# Patient Record
Sex: MALE | Race: WHITE | NOT HISPANIC OR LATINO | Employment: OTHER | URBAN - NONMETROPOLITAN AREA
[De-identification: names, ages, dates, MRNs, and addresses within clinical notes are randomized per-mention and may not be internally consistent; named-entity substitution may affect disease eponyms.]

---

## 2017-01-03 ENCOUNTER — HOSPITAL ENCOUNTER (OUTPATIENT)
Dept: RADIATION ONCOLOGY | Facility: HOSPITAL | Age: 67
Discharge: HOME | End: 2017-01-03

## 2017-01-03 ENCOUNTER — HOSPITAL ENCOUNTER (OUTPATIENT)
Dept: RADIATION ONCOLOGY | Facility: HOSPITAL | Age: 67
Setting detail: RADIATION/ONCOLOGY SERIES
End: 2017-01-03

## 2017-01-03 PROCEDURE — 77385: CPT | Performed by: RADIOLOGY

## 2017-02-09 ENCOUNTER — DOCUMENTATION (OUTPATIENT)
Dept: RADIATION ONCOLOGY | Facility: HOSPITAL | Age: 67
End: 2017-02-09

## 2017-02-10 ENCOUNTER — OFFICE VISIT (OUTPATIENT)
Dept: RADIATION ONCOLOGY | Facility: HOSPITAL | Age: 67
End: 2017-02-10

## 2017-02-10 VITALS
BODY MASS INDEX: 30.46 KG/M2 | SYSTOLIC BLOOD PRESSURE: 142 MMHG | WEIGHT: 201 LBS | HEIGHT: 68 IN | DIASTOLIC BLOOD PRESSURE: 78 MMHG

## 2017-02-10 DIAGNOSIS — Z92.3 S/P RADIATION THERAPY 4-12 WKS AGO: ICD-10-CM

## 2017-02-10 DIAGNOSIS — C61 PROSTATE CANCER (HCC): Primary | ICD-10-CM

## 2017-03-01 ENCOUNTER — OFFICE VISIT (OUTPATIENT)
Dept: UROLOGY | Facility: CLINIC | Age: 67
End: 2017-03-01

## 2017-03-01 VITALS
BODY MASS INDEX: 30.65 KG/M2 | WEIGHT: 202.2 LBS | HEIGHT: 68 IN | TEMPERATURE: 98.5 F | DIASTOLIC BLOOD PRESSURE: 68 MMHG | SYSTOLIC BLOOD PRESSURE: 130 MMHG

## 2017-03-01 DIAGNOSIS — C61 PROSTATE CANCER (HCC): Primary | ICD-10-CM

## 2017-03-01 PROCEDURE — 99212 OFFICE O/P EST SF 10 MIN: CPT | Performed by: UROLOGY

## 2017-03-01 NOTE — PROGRESS NOTES
Mr. Palacios is 67 y.o. male    Chief Complaint   Patient presents with   • Prostate Cancer       History of Present Illness   He was initially diagnosed with prostate cancer about  several month(s) ago. This was identified in the context of elevated psa and prostate nodule.  Severity of the disease is best described as probable organ confined disease. Previous or current management includes external beam radiotherapy. Associated symptoms include no evidence of irritative or obstructive voiding symptoms, gross hematuria, lower extremity swelling or weight loss. . Currently his PSA is  1.79.     The following portions of the patient's history were reviewed and updated as appropriate: allergies, current medications, past family history, past medical history, past social history, past surgical history and problem list.    Review of Systems   Constitutional: Negative for chills and fever.   Gastrointestinal: Negative for abdominal pain, anal bleeding and blood in stool.   Genitourinary: Negative for difficulty urinating, flank pain, frequency, hematuria, penile pain and urgency.         Current Outpatient Prescriptions:   •  ALPRAZolam (XANAX) 0.5 MG tablet, Take 0.5 mg by mouth 2 (Two) Times a Day As Needed for anxiety., Disp: , Rfl:   •  triamterene-hydrochlorothiazide (DYAZIDE) 37.5-25 MG per capsule, Take 1 capsule by mouth Every Morning., Disp: , Rfl:     Past Medical History   Diagnosis Date   • History of radiation therapy 01/03/2017     7560 cGy to prostate   • Hypertension    • Prostate cancer        Past Surgical History   Procedure Laterality Date   • Prostate biopsy         Social History     Social History   • Marital status: Single     Spouse name: N/A   • Number of children: N/A   • Years of education: N/A     Social History Main Topics   • Smoking status: Never Smoker   • Smokeless tobacco: None   • Alcohol use Yes      Comment: rare    • Drug use: None   • Sexual activity: Not Asked     Other Topics  "Concern   • None     Social History Narrative       Family History   Problem Relation Age of Onset   • COPD Father    • Colon cancer Father        Objective    Visit Vitals   • /68   • Temp 98.5 °F (36.9 °C)   • Ht 68\" (172.7 cm)   • Wt 202 lb 3.2 oz (91.7 kg)   • BMI 30.74 kg/m2       Physical Exam    Abstract on 10/17/2016   Component Date Value Ref Range Status   •  PSA 04/04/2016 4.3   Final       Results for orders placed or performed in visit on 10/17/16    PSA   Result Value Ref Range     PSA 4.3      Assessment and Plan    Frederic was seen today for prostate cancer.    Diagnoses and all orders for this visit:    Prostate cancer  -     PSA; Future    Status post radiation for Steve 3+3 prostate cancer.  His PSA is 1.79.  He has no symptoms from the radiation at this point.  He is voiding well.  No problems with erections.  I would like see him back in 6 months with a repeat PSA.  I will then see him on a yearly basis and I will alternate with his radiation oncologist.    EMR Dragon/Transcription disclaimer:  Much of this encounter note is an electronic transcription/translation of spoken language to printed text. The electronic translation of spoken language may permit erroneous, or at times, nonsensical words or phrases to be inadvertently transcribed; although I have reviewed the note for such errors, some may still exist.   "

## 2017-03-06 ENCOUNTER — RESULTS ENCOUNTER (OUTPATIENT)
Dept: UROLOGY | Facility: CLINIC | Age: 67
End: 2017-03-06

## 2017-03-06 DIAGNOSIS — C61 PROSTATE CANCER (HCC): ICD-10-CM

## 2017-08-02 LAB — PSA SERPL-MCNC: 1.03 NG/ML (ref 0–4)

## 2017-08-08 ENCOUNTER — OFFICE VISIT (OUTPATIENT)
Dept: UROLOGY | Facility: CLINIC | Age: 67
End: 2017-08-08

## 2017-08-08 VITALS
SYSTOLIC BLOOD PRESSURE: 168 MMHG | WEIGHT: 185.8 LBS | HEIGHT: 68 IN | TEMPERATURE: 99.1 F | DIASTOLIC BLOOD PRESSURE: 94 MMHG | BODY MASS INDEX: 28.16 KG/M2

## 2017-08-08 DIAGNOSIS — N52.9 ERECTILE DYSFUNCTION, UNSPECIFIED ERECTILE DYSFUNCTION TYPE: ICD-10-CM

## 2017-08-08 DIAGNOSIS — C61 PROSTATE CANCER (HCC): Primary | ICD-10-CM

## 2017-08-08 PROCEDURE — 99214 OFFICE O/P EST MOD 30 MIN: CPT | Performed by: UROLOGY

## 2017-08-08 RX ORDER — TADALAFIL 20 MG/1
20 TABLET ORAL DAILY PRN
Qty: 10 TABLET | Refills: 11 | Status: SHIPPED | OUTPATIENT
Start: 2017-08-08 | End: 2017-09-07

## 2017-08-08 NOTE — PROGRESS NOTES
Mr. Palacios is 67 y.o. male    Chief Complaint   Patient presents with   • Prostate Cancer   • Erectile Dysfunction       Erectile Dysfunction   This is a new problem. The current episode started more than 1 month ago. The problem has been gradually worsening since onset. The nature of his difficulty is achieving erection and maintaining erection. Irritative symptoms do not include frequency or urgency. Pertinent negatives include no chills or hematuria. Nothing aggravates the symptoms. Past treatments include nothing. The treatment provided no relief. Risk factors include hypertension (prostate radiation).      He was initially diagnosed with prostate cancer about  1 year(s) ago. This was identified in the context of elevated psa.  Severity of the disease is best described as probable organ confined disease. Previous or current management includes external beam radiotherapy. Associated symptoms include erectile dysfunction. Currently his PSA is  1.03.     The following portions of the patient's history were reviewed and updated as appropriate: allergies, current medications, past family history, past medical history, past social history, past surgical history and problem list.    Review of Systems   Constitutional: Negative for chills and fever.   Gastrointestinal: Negative for abdominal pain, anal bleeding and blood in stool.   Genitourinary: Negative for difficulty urinating, flank pain, frequency, hematuria and urgency.         Current Outpatient Prescriptions:   •  ALPRAZolam (XANAX) 0.5 MG tablet, Take 0.5 mg by mouth 2 (Two) Times a Day As Needed for anxiety., Disp: , Rfl:   •  tadalafil (CIALIS) 20 MG tablet, Take 1 tablet by mouth Daily As Needed for erectile dysfunction for up to 30 days., Disp: 10 tablet, Rfl: 11  •  triamterene-hydrochlorothiazide (DYAZIDE) 37.5-25 MG per capsule, Take 1 capsule by mouth Every Morning., Disp: , Rfl:     Past Medical History:   Diagnosis Date   • History of radiation  "therapy 01/03/2017    7560 cGy to prostate   • Hypertension    • Prostate cancer        Past Surgical History:   Procedure Laterality Date   • PROSTATE BIOPSY         Social History     Social History   • Marital status: Single     Spouse name: N/A   • Number of children: N/A   • Years of education: N/A     Social History Main Topics   • Smoking status: Never Smoker   • Smokeless tobacco: None   • Alcohol use No   • Drug use: None   • Sexual activity: Not Asked     Other Topics Concern   • None     Social History Narrative       Family History   Problem Relation Age of Onset   • COPD Father    • Colon cancer Father        Objective    /94  Temp 99.1 °F (37.3 °C)  Ht 68\" (172.7 cm)  Wt 185 lb 12.8 oz (84.3 kg)  BMI 28.25 kg/m2    Physical Exam    Results Encounter on 03/06/2017   Component Date Value Ref Range Status   • PSA 08/02/2017 1.030  0.000 - 4.000 ng/mL Final       Results for orders placed or performed in visit on 03/06/17   PSA   Result Value Ref Range    PSA 1.030 0.000 - 4.000 ng/mL     Assessment and Plan    Frederic was seen today for prostate cancer and erectile dysfunction.    Diagnoses and all orders for this visit:    Prostate cancer  -     PSA; Future  Stay remains undetectable.  I will see him back in 6 months with a repeat PSA.    Erectile dysfunction, unspecified erectile dysfunction type  -     tadalafil (CIALIS) 20 MG tablet; Take 1 tablet by mouth Daily As Needed for erectile dysfunction for up to 30 days.  He is starting to develop some significant erectile dysfunction.  This is likely a combination of the radiation as well as history of high blood pressure.  He is interested in medications and I have written him a prescription for Cialis as well as given him a 20 mg sample packet.  He understands risk and benefits.    The patient and I discussed the likely etiology of his impotence.  We discussed diagnostic evaluation for erectile dysfunction is possible but usually does not change " "the management.  He understands that goal directed therapy is probably the best approach since no matter what the etiology, a PDE 5 inhibitor is the least invasive way to manage ED.  While there are risks to PDE 5 inhibitors which I discussed as described below, it was relayed to him that they probably provide the most \"natural\" erection.  Other options discussed included penile injections, urethral suppositories, vacuum erection device without or without the tension ring, and penile prosthesis.  The risks of headache, visual changes, hypotension, priapism, GI distress, myalgias, and the contraindication of nitrates were discussed with the patient.  The patient denies a history of nitrates either in long acting or prn use form.  He denies a history of MI, heart failure, or stroke in the last 6 months.  I discussed with him that alpha blockers should be taken at least 4 hours apart from the PDE 5 inhibitors.  He was given a sample and prescription for  Cialis  20 mg for as needed use. I reminded him that facial flushing is not usual with this medication, and that it is most effective when taken on an empty stomach without alcohol.  He understands that he needs to be forthright about using this medication in any medical situation, especially when giving nitrates is being considered.    "

## 2017-08-13 ENCOUNTER — RESULTS ENCOUNTER (OUTPATIENT)
Dept: UROLOGY | Facility: CLINIC | Age: 67
End: 2017-08-13

## 2017-08-13 DIAGNOSIS — C61 PROSTATE CANCER (HCC): ICD-10-CM

## 2018-01-29 ENCOUNTER — OFFICE VISIT (OUTPATIENT)
Dept: GASTROENTEROLOGY | Facility: CLINIC | Age: 68
End: 2018-01-29

## 2018-01-29 VITALS
BODY MASS INDEX: 27.58 KG/M2 | OXYGEN SATURATION: 99 % | WEIGHT: 182 LBS | DIASTOLIC BLOOD PRESSURE: 80 MMHG | TEMPERATURE: 97.3 F | SYSTOLIC BLOOD PRESSURE: 144 MMHG | HEIGHT: 68 IN | HEART RATE: 90 BPM

## 2018-01-29 DIAGNOSIS — Z80.0 FAMILY HISTORY OF COLON CANCER: Primary | ICD-10-CM

## 2018-01-29 DIAGNOSIS — Z86.010 HISTORY OF COLON POLYPS: ICD-10-CM

## 2018-01-29 PROCEDURE — S0260 H&P FOR SURGERY: HCPCS | Performed by: NURSE PRACTITIONER

## 2018-01-29 RX ORDER — ALBUTEROL SULFATE 2.5 MG/3ML
2.5 SOLUTION RESPIRATORY (INHALATION) EVERY 4 HOURS PRN
COMMUNITY
End: 2018-04-17

## 2018-01-29 RX ORDER — LANSOPRAZOLE 15 MG/1
15 CAPSULE, DELAYED RELEASE ORAL DAILY
COMMUNITY

## 2018-01-29 NOTE — PROGRESS NOTES
Chief Complaint   Patient presents with   • Colonoscopy     7-8-10 had colon normal     Subjective   HPI    Frederic Palacios is a 67 y.o. male who presents to office for preventative maintenance.  There is  a personal history of colon polyps.  There is not a history of colon cancer.  He does not have complaints of nausea/vomiting, change in bowels, weight loss, no BRBPR, no melena.  There is a family history of colon cancer-father dx age of 58 and maternal GM.  There is not a family history of colon polyps.  Pt last colonoscopy-2010 .  Bowels do move on regular basis.  Diagnosed with prostate cancer 3 yr ago.  Completed radiation treatment Jan 2017.  Pt worked at [employer] x 30 yr.    CScope (Dr Alonso) 2010 normal  CScope (Dr Alonso) 2004 hyperplastic polyp removed from 25 cm      Past Medical History:   Diagnosis Date   • History of radiation therapy 01/03/2017    7560 cGy to prostate   • Hypertension    • Prostate cancer      Past Surgical History:   Procedure Laterality Date   • COLONOSCOPY  07/08/2010    normal   • COLONOSCOPY  01/29/2004    small polyp at 25 cm,otherwise normal colonoscopy   • ENDOSCOPY  01/29/2004    gastritis   • PROSTATE BIOPSY       Outpatient Prescriptions Marked as Taking for the 1/29/18 encounter (Office Visit) with ADRY Strauss   Medication Sig Dispense Refill   • albuterol (PROVENTIL) (2.5 MG/3ML) 0.083% nebulizer solution Take 2.5 mg by nebulization Every 4 (Four) Hours As Needed for Wheezing.     • ALPRAZolam (XANAX) 0.5 MG tablet Take 0.5 mg by mouth 2 (Two) Times a Day As Needed for anxiety.     • lansoprazole (PREVACID) 15 MG capsule Take 15 mg by mouth Daily.     • triamterene-hydrochlorothiazide (DYAZIDE) 37.5-25 MG per capsule Take 1 capsule by mouth Every Morning.       No Known Allergies  Social History     Social History   • Marital status: Single     Spouse name: N/A   • Number of children: N/A   • Years of education: N/A     Occupational History   • Not on file.      Social History Main Topics   • Smoking status: Never Smoker   • Smokeless tobacco: Never Used   • Alcohol use No   • Drug use: No   • Sexual activity: Not on file     Other Topics Concern   • Not on file     Social History Narrative     Family History   Problem Relation Age of Onset   • COPD Father    • Colon cancer Father      Review of Systems   Constitutional: Negative for fatigue, fever and unexpected weight change.   HENT: Negative for hearing loss, sore throat and voice change.    Eyes: Negative for visual disturbance.   Respiratory: Negative for cough, shortness of breath and wheezing.    Cardiovascular: Negative for chest pain and palpitations.   Gastrointestinal: Negative for abdominal pain, blood in stool and vomiting.   Endocrine: Negative for polydipsia and polyuria.   Genitourinary: Negative for difficulty urinating, dysuria, hematuria and urgency.   Musculoskeletal: Negative for joint swelling and myalgias.   Skin: Negative for color change, rash and wound.   Neurological: Negative for dizziness, tremors, seizures and syncope.   Hematological: Does not bruise/bleed easily.   Psychiatric/Behavioral: Negative for agitation and confusion. The patient is not nervous/anxious.      Objective   Vitals:    01/29/18 0905   BP: 144/80   Pulse: 90   Temp: 97.3 °F (36.3 °C)   SpO2: 99%     Physical Exam   Constitutional: He is oriented to person, place, and time. He appears well-developed and well-nourished.   HENT:   Head: Normocephalic and atraumatic.   Eyes:   Pink, Nonicteric   Neck:   Global Assessment- supple. No JVD or lymphadenopathy   Cardiovascular: Normal rate, regular rhythm and normal heart sounds.  Exam reveals no gallop and no friction rub.    No murmur heard.  Pulmonary/Chest: Effort normal and breath sounds normal. No respiratory distress. He has no wheezes. He has no rales.   Inspection: Movements-Symmetrical   Abdominal: Soft. Bowel sounds are normal. He exhibits no distension and no mass.  There is no tenderness. There is no rebound and no guarding.   Neurological: He is alert and oriented to person, place, and time.   General Exam-Deemed a reliable historian, able to converse without difficulty and Able to move all extremities without difficulty     Imaging Results (most recent)     None        Assessment/Plan   Frederic was seen today for colonoscopy.    Diagnoses and all orders for this visit:    Family history of colon cancer  Comments:  father dx age of 58  Orders:  -     polyethylene glycol (GoLYTELY) 236 g solution; Take 3,785 mL by mouth 1 (One) Time for 1 dose. Take as directed  -     Case Request; Standing  -     Implement Anesthesia Orders Day of Procedure; Standing  -     Obtain Informed Consent; Standing    History of colon polyps  Comments:  hyperplastic 25 cm 2004      COLONOSCOPY WITH ANESTHESIA (N/A)  Declined Golytely, educated on Miralax prep    Advised pt to stop ASA day prior to procedure and to stop use of NSAIDs, Fish Oil, and MV 5 days prior to procedure.  Tylenol based products are ok to take.  Pt verbalized understanding.    All risks, benefits, alternatives, and indications of colonoscopy procedure have been discussed with the patient. Risks to include perforation of the colon requiring possible surgery or colostomy, risk of bleeding from biopsies or removal of colon tissue, possibility of missing a colon polyp or cancer, or adverse drug reaction.  Benefits to include the diagnosis and management of disease of the colon and rectum. Alternatives to include barium enema, radiographic evaluation, lab testing or no intervention. Pt verbalizes understanding and agrees.     There are no Patient Instructions on file for this visit.

## 2018-02-01 ENCOUNTER — HOSPITAL ENCOUNTER (OUTPATIENT)
Dept: RADIATION ONCOLOGY | Facility: HOSPITAL | Age: 68
Setting detail: RADIATION/ONCOLOGY SERIES
End: 2018-02-01

## 2018-02-05 LAB — PSA SERPL-MCNC: 0.99 NG/ML (ref 0–4)

## 2018-02-12 ENCOUNTER — OFFICE VISIT (OUTPATIENT)
Dept: UROLOGY | Facility: CLINIC | Age: 68
End: 2018-02-12

## 2018-02-12 VITALS
DIASTOLIC BLOOD PRESSURE: 56 MMHG | WEIGHT: 177 LBS | SYSTOLIC BLOOD PRESSURE: 140 MMHG | HEIGHT: 68 IN | TEMPERATURE: 98.1 F | BODY MASS INDEX: 26.83 KG/M2

## 2018-02-12 DIAGNOSIS — N52.9 ERECTILE DYSFUNCTION, UNSPECIFIED ERECTILE DYSFUNCTION TYPE: Primary | ICD-10-CM

## 2018-02-12 DIAGNOSIS — C61 PROSTATE CANCER (HCC): ICD-10-CM

## 2018-02-12 PROCEDURE — 99213 OFFICE O/P EST LOW 20 MIN: CPT | Performed by: UROLOGY

## 2018-02-12 RX ORDER — TADALAFIL 20 MG/1
20 TABLET ORAL DAILY PRN
Qty: 10 TABLET | Refills: 11 | Status: SHIPPED | OUTPATIENT
Start: 2018-02-12 | End: 2018-03-14

## 2018-02-12 NOTE — PROGRESS NOTES
Mr. Palacios is 68 y.o. male    Chief Complaint   Patient presents with   • Prostate Cancer   • Erectile Dysfunction       Erectile Dysfunction   This is a new problem. The current episode started more than 1 month ago. The problem has been gradually worsening since onset. The nature of his difficulty is achieving erection and maintaining erection. Irritative symptoms do not include frequency or urgency. Pertinent negatives include no chills or hematuria. Nothing aggravates the symptoms. Past treatments include tadalafil. The treatment provided significant relief. He has been using treatment for 1 to 6 months. Risk factors include hypertension (prostate radiation).      He was initially diagnosed with prostate cancer about  several month(s) ago. This was identified in the context of elevated psa.  Severity of the disease is best described as probable organ confined disease. Previous or current management includes external beam radiotherapy. Associated symptoms include erectile dysfunction. Currently his PSA is  0.99.       The following portions of the patient's history were reviewed and updated as appropriate: allergies, current medications, past family history, past medical history, past social history, past surgical history and problem list.    Review of Systems   Constitutional: Negative for chills and fever.   Gastrointestinal: Negative for abdominal pain, anal bleeding and blood in stool.   Genitourinary: Negative for difficulty urinating, flank pain, frequency, hematuria and urgency.         Current Outpatient Prescriptions:   •  albuterol (PROVENTIL) (2.5 MG/3ML) 0.083% nebulizer solution, Take 2.5 mg by nebulization Every 4 (Four) Hours As Needed for Wheezing., Disp: , Rfl:   •  ALPRAZolam (XANAX) 0.5 MG tablet, Take 0.5 mg by mouth 2 (Two) Times a Day As Needed for anxiety., Disp: , Rfl:   •  lansoprazole (PREVACID) 15 MG capsule, Take 15 mg by mouth Daily., Disp: , Rfl:   •  tadalafil (CIALIS) 20 MG  "tablet, Take 1 tablet by mouth Daily As Needed for erectile dysfunction for up to 30 days., Disp: 10 tablet, Rfl: 11  •  triamterene-hydrochlorothiazide (DYAZIDE) 37.5-25 MG per capsule, Take 1 capsule by mouth Every Morning., Disp: , Rfl:     Past Medical History:   Diagnosis Date   • History of radiation therapy 01/03/2017    7560 cGy to prostate   • Hypertension    • Prostate cancer        Past Surgical History:   Procedure Laterality Date   • COLONOSCOPY  07/08/2010    normal   • COLONOSCOPY  01/29/2004    small polyp at 25 cm,otherwise normal colonoscopy   • ENDOSCOPY  01/29/2004    gastritis   • PROSTATE BIOPSY         Social History     Social History   • Marital status: Single     Spouse name: N/A   • Number of children: N/A   • Years of education: N/A     Social History Main Topics   • Smoking status: Never Smoker   • Smokeless tobacco: Never Used   • Alcohol use No   • Drug use: No   • Sexual activity: Not Asked     Other Topics Concern   • None     Social History Narrative       Family History   Problem Relation Age of Onset   • COPD Father    • Colon cancer Father        Objective    /56  Temp 98.1 °F (36.7 °C)  Ht 172.7 cm (68\")  Wt 80.3 kg (177 lb)  BMI 26.91 kg/m2    Physical Exam    Results Encounter on 08/13/2017   Component Date Value Ref Range Status   • PSA 02/05/2018 0.995  0.000 - 4.000 ng/mL Final       Results for orders placed or performed in visit on 08/13/17   PSA   Result Value Ref Range    PSA 0.995 0.000 - 4.000 ng/mL     Assessment and Plan    Frederic was seen today for prostate cancer and erectile dysfunction.    Diagnoses and all orders for this visit:    Erectile dysfunction, unspecified erectile dysfunction type  -     tadalafil (CIALIS) 20 MG tablet; Take 1 tablet by mouth Daily As Needed for erectile dysfunction for up to 30 days.    Prostate cancer  -     PSA DIAGNOSTIC  PSA is 0.99.  I will consider this is PSA anne, continue to monitor from this point.  I will see " him back in 6 months with a repeat PSA.  With regards to his erectile dysfunction, he states that he had a good result with the Cialis.  He states that he is breaking these pills into 1/4, and this is working well.  Denies any side effects from this medication is asked for refill on this prescription.  I have done this for him today.

## 2018-02-15 ENCOUNTER — OFFICE VISIT (OUTPATIENT)
Dept: RADIATION ONCOLOGY | Facility: HOSPITAL | Age: 68
End: 2018-02-15

## 2018-02-15 ENCOUNTER — HOSPITAL ENCOUNTER (OUTPATIENT)
Dept: RADIATION ONCOLOGY | Facility: HOSPITAL | Age: 68
Setting detail: RADIATION/ONCOLOGY SERIES
End: 2018-02-15

## 2018-02-15 VITALS
WEIGHT: 177 LBS | BODY MASS INDEX: 26.83 KG/M2 | HEIGHT: 68 IN | SYSTOLIC BLOOD PRESSURE: 138 MMHG | DIASTOLIC BLOOD PRESSURE: 62 MMHG

## 2018-02-15 DIAGNOSIS — Z78.9 NON-SMOKER: ICD-10-CM

## 2018-02-15 DIAGNOSIS — Z85.46 ENCOUNTER FOR FOLLOW-UP SURVEILLANCE OF PROSTATE CANCER: ICD-10-CM

## 2018-02-15 DIAGNOSIS — Z92.3 HISTORY OF RADIATION THERAPY: ICD-10-CM

## 2018-02-15 DIAGNOSIS — C61 CANCER OF PROSTATE WITH LOW RECURRENCE RISK (STAGE T1-2A AND GLEASON < 7 AND PSA < 10) (HCC): Primary | ICD-10-CM

## 2018-02-15 DIAGNOSIS — Z08 ENCOUNTER FOR FOLLOW-UP SURVEILLANCE OF PROSTATE CANCER: ICD-10-CM

## 2018-02-15 PROCEDURE — G0463 HOSPITAL OUTPT CLINIC VISIT: HCPCS | Performed by: RADIOLOGY

## 2018-02-16 ENCOUNTER — ANESTHESIA EVENT (OUTPATIENT)
Dept: GASTROENTEROLOGY | Facility: HOSPITAL | Age: 68
End: 2018-02-16

## 2018-02-16 ENCOUNTER — TELEPHONE (OUTPATIENT)
Dept: GASTROENTEROLOGY | Facility: CLINIC | Age: 68
End: 2018-02-16

## 2018-02-16 ENCOUNTER — HOSPITAL ENCOUNTER (OUTPATIENT)
Facility: HOSPITAL | Age: 68
Setting detail: HOSPITAL OUTPATIENT SURGERY
Discharge: HOME | End: 2018-02-16
Attending: INTERNAL MEDICINE | Admitting: INTERNAL MEDICINE

## 2018-02-16 ENCOUNTER — ANESTHESIA (OUTPATIENT)
Dept: GASTROENTEROLOGY | Facility: HOSPITAL | Age: 68
End: 2018-02-16

## 2018-02-16 VITALS
BODY MASS INDEX: 26.52 KG/M2 | TEMPERATURE: 98.1 F | RESPIRATION RATE: 15 BRPM | HEIGHT: 68 IN | WEIGHT: 175 LBS | DIASTOLIC BLOOD PRESSURE: 60 MMHG | SYSTOLIC BLOOD PRESSURE: 117 MMHG | HEART RATE: 64 BPM | OXYGEN SATURATION: 98 %

## 2018-02-16 DIAGNOSIS — Z80.0 FAMILY HISTORY OF COLON CANCER: ICD-10-CM

## 2018-02-16 PROCEDURE — G0105 COLORECTAL SCRN; HI RISK IND: HCPCS | Performed by: INTERNAL MEDICINE

## 2018-02-16 PROCEDURE — 25010000002 PROPOFOL 10 MG/ML EMULSION: Performed by: NURSE ANESTHETIST, CERTIFIED REGISTERED

## 2018-02-16 RX ORDER — PROPOFOL 10 MG/ML
VIAL (ML) INTRAVENOUS AS NEEDED
Status: DISCONTINUED | OUTPATIENT
Start: 2018-02-16 | End: 2018-02-16 | Stop reason: SURG

## 2018-02-16 RX ORDER — SODIUM CHLORIDE 9 MG/ML
INJECTION, SOLUTION INTRAVENOUS CONTINUOUS PRN
Status: DISCONTINUED | OUTPATIENT
Start: 2018-02-16 | End: 2018-02-16 | Stop reason: SURG

## 2018-02-16 RX ORDER — SODIUM CHLORIDE 0.9 % (FLUSH) 0.9 %
3 SYRINGE (ML) INJECTION AS NEEDED
Status: DISCONTINUED | OUTPATIENT
Start: 2018-02-16 | End: 2018-02-16 | Stop reason: HOSPADM

## 2018-02-16 RX ORDER — SODIUM CHLORIDE 9 MG/ML
500 INJECTION, SOLUTION INTRAVENOUS CONTINUOUS PRN
Status: DISCONTINUED | OUTPATIENT
Start: 2018-02-16 | End: 2018-02-16 | Stop reason: HOSPADM

## 2018-02-16 RX ADMIN — PROPOFOL 50 MG: 10 INJECTION, EMULSION INTRAVENOUS at 07:35

## 2018-02-16 RX ADMIN — SODIUM CHLORIDE 500 ML: 9 INJECTION, SOLUTION INTRAVENOUS at 07:10

## 2018-02-16 RX ADMIN — SODIUM CHLORIDE: 9 INJECTION, SOLUTION INTRAVENOUS at 07:33

## 2018-02-16 RX ADMIN — PROPOFOL 50 MG: 10 INJECTION, EMULSION INTRAVENOUS at 07:37

## 2018-02-16 RX ADMIN — PROPOFOL 50 MG: 10 INJECTION, EMULSION INTRAVENOUS at 07:32

## 2018-02-16 RX ADMIN — LIDOCAINE HYDROCHLORIDE 0.5 ML: 10 INJECTION, SOLUTION EPIDURAL; INFILTRATION; INTRACAUDAL; PERINEURAL at 07:10

## 2018-02-16 RX ADMIN — PROPOFOL 50 MG: 10 INJECTION, EMULSION INTRAVENOUS at 07:33

## 2018-02-16 NOTE — ANESTHESIA PREPROCEDURE EVALUATION
Anesthesia Evaluation     Patient summary reviewed   no history of anesthetic complications:  NPO Solid Status: > 8 hours             Airway   Mallampati: II  TM distance: >3 FB  Neck ROM: full  Dental      Pulmonary    (+) COPD,   Cardiovascular     (+) hypertension,       Neuro/Psych- negative ROS  GI/Hepatic/Renal/Endo    (+)  GERD,     Musculoskeletal     Abdominal    Substance History      OB/GYN          Other                        Anesthesia Plan    ASA 2     general     intravenous induction   Anesthetic plan and risks discussed with patient.

## 2018-02-16 NOTE — PLAN OF CARE
Problem: GI Endoscopy (Adult)  Goal: Signs and Symptoms of Listed Potential Problems Will be Absent or Manageable (GI Endoscopy)  Outcome: Outcome(s) achieved Date Met: 02/16/18

## 2018-02-16 NOTE — H&P (VIEW-ONLY)
Chief Complaint   Patient presents with   • Colonoscopy     7-8-10 had colon normal     Subjective   HPI    Frederic Palacios is a 67 y.o. male who presents to office for preventative maintenance.  There is  a personal history of colon polyps.  There is not a history of colon cancer.  He does not have complaints of nausea/vomiting, change in bowels, weight loss, no BRBPR, no melena.  There is a family history of colon cancer-father dx age of 58 and maternal GM.  There is not a family history of colon polyps.  Pt last colonoscopy-2010 .  Bowels do move on regular basis.  Diagnosed with prostate cancer 3 yr ago.  Completed radiation treatment Jan 2017.  Pt worked at [employer] x 30 yr.    CScope (Dr Alonso) 2010 normal  CScope (Dr Alonso) 2004 hyperplastic polyp removed from 25 cm      Past Medical History:   Diagnosis Date   • History of radiation therapy 01/03/2017    7560 cGy to prostate   • Hypertension    • Prostate cancer      Past Surgical History:   Procedure Laterality Date   • COLONOSCOPY  07/08/2010    normal   • COLONOSCOPY  01/29/2004    small polyp at 25 cm,otherwise normal colonoscopy   • ENDOSCOPY  01/29/2004    gastritis   • PROSTATE BIOPSY       Outpatient Prescriptions Marked as Taking for the 1/29/18 encounter (Office Visit) with ADRY Strauss   Medication Sig Dispense Refill   • albuterol (PROVENTIL) (2.5 MG/3ML) 0.083% nebulizer solution Take 2.5 mg by nebulization Every 4 (Four) Hours As Needed for Wheezing.     • ALPRAZolam (XANAX) 0.5 MG tablet Take 0.5 mg by mouth 2 (Two) Times a Day As Needed for anxiety.     • lansoprazole (PREVACID) 15 MG capsule Take 15 mg by mouth Daily.     • triamterene-hydrochlorothiazide (DYAZIDE) 37.5-25 MG per capsule Take 1 capsule by mouth Every Morning.       No Known Allergies  Social History     Social History   • Marital status: Single     Spouse name: N/A   • Number of children: N/A   • Years of education: N/A     Occupational History   • Not on file.      Social History Main Topics   • Smoking status: Never Smoker   • Smokeless tobacco: Never Used   • Alcohol use No   • Drug use: No   • Sexual activity: Not on file     Other Topics Concern   • Not on file     Social History Narrative     Family History   Problem Relation Age of Onset   • COPD Father    • Colon cancer Father      Review of Systems   Constitutional: Negative for fatigue, fever and unexpected weight change.   HENT: Negative for hearing loss, sore throat and voice change.    Eyes: Negative for visual disturbance.   Respiratory: Negative for cough, shortness of breath and wheezing.    Cardiovascular: Negative for chest pain and palpitations.   Gastrointestinal: Negative for abdominal pain, blood in stool and vomiting.   Endocrine: Negative for polydipsia and polyuria.   Genitourinary: Negative for difficulty urinating, dysuria, hematuria and urgency.   Musculoskeletal: Negative for joint swelling and myalgias.   Skin: Negative for color change, rash and wound.   Neurological: Negative for dizziness, tremors, seizures and syncope.   Hematological: Does not bruise/bleed easily.   Psychiatric/Behavioral: Negative for agitation and confusion. The patient is not nervous/anxious.      Objective   Vitals:    01/29/18 0905   BP: 144/80   Pulse: 90   Temp: 97.3 °F (36.3 °C)   SpO2: 99%     Physical Exam   Constitutional: He is oriented to person, place, and time. He appears well-developed and well-nourished.   HENT:   Head: Normocephalic and atraumatic.   Eyes:   Pink, Nonicteric   Neck:   Global Assessment- supple. No JVD or lymphadenopathy   Cardiovascular: Normal rate, regular rhythm and normal heart sounds.  Exam reveals no gallop and no friction rub.    No murmur heard.  Pulmonary/Chest: Effort normal and breath sounds normal. No respiratory distress. He has no wheezes. He has no rales.   Inspection: Movements-Symmetrical   Abdominal: Soft. Bowel sounds are normal. He exhibits no distension and no mass.  There is no tenderness. There is no rebound and no guarding.   Neurological: He is alert and oriented to person, place, and time.   General Exam-Deemed a reliable historian, able to converse without difficulty and Able to move all extremities without difficulty     Imaging Results (most recent)     None        Assessment/Plan   Frederic was seen today for colonoscopy.    Diagnoses and all orders for this visit:    Family history of colon cancer  Comments:  father dx age of 58  Orders:  -     polyethylene glycol (GoLYTELY) 236 g solution; Take 3,785 mL by mouth 1 (One) Time for 1 dose. Take as directed  -     Case Request; Standing  -     Implement Anesthesia Orders Day of Procedure; Standing  -     Obtain Informed Consent; Standing    History of colon polyps  Comments:  hyperplastic 25 cm 2004      COLONOSCOPY WITH ANESTHESIA (N/A)  Declined Golytely, educated on Miralax prep    Advised pt to stop ASA day prior to procedure and to stop use of NSAIDs, Fish Oil, and MV 5 days prior to procedure.  Tylenol based products are ok to take.  Pt verbalized understanding.    All risks, benefits, alternatives, and indications of colonoscopy procedure have been discussed with the patient. Risks to include perforation of the colon requiring possible surgery or colostomy, risk of bleeding from biopsies or removal of colon tissue, possibility of missing a colon polyp or cancer, or adverse drug reaction.  Benefits to include the diagnosis and management of disease of the colon and rectum. Alternatives to include barium enema, radiographic evaluation, lab testing or no intervention. Pt verbalizes understanding and agrees.     There are no Patient Instructions on file for this visit.

## 2018-02-16 NOTE — PLAN OF CARE
Problem: Patient Care Overview (Adult)  Goal: Plan of Care Review  Outcome: Ongoing (interventions implemented as appropriate)   02/16/18 0736   Patient Care Overview   Progress improving   Outcome Evaluation   Outcome Summary/Follow up Plan pt tolerating procedure well        Problem: GI Endoscopy (Adult)  Goal: Signs and Symptoms of Listed Potential Problems Will be Absent or Manageable (GI Endoscopy)  Outcome: Ongoing (interventions implemented as appropriate)   02/16/18 0736   GI Endoscopy   Problems Assessed (GI Endoscopy) all   Problems Present (GI Endoscopy) none

## 2018-02-16 NOTE — PLAN OF CARE
Problem: Patient Care Overview (Adult)  Goal: Adult Individualization and Mutuality   02/16/18 0653   Individualization   Patient Specific Preferences none   Patient Specific Goals none   Patient Specific Interventions none   Mutuality/Individual Preferences   What Anxieties, Fears or Concerns Do You Have About Your Health or Care? none   What Questions Do You Have About Your Health or Care? none   What Information Would Help Us Give You More Personalized Care? none

## 2018-02-16 NOTE — ANESTHESIA POSTPROCEDURE EVALUATION
Patient: Frederic Palacios    Procedure Summary     Date Anesthesia Start Anesthesia Stop Room / Location    02/16/18 0732 0748  [facility] /  [facility]       Procedure Diagnosis Surgeon Provider    COLONOSCOPY WITH ANESTHESIA (N/A ) Family history of colon cancer  (Family history of colon cancer [Z80.0]) DO Nahum Atkins CRNA          Anesthesia Type: general  Last vitals  BP   117/60 (02/16/18 0800)   Temp   98.1 °F (36.7 °C) (02/16/18 0651)   Pulse   64 (02/16/18 0800)   Resp   15 (02/16/18 0800)     SpO2   98 % (02/16/18 0800)     Post Anesthesia Care and Evaluation    Patient location during evaluation: PHASE II  Patient participation: complete - patient participated  Level of consciousness: awake and sleepy but conscious  Pain score: 0  Pain management: adequate  Airway patency: patent  Anesthetic complications: No anesthetic complications    Cardiovascular status: acceptable  Respiratory status: acceptable  Hydration status: acceptable

## 2018-02-16 NOTE — PLAN OF CARE
Problem: Patient Care Overview (Adult)  Goal: Plan of Care Review  Outcome: Outcome(s) achieved Date Met: 02/16/18 02/16/18 0800   Patient Care Overview   Progress improving   Outcome Evaluation   Outcome Summary/Follow up Plan D/C CRITERIA MET   Coping/Psychosocial Response Interventions   Plan Of Care Reviewed With patient;significant other

## 2018-02-27 ENCOUNTER — TELEPHONE (OUTPATIENT)
Dept: UROLOGY | Facility: CLINIC | Age: 68
End: 2018-02-27

## 2018-02-27 NOTE — TELEPHONE ENCOUNTER
Patient called and said when he went to get his Cialis filled it was 79.00 a pill. The pharmacy told him about Sildenafil an wanted to know if Dr Griffith would prescribe that to him due to it was cheaper

## 2018-02-28 NOTE — TELEPHONE ENCOUNTER
Spoke with Dr. Griffith he stated that he doesn't write prescriptions for generic Cialis. Another alternative would be for the patient to get Sildenafil troches which is located at [facility]. I spoke with the pt about this he stated that he would like to try this. Dr. Griffith has written this for him and I will get this faxed over.

## 2018-04-17 ENCOUNTER — HOSPITAL ENCOUNTER (OUTPATIENT)
Dept: GENERAL RADIOLOGY | Facility: HOSPITAL | Age: 68
Discharge: HOME | End: 2018-04-17
Attending: FAMILY MEDICINE

## 2018-04-17 PROCEDURE — 71046 X-RAY EXAM CHEST 2 VIEWS: CPT

## 2018-08-06 LAB — PSA SERPL-MCNC: 0.94 NG/ML (ref 0–4)

## 2018-08-13 ENCOUNTER — OFFICE VISIT (OUTPATIENT)
Dept: UROLOGY | Facility: CLINIC | Age: 68
End: 2018-08-13

## 2018-08-13 VITALS — BODY MASS INDEX: 27.28 KG/M2 | WEIGHT: 180 LBS | HEIGHT: 68 IN | TEMPERATURE: 98.6 F

## 2018-08-13 DIAGNOSIS — N52.9 ERECTILE DYSFUNCTION, UNSPECIFIED ERECTILE DYSFUNCTION TYPE: ICD-10-CM

## 2018-08-13 DIAGNOSIS — C61 PROSTATE CANCER (HCC): Primary | ICD-10-CM

## 2018-08-13 PROCEDURE — 99213 OFFICE O/P EST LOW 20 MIN: CPT | Performed by: UROLOGY

## 2018-08-13 NOTE — PROGRESS NOTES
Mr. Palacios is 68 y.o. male    Chief Complaint   Patient presents with   • Prostate Cancer   • Erectile Dysfunction       History of Present Illness   He was initially diagnosed with prostate cancer about  2 year(s) ago. This was identified in the context of elevated psa.  Severity of the disease is best described as probable organ confined disease. Previous or current management includes external beam radiotherapy. Associated symptoms include erectile dysfunction. Currently his PSA is  0.94.     The following portions of the patient's history were reviewed and updated as appropriate: allergies, current medications, past family history, past medical history, past social history, past surgical history and problem list.    Review of Systems   Constitutional: Negative for chills and fever.   Gastrointestinal: Negative for abdominal pain, anal bleeding and blood in stool.   Genitourinary: Negative for decreased urine volume, difficulty urinating, discharge, dysuria, enuresis, flank pain, frequency, genital sores, hematuria, penile pain, penile swelling, scrotal swelling, testicular pain and urgency.         Current Outpatient Prescriptions:   •  ALPRAZolam (XANAX) 0.5 MG tablet, Take 0.5 mg by mouth 2 (Two) Times a Day As Needed for anxiety., Disp: , Rfl:   •  lansoprazole (PREVACID) 15 MG capsule, Take 15 mg by mouth Daily., Disp: , Rfl:   •  SILDENAFIL CITRATE PO, Take 94 mg by mouth., Disp: , Rfl:   •  triamterene-hydrochlorothiazide (DYAZIDE) 37.5-25 MG per capsule, Take 1 capsule by mouth Every Morning., Disp: , Rfl:     Past Medical History:   Diagnosis Date   • COPD (chronic obstructive pulmonary disease) (CMS/HCC)    • History of radiation therapy 01/03/2017    7560 cGy to prostate   • Hypertension    • Prostate cancer (CMS/HCC)        Past Surgical History:   Procedure Laterality Date   • COLONOSCOPY  07/08/2010    normal   • COLONOSCOPY  01/29/2004    small polyp at 25 cm,otherwise normal colonoscopy   •  "COLONOSCOPY N/A 2/16/2018    Procedure: COLONOSCOPY WITH ANESTHESIA;  Surgeon: Abhijeet Alonso DO;  Location: [facility] ENDOSCOPY;  Service:    • ENDOSCOPY  01/29/2004    gastritis   • PROSTATE BIOPSY         Social History     Social History   • Marital status: Single     Social History Main Topics   • Smoking status: Never Smoker   • Smokeless tobacco: Never Used   • Alcohol use No   • Drug use: No   • Sexual activity: Not Currently     Other Topics Concern   • Not on file       Family History   Problem Relation Age of Onset   • COPD Father    • Colon cancer Father    • Colon cancer Maternal Grandmother        Objective    Temp 98.6 °F (37 °C)   Ht 172.7 cm (68\")   Wt 81.6 kg (180 lb)   BMI 27.37 kg/m²     Physical Exam  Flat prostate consistent with postradiation    Office Visit on 02/12/2018   Component Date Value Ref Range Status   • PSA 08/06/2018 0.943  0.000 - 4.000 ng/mL Final       Results for orders placed or performed in visit on 02/12/18   PSA DIAGNOSTIC   Result Value Ref Range    PSA 0.943 0.000 - 4.000 ng/mL     Assessment and Plan    Frederic was seen today for prostate cancer and erectile dysfunction.    Diagnoses and all orders for this visit:    Prostate cancer (CMS/formerly [facility])  -     PSA DIAGNOSTIC; Future    Erectile dysfunction, unspecified erectile dysfunction type    PSA 0.94.  This is consistent with his anne.  No evidence for recurrence of his prostate cancer this time.  Continue to monitor with six-month PSA.  He does have known erectile dysfunction and states this is not worsening.  He was previously on the Cialis but I wrote him for the sildenafil eleni due to cost.  He has been cutting these into quarters under the direction of his pharmacist.  He states that the quarter of a 94 mg sildenafil eleni works well for him and he would like to continue on this.  He does not need a refill this time but will call in the future if he does  "

## 2019-01-22 DIAGNOSIS — C61 PROSTATE CANCER (HCC): ICD-10-CM

## 2019-01-22 LAB — PSA SERPL-MCNC: 0.46 NG/ML (ref 0–4)

## 2019-02-11 ENCOUNTER — OFFICE VISIT (OUTPATIENT)
Dept: UROLOGY | Facility: CLINIC | Age: 69
End: 2019-02-11

## 2019-02-11 VITALS — HEIGHT: 68 IN | BODY MASS INDEX: 29.07 KG/M2 | WEIGHT: 191.8 LBS | TEMPERATURE: 98.7 F

## 2019-02-11 DIAGNOSIS — C61 PROSTATE CANCER (HCC): Primary | ICD-10-CM

## 2019-02-11 DIAGNOSIS — N52.9 ERECTILE DYSFUNCTION, UNSPECIFIED ERECTILE DYSFUNCTION TYPE: ICD-10-CM

## 2019-02-11 PROCEDURE — 99213 OFFICE O/P EST LOW 20 MIN: CPT | Performed by: UROLOGY

## 2019-02-11 NOTE — PATIENT INSTRUCTIONS

## 2019-02-11 NOTE — PROGRESS NOTES
Mr. Palacios is 69 y.o. male    Chief Complaint   Patient presents with   • Prostate Cancer   • Erectile Dysfunction       History of Present Illness   He was initially diagnosed with prostate cancer about  several month(s) ago. This was identified in the context of elevated psa.  Severity of the disease is best described as probable organ confined disease. Previous or current management includes external beam radiotherapy. Associated symptoms include erectile dysfunction. Currently his PSA is  0.45.     The following portions of the patient's history were reviewed and updated as appropriate: allergies, current medications, past family history, past medical history, past social history, past surgical history and problem list.    Review of Systems   Constitutional: Negative for chills and fever.   Gastrointestinal: Negative for abdominal pain, anal bleeding and blood in stool.   Genitourinary: Negative for decreased urine volume, difficulty urinating, discharge, dysuria, enuresis, flank pain, frequency, genital sores, hematuria, penile pain, penile swelling, scrotal swelling, testicular pain and urgency.       I have reviewed the review of systems      Current Outpatient Medications:   •  ALPRAZolam (XANAX) 0.5 MG tablet, Take 0.5 mg by mouth 2 (Two) Times a Day As Needed for anxiety., Disp: , Rfl:   •  lansoprazole (PREVACID) 15 MG capsule, Take 15 mg by mouth Daily., Disp: , Rfl:   •  SILDENAFIL CITRATE PO, Take 94 mg by mouth., Disp: , Rfl:   •  triamterene-hydrochlorothiazide (DYAZIDE) 37.5-25 MG per capsule, Take 1 capsule by mouth Every Morning., Disp: , Rfl:     Past Medical History:   Diagnosis Date   • COPD (chronic obstructive pulmonary disease) (CMS/HCC)    • History of radiation therapy 01/03/2017    7560 cGy to prostate   • Hypertension    • Prostate cancer (CMS/HCC)        Past Surgical History:   Procedure Laterality Date   • COLONOSCOPY  07/08/2010    normal   • COLONOSCOPY  01/29/2004    small polyp  "at 25 cm,otherwise normal colonoscopy   • COLONOSCOPY N/A 2/16/2018    Procedure: COLONOSCOPY WITH ANESTHESIA;  Surgeon: Abhijeet Alonso DO;  Location: [facility] ENDOSCOPY;  Service:    • ENDOSCOPY  01/29/2004    gastritis   • PROSTATE BIOPSY         Social History     Socioeconomic History   • Marital status: Single     Spouse name: Not on file   • Number of children: Not on file   • Years of education: Not on file   • Highest education level: Not on file   Tobacco Use   • Smoking status: Never Smoker   • Smokeless tobacco: Never Used   Substance and Sexual Activity   • Alcohol use: No   • Drug use: No   • Sexual activity: Not Currently       Family History   Problem Relation Age of Onset   • COPD Father    • Colon cancer Father    • Colon cancer Maternal Grandmother        Objective    Temp 98.7 °F (37.1 °C)   Ht 172.7 cm (68\")   Wt 87 kg (191 lb 12.8 oz)   BMI 29.16 kg/m²     Physical Exam    Orders Only on 01/22/2019   Component Date Value Ref Range Status   • PSA 01/22/2019 0.456  0.000 - 4.000 ng/mL Final       Results for orders placed or performed in visit on 02/11/19   POC Urinalysis Dipstick, Multipro   Result Value Ref Range    Color Yellow Yellow, Straw, Dark Yellow, Aleida    Clarity, UA Clear Clear    Glucose, UA Negative Negative, 1000 mg/dL (3+) mg/dL    Bilirubin Negative Negative    Ketones, UA Negative Negative    Specific Gravity  1.010 1.005 - 1.030    Blood, UA Negative Negative    pH, Urine 6.0 5.0 - 8.0    Protein, POC Negative Negative mg/dL    Urobilinogen, UA Normal Normal    Nitrite, UA Negative Negative    Leukocytes Negative Negative     Patient's Body mass index is 29.16 kg/m². BMI is above normal parameters. Recommendations include: educational material.    Assessment and Plan    Frederic was seen today for prostate cancer and erectile dysfunction.    Diagnoses and all orders for this visit:    Prostate cancer (CMS/[facility])  -     POC Urinalysis Dipstick, Multipro  -     PSA DIAGNOSTIC; " Future    Erectile dysfunction, unspecified erectile dysfunction type  -     POC Urinalysis Dipstick, Multipro    Prostate cancer and erectile dysfunction, 2 chronic stable conditions.  PSA is 0.45.  Again this is lower than his anne.  No signs or symptoms of recurrent prostate cancer.  I will repeat his PSA in 6 months.  With regard to his erectile dysfunction, he is overall very happy with the results of the sildenafil eleni.  He is using one quarter of each dose.  I have rewritten this for him today.

## 2019-08-10 ENCOUNTER — RESULTS ENCOUNTER (OUTPATIENT)
Dept: UROLOGY | Facility: CLINIC | Age: 69
End: 2019-08-10

## 2019-08-10 DIAGNOSIS — C61 PROSTATE CANCER (HCC): ICD-10-CM

## 2019-08-13 DIAGNOSIS — C61 PROSTATE CANCER (HCC): Primary | ICD-10-CM

## 2019-09-06 LAB
ALBUMIN SERPL-MCNC: 4 G/DL (ref 3.5–5.2)
ALP BLD-CCNC: 84 U/L (ref 40–130)
ALT SERPL-CCNC: 8 U/L (ref 5–41)
ANION GAP SERPL CALCULATED.3IONS-SCNC: 15 MMOL/L (ref 7–19)
AST SERPL-CCNC: 13 U/L (ref 5–40)
BASOPHILS ABSOLUTE: 0.1 K/UL (ref 0–0.2)
BASOPHILS RELATIVE PERCENT: 0.9 % (ref 0–1)
BILIRUB SERPL-MCNC: 0.7 MG/DL (ref 0.2–1.2)
BUN BLDV-MCNC: 18 MG/DL (ref 8–23)
CALCIUM SERPL-MCNC: 8.9 MG/DL (ref 8.8–10.2)
CHLORIDE BLD-SCNC: 107 MMOL/L (ref 98–111)
CHOLESTEROL, TOTAL: 179 MG/DL (ref 160–199)
CO2: 24 MMOL/L (ref 22–29)
CREAT SERPL-MCNC: 1.3 MG/DL (ref 0.5–1.2)
EOSINOPHILS ABSOLUTE: 0.2 K/UL (ref 0–0.6)
EOSINOPHILS RELATIVE PERCENT: 2 % (ref 0–5)
GFR NON-AFRICAN AMERICAN: 55
GLUCOSE BLD-MCNC: 100 MG/DL (ref 74–109)
HCT VFR BLD CALC: 43 % (ref 42–52)
HDLC SERPL-MCNC: 53 MG/DL (ref 55–121)
HEMOGLOBIN: 13.4 G/DL (ref 14–18)
IMMATURE GRANULOCYTES #: 0.1 K/UL
LDL CHOLESTEROL CALCULATED: 110 MG/DL
LYMPHOCYTES ABSOLUTE: 1.2 K/UL (ref 1.1–4.5)
LYMPHOCYTES RELATIVE PERCENT: 16.6 % (ref 20–40)
MCH RBC QN AUTO: 27.1 PG (ref 27–31)
MCHC RBC AUTO-ENTMCNC: 31.2 G/DL (ref 33–37)
MCV RBC AUTO: 87 FL (ref 80–94)
MONOCYTES ABSOLUTE: 0.6 K/UL (ref 0–0.9)
MONOCYTES RELATIVE PERCENT: 8 % (ref 0–10)
NEUTROPHILS ABSOLUTE: 5.3 K/UL (ref 1.5–7.5)
NEUTROPHILS RELATIVE PERCENT: 71.4 % (ref 50–65)
PDW BLD-RTO: 14.3 % (ref 11.5–14.5)
PLATELET # BLD: 266 K/UL (ref 130–400)
PMV BLD AUTO: 11 FL (ref 9.4–12.4)
POTASSIUM SERPL-SCNC: 3.6 MMOL/L (ref 3.5–5)
PROSTATE SPECIFIC ANTIGEN: 0.72 NG/ML (ref 0–4)
RBC # BLD: 4.94 M/UL (ref 4.7–6.1)
SODIUM BLD-SCNC: 146 MMOL/L (ref 136–145)
TOTAL PROTEIN: 7.3 G/DL (ref 6.6–8.7)
TRIGL SERPL-MCNC: 80 MG/DL (ref 0–149)
WBC # BLD: 7.4 K/UL (ref 4.8–10.8)

## 2020-01-09 ENCOUNTER — TRANSCRIBE ORDERS (OUTPATIENT)
Dept: ADMINISTRATIVE | Facility: HOSPITAL | Age: 70
End: 2020-01-09

## 2020-01-09 ENCOUNTER — APPOINTMENT (OUTPATIENT)
Dept: LAB | Facility: HOSPITAL | Age: 70
End: 2020-01-09

## 2020-01-09 ENCOUNTER — HOSPITAL ENCOUNTER (OUTPATIENT)
Dept: GENERAL RADIOLOGY | Facility: HOSPITAL | Age: 70
Discharge: HOME | End: 2020-01-09
Admitting: PEDIATRICS

## 2020-01-09 DIAGNOSIS — R50.9 FEVER, UNSPECIFIED: Primary | ICD-10-CM

## 2020-01-09 DIAGNOSIS — R05.9 COUGH: Primary | ICD-10-CM

## 2020-01-09 LAB
ALBUMIN SERPL-MCNC: 3.7 G/DL (ref 3.5–5)
ALBUMIN/GLOB SERPL: 1.1 G/DL (ref 1.1–2.5)
ALP SERPL-CCNC: 113 U/L (ref 24–120)
ALT SERPL W P-5'-P-CCNC: 43 U/L (ref 0–54)
ANION GAP SERPL CALCULATED.3IONS-SCNC: 9 MMOL/L (ref 4–13)
AST SERPL-CCNC: 42 U/L (ref 7–45)
AUTO MIXED CELLS #: 0.5 10*3/MM3 (ref 0.1–2.6)
AUTO MIXED CELLS %: 11.6 % (ref 0.1–24)
BILIRUB SERPL-MCNC: 0.5 MG/DL (ref 0.1–1)
BUN BLD-MCNC: 20 MG/DL (ref 5–21)
BUN/CREAT SERPL: 12.9
CALCIUM SPEC-SCNC: 8.2 MG/DL (ref 8.4–10.4)
CHLORIDE SERPL-SCNC: 103 MMOL/L (ref 98–110)
CO2 SERPL-SCNC: 27 MMOL/L (ref 24–31)
CREAT BLD-MCNC: 1.55 MG/DL (ref 0.5–1.4)
ERYTHROCYTE [DISTWIDTH] IN BLOOD BY AUTOMATED COUNT: 13.8 % (ref 12.3–15.4)
GFR SERPL CREATININE-BSD FRML MDRD: 45 ML/MIN/1.73
GLOBULIN UR ELPH-MCNC: 3.5 GM/DL
GLUCOSE BLD-MCNC: 125 MG/DL (ref 70–100)
HCT VFR BLD AUTO: 41.1 % (ref 37.5–51)
HGB BLD-MCNC: 13.3 G/DL (ref 13–17.7)
LYMPHOCYTES # BLD AUTO: 0.5 10*3/MM3 (ref 0.7–3.1)
LYMPHOCYTES NFR BLD AUTO: 12.3 % (ref 19.6–45.3)
MCH RBC QN AUTO: 27 PG (ref 26.6–33)
MCHC RBC AUTO-ENTMCNC: 32.4 G/DL (ref 31.5–35.7)
MCV RBC AUTO: 83.5 FL (ref 79–97)
NEUTROPHILS # BLD AUTO: 3.3 10*3/MM3 (ref 1.7–7)
NEUTROPHILS NFR BLD AUTO: 76.1 % (ref 42.7–76)
PLATELET # BLD AUTO: 203 10*3/MM3 (ref 140–450)
PMV BLD AUTO: 10.2 FL (ref 6–12)
POTASSIUM BLD-SCNC: 3.5 MMOL/L (ref 3.5–5.3)
PROT SERPL-MCNC: 7.2 G/DL (ref 6.3–8.7)
RBC # BLD AUTO: 4.92 10*6/MM3 (ref 4.14–5.8)
SODIUM BLD-SCNC: 139 MMOL/L (ref 135–145)
WBC NRBC COR # BLD: 4.3 10*3/MM3 (ref 3.4–10.8)

## 2020-01-09 PROCEDURE — 71046 X-RAY EXAM CHEST 2 VIEWS: CPT

## 2020-01-09 PROCEDURE — 80053 COMPREHEN METABOLIC PANEL: CPT | Performed by: PEDIATRICS

## 2020-01-09 PROCEDURE — 85025 COMPLETE CBC W/AUTO DIFF WBC: CPT | Performed by: PEDIATRICS

## 2020-01-09 PROCEDURE — 36415 COLL VENOUS BLD VENIPUNCTURE: CPT | Performed by: PEDIATRICS

## 2020-03-29 ENCOUNTER — APPOINTMENT (OUTPATIENT)
Dept: GENERAL RADIOLOGY | Age: 70
End: 2020-03-29
Payer: MEDICARE

## 2020-03-29 ENCOUNTER — HOSPITAL ENCOUNTER (OUTPATIENT)
Age: 70
Setting detail: OBSERVATION
Discharge: HOME | End: 2020-03-30
Attending: EMERGENCY MEDICINE | Admitting: HOSPITALIST
Payer: MEDICARE

## 2020-03-29 PROBLEM — R07.9 CHEST PAIN: Status: ACTIVE | Noted: 2020-03-29

## 2020-03-29 LAB
ALBUMIN SERPL-MCNC: 4 G/DL (ref 3.5–5.2)
ALP BLD-CCNC: 66 U/L (ref 40–130)
ALT SERPL-CCNC: 12 U/L (ref 5–41)
ANION GAP SERPL CALCULATED.3IONS-SCNC: 13 MMOL/L (ref 7–19)
AST SERPL-CCNC: 15 U/L (ref 5–40)
BASOPHILS ABSOLUTE: 0.1 K/UL (ref 0–0.2)
BASOPHILS RELATIVE PERCENT: 0.8 % (ref 0–1)
BILIRUB SERPL-MCNC: 0.3 MG/DL (ref 0.2–1.2)
BUN BLDV-MCNC: 18 MG/DL (ref 8–23)
CALCIUM SERPL-MCNC: 9.1 MG/DL (ref 8.8–10.2)
CHLORIDE BLD-SCNC: 101 MMOL/L (ref 98–111)
CO2: 25 MMOL/L (ref 22–29)
CREAT SERPL-MCNC: 1.3 MG/DL (ref 0.5–1.2)
EOSINOPHILS ABSOLUTE: 0.2 K/UL (ref 0–0.6)
EOSINOPHILS RELATIVE PERCENT: 3.5 % (ref 0–5)
GFR NON-AFRICAN AMERICAN: 55
GLUCOSE BLD-MCNC: 122 MG/DL (ref 74–109)
HBA1C MFR BLD: 5.2 % (ref 4–6)
HCT VFR BLD CALC: 40.2 % (ref 42–52)
HEMOGLOBIN: 12.9 G/DL (ref 14–18)
IMMATURE GRANULOCYTES #: 0.1 K/UL
LYMPHOCYTES ABSOLUTE: 1.4 K/UL (ref 1.1–4.5)
LYMPHOCYTES RELATIVE PERCENT: 22.4 % (ref 20–40)
MAGNESIUM: 2 MG/DL (ref 1.6–2.4)
MAGNESIUM: 2 MG/DL (ref 1.6–2.4)
MCH RBC QN AUTO: 28.1 PG (ref 27–31)
MCHC RBC AUTO-ENTMCNC: 32.1 G/DL (ref 33–37)
MCV RBC AUTO: 87.6 FL (ref 80–94)
MONOCYTES ABSOLUTE: 0.7 K/UL (ref 0–0.9)
MONOCYTES RELATIVE PERCENT: 11 % (ref 0–10)
NEUTROPHILS ABSOLUTE: 3.9 K/UL (ref 1.5–7.5)
NEUTROPHILS RELATIVE PERCENT: 61.4 % (ref 50–65)
PDW BLD-RTO: 14.2 % (ref 11.5–14.5)
PLATELET # BLD: 282 K/UL (ref 130–400)
PMV BLD AUTO: 9.7 FL (ref 9.4–12.4)
POTASSIUM REFLEX MAGNESIUM: 3.5 MMOL/L (ref 3.5–5)
RBC # BLD: 4.59 M/UL (ref 4.7–6.1)
SODIUM BLD-SCNC: 139 MMOL/L (ref 136–145)
TOTAL PROTEIN: 7 G/DL (ref 6.6–8.7)
TROPONIN: 0.01 NG/ML (ref 0–0.03)
TROPONIN: 0.02 NG/ML (ref 0–0.03)
TSH REFLEX FT4: 3.47 UIU/ML (ref 0.35–5.5)
WBC # BLD: 6.4 K/UL (ref 4.8–10.8)

## 2020-03-29 PROCEDURE — 85025 COMPLETE CBC W/AUTO DIFF WBC: CPT

## 2020-03-29 PROCEDURE — 96374 THER/PROPH/DIAG INJ IV PUSH: CPT

## 2020-03-29 PROCEDURE — 84443 ASSAY THYROID STIM HORMONE: CPT

## 2020-03-29 PROCEDURE — 96372 THER/PROPH/DIAG INJ SC/IM: CPT

## 2020-03-29 PROCEDURE — 6370000000 HC RX 637 (ALT 250 FOR IP): Performed by: INTERNAL MEDICINE

## 2020-03-29 PROCEDURE — G0378 HOSPITAL OBSERVATION PER HR: HCPCS

## 2020-03-29 PROCEDURE — 36415 COLL VENOUS BLD VENIPUNCTURE: CPT

## 2020-03-29 PROCEDURE — 71045 X-RAY EXAM CHEST 1 VIEW: CPT

## 2020-03-29 PROCEDURE — 83036 HEMOGLOBIN GLYCOSYLATED A1C: CPT

## 2020-03-29 PROCEDURE — 84484 ASSAY OF TROPONIN QUANT: CPT

## 2020-03-29 PROCEDURE — 2580000003 HC RX 258: Performed by: INTERNAL MEDICINE

## 2020-03-29 PROCEDURE — 6370000000 HC RX 637 (ALT 250 FOR IP): Performed by: EMERGENCY MEDICINE

## 2020-03-29 PROCEDURE — 99285 EMERGENCY DEPT VISIT HI MDM: CPT

## 2020-03-29 PROCEDURE — 2500000003 HC RX 250 WO HCPCS: Performed by: INTERNAL MEDICINE

## 2020-03-29 PROCEDURE — 83735 ASSAY OF MAGNESIUM: CPT

## 2020-03-29 PROCEDURE — 93005 ELECTROCARDIOGRAM TRACING: CPT | Performed by: EMERGENCY MEDICINE

## 2020-03-29 PROCEDURE — 6360000002 HC RX W HCPCS: Performed by: INTERNAL MEDICINE

## 2020-03-29 PROCEDURE — 80053 COMPREHEN METABOLIC PANEL: CPT

## 2020-03-29 RX ORDER — HEPARIN SODIUM 5000 [USP'U]/ML
5000 INJECTION, SOLUTION INTRAVENOUS; SUBCUTANEOUS EVERY 8 HOURS SCHEDULED
Status: DISCONTINUED | OUTPATIENT
Start: 2020-03-29 | End: 2020-03-30 | Stop reason: HOSPADM

## 2020-03-29 RX ORDER — TRIAMTERENE AND HYDROCHLOROTHIAZIDE 37.5; 25 MG/1; MG/1
1 CAPSULE ORAL EVERY MORNING
COMMUNITY

## 2020-03-29 RX ORDER — ACETAMINOPHEN 650 MG/1
650 SUPPOSITORY RECTAL EVERY 6 HOURS PRN
Status: DISCONTINUED | OUTPATIENT
Start: 2020-03-29 | End: 2020-03-30 | Stop reason: HOSPADM

## 2020-03-29 RX ORDER — ASPIRIN 81 MG/1
324 TABLET, CHEWABLE ORAL ONCE
Status: COMPLETED | OUTPATIENT
Start: 2020-03-29 | End: 2020-03-29

## 2020-03-29 RX ORDER — SODIUM CHLORIDE 0.9 % (FLUSH) 0.9 %
10 SYRINGE (ML) INJECTION PRN
Status: DISCONTINUED | OUTPATIENT
Start: 2020-03-29 | End: 2020-03-30 | Stop reason: HOSPADM

## 2020-03-29 RX ORDER — ASPIRIN 81 MG/1
81 TABLET, CHEWABLE ORAL DAILY
Status: DISCONTINUED | OUTPATIENT
Start: 2020-03-30 | End: 2020-03-30 | Stop reason: HOSPADM

## 2020-03-29 RX ORDER — PANTOPRAZOLE SODIUM 20 MG/1
20 TABLET, DELAYED RELEASE ORAL
Status: DISCONTINUED | OUTPATIENT
Start: 2020-03-30 | End: 2020-03-29

## 2020-03-29 RX ORDER — TRIAMTERENE AND HYDROCHLOROTHIAZIDE 37.5; 25 MG/1; MG/1
1 CAPSULE ORAL EVERY MORNING
Status: DISCONTINUED | OUTPATIENT
Start: 2020-03-30 | End: 2020-03-30 | Stop reason: HOSPADM

## 2020-03-29 RX ORDER — ACETAMINOPHEN 325 MG/1
650 TABLET ORAL EVERY 6 HOURS PRN
Status: DISCONTINUED | OUTPATIENT
Start: 2020-03-29 | End: 2020-03-30 | Stop reason: HOSPADM

## 2020-03-29 RX ORDER — LANSOPRAZOLE 15 MG/1
15 CAPSULE, DELAYED RELEASE ORAL DAILY
COMMUNITY

## 2020-03-29 RX ORDER — NITROGLYCERIN 0.4 MG/1
0.4 TABLET SUBLINGUAL EVERY 5 MIN PRN
Status: DISCONTINUED | OUTPATIENT
Start: 2020-03-29 | End: 2020-03-30 | Stop reason: HOSPADM

## 2020-03-29 RX ORDER — SODIUM CHLORIDE 0.9 % (FLUSH) 0.9 %
10 SYRINGE (ML) INJECTION EVERY 12 HOURS SCHEDULED
Status: DISCONTINUED | OUTPATIENT
Start: 2020-03-29 | End: 2020-03-30 | Stop reason: HOSPADM

## 2020-03-29 RX ORDER — ATORVASTATIN CALCIUM 40 MG/1
40 TABLET, FILM COATED ORAL NIGHTLY
Status: DISCONTINUED | OUTPATIENT
Start: 2020-03-29 | End: 2020-03-30 | Stop reason: HOSPADM

## 2020-03-29 RX ADMIN — FAMOTIDINE 20 MG: 10 INJECTION, SOLUTION INTRAVENOUS at 23:56

## 2020-03-29 RX ADMIN — ATORVASTATIN CALCIUM 40 MG: 40 TABLET, FILM COATED ORAL at 23:56

## 2020-03-29 RX ADMIN — HEPARIN SODIUM 5000 UNITS: 5000 INJECTION INTRAVENOUS; SUBCUTANEOUS at 23:55

## 2020-03-29 RX ADMIN — SODIUM CHLORIDE, PRESERVATIVE FREE 10 ML: 5 INJECTION INTRAVENOUS at 23:56

## 2020-03-29 RX ADMIN — ASPIRIN 81 MG 324 MG: 81 TABLET ORAL at 18:32

## 2020-03-29 SDOH — HEALTH STABILITY: MENTAL HEALTH: HOW OFTEN DO YOU HAVE A DRINK CONTAINING ALCOHOL?: NEVER

## 2020-03-29 ASSESSMENT — ENCOUNTER SYMPTOMS
NAUSEA: 0
SHORTNESS OF BREATH: 0
VOMITING: 0

## 2020-03-29 ASSESSMENT — PAIN DESCRIPTION - LOCATION: LOCATION: CHEST

## 2020-03-29 ASSESSMENT — PAIN SCALES - GENERAL
PAINLEVEL_OUTOF10: 0
PAINLEVEL_OUTOF10: 4

## 2020-03-29 ASSESSMENT — PAIN DESCRIPTION - PAIN TYPE: TYPE: ACUTE PAIN

## 2020-03-29 NOTE — ED PROVIDER NOTES
reviewed. No pertinent surgical history. CURRENT MEDICATIONS       Current Discharge Medication List      CONTINUE these medications which have NOT CHANGED    Details   triamterene-hydroCHLOROthiazide (DYAZIDE) 37.5-25 MG per capsule Take 1 capsule by mouth every morning      lansoprazole (PREVACID) 15 MG delayed release capsule Take 15 mg by mouth daily             ALLERGIES     Patient has no known allergies.     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 [employer] Occupation: built [employer] as a hobby    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   Social Needs    Financial resource strain: None    Food insecurity     Worry: None     Inability: None    Transportation needs     Medical: None     Non-medical: None   Tobacco Use    Smoking status: Never Smoker    Smokeless tobacco: Never Used   Substance and Sexual Activity    Alcohol use: Yes     Comment: about 1 drink a month, never drank more    Drug use: Never    Sexual activity: None     Comment: has 2 daughters   Lifestyle    Physical activity     Days per week: None     Minutes per session: None    Stress: None   Relationships    Social connections     Talks on phone: None     Gets together: None     Attends Protestant service: None     Active member of club or organization: None     Attends meetings of clubs or organizations: None     Relationship status: None    Intimate partner violence     Fear of current or ex partner: None     Emotionally abused: None     Physically abused: None     Forced sexual activity: None   Other Topics Concern    None   Social History Narrative    CODE STATUS: Full Code    HEALTH CARE PROXY: his ex-wife, Mrs. [place]: independently    DOMICILED: has an apartment, lives alone, has no pets, no steps       SCREENINGS    Rodo Coma Scale  Eye Opening: Spontaneous  Best Verbal Response: Oriented  Best Motor Response: Obeys commands  Warriors Mark Coma Scale Score: 15 @FLOW([id number])@      PHYSICAL EXAM    (up to 7 for level 4, 8 or more for level 5)     ED Triage Vitals   BP Temp Temp src Pulse Resp SpO2 Height Weight   03/29/20 1811 03/29/20 1811 -- 03/29/20 1811 03/29/20 1811 03/29/20 1811 03/29/20 1809 03/29/20 1809   134/78 97.6 °F (36.4 °C)  78 16 97 % 5' 8\" (1.727 m) 185 lb (83.9 kg)       Physical Exam  Vitals signs and nursing note reviewed. Constitutional:       General: He is not in acute distress. Appearance: Normal appearance. He is normal weight. He is not ill-appearing, toxic-appearing or diaphoretic. HENT:      Nose: Nose normal.      Mouth/Throat:      Mouth: Mucous membranes are moist.      Pharynx: Oropharynx is clear. Eyes:      Conjunctiva/sclera: Conjunctivae normal.   Neck:      Musculoskeletal: Normal range of motion and neck supple. Cardiovascular:      Rate and Rhythm: Normal rate and regular rhythm. Heart sounds: Normal heart sounds. Pulmonary:      Effort: Pulmonary effort is normal.      Breath sounds: Normal breath sounds. Musculoskeletal:      Right lower leg: No edema. Left lower leg: No edema. Skin:     General: Skin is warm and dry. Neurological:      General: No focal deficit present. Mental Status: He is alert and oriented to person, place, and time.    Psychiatric:         Mood and Affect: Mood normal.         DIAGNOSTIC RESULTS     EKG: All EKG's are interpreted by the Emergency Department Physician who either signs or Co-signsthis chart in the absence of a cardiologist.    EKG:  Regular rate and rhythm. Normal P waves and UT interval. Normal QRS. Normal QT interval. No ST elevation or depression. This EKG was interpreted by me. RADIOLOGY:   Non-plain filmimages such as CT, Ultrasound and MRI are read by the radiologist. Plain radiographic images are visualized and preliminarily interpreted by the emergency physician with the below findings:        Interpretation per the Radiologist below, if available at the time ofthis note:    XR CHEST PORTABLE   Final Result   Opacities in the medial right lung base are favored to   represent atelectasis. Otherwise, no evidence of acute cardiopulmonary   process.    Signed by Dr Js Conn on 3/29/2020 6:38 PM      NM MYOCARDIAL SPECT REST EXERCISE OR RX    (Results Pending)         ED BEDSIDE ULTRASOUND:   Performed by ED Physician - none    LABS:  Labs Reviewed   CBC WITH AUTO DIFFERENTIAL - Abnormal; Notable for the following components:       Result Value    RBC 4.59 (*)     Hemoglobin 12.9 (*)     Hematocrit 40.2 (*)     MCHC 32.1 (*)     Monocytes % 11.0 (*)     All other components within normal limits   COMPREHENSIVE METABOLIC PANEL W/ REFLEX TO MG FOR LOW K - Abnormal; Notable for the following components:    Glucose 122 (*)     CREATININE 1.3 (*)     GFR Non- 55 (*)     All other components within normal limits   COMPREHENSIVE METABOLIC PANEL W/ REFLEX TO MG FOR LOW K - Abnormal; Notable for the following components:    CREATININE 1.5 (*)     GFR Non- 46 (*)     Total Protein 6.0 (*)     All other components within normal limits   LIPID PANEL - Abnormal; Notable for the following components:    Triglycerides 193 (*)     HDL 41 (*)     All other components within normal limits   CBC WITH AUTO DIFFERENTIAL - Abnormal; Notable for the following components:    RBC 4.51 (*) Hemoglobin 12.7 (*)     Hematocrit 40.5 (*)     MCHC 31.4 (*)     Neutrophils % 68.1 (*)     Lymphocytes % 16.0 (*)     Monocytes % 10.3 (*)     Basophils % 1.1 (*)     All other components within normal limits   TROPONIN   MAGNESIUM   HEMOGLOBIN A1C   TSH WITH REFLEX TO FT4   MAGNESIUM   TROPONIN   TROPONIN       All other labs were within normal range or not returned as of this dictation. EMERGENCY DEPARTMENT COURSE and DIFFERENTIAL DIAGNOSIS/MDM:   Vitals:    Vitals:    03/30/20 0151 03/30/20 0243 03/30/20 0337 03/30/20 0649   BP: 133/75 (!) 140/72  134/61   Pulse: 68 66  61   Resp: 18 18  16   Temp: 97.7 °F (36.5 °C) 98.1 °F (36.7 °C)  98.3 °F (36.8 °C)   TempSrc: Temporal Temporal  Temporal   SpO2: 97% 96%  97%   Weight:   188 lb 6.4 oz (85.5 kg)    Height:               MDM  Number of Diagnoses or Management Options  Chest discomfort:   Diagnosis management comments: Discussed with Dr. Violet Hauser - admit      CRITICAL CARE TIME   Total Critical Care time was 0 minutes, excluding separately reportable procedures. There was a high probability of clinically significant/lifethreatening deterioration in the patient's condition which required my urgent intervention. CONSULTS:  IP CONSULT TO CARDIOLOGY    PROCEDURES:  Unless otherwise noted below, none     Procedures    FINAL IMPRESSION      1.  Chest discomfort          DISPOSITION/PLAN   DISPOSITION        PATIENT REFERRED TO:  Nidia Kovacs MD  [facility]  [phone]            DISCHARGE MEDICATIONS:  Current Discharge Medication List             (Please note that portions of this note were completed with a voice recognition program.  Efforts were made to edit the dictations but occasionally words are mis-transcribed.)    Aldo Corrales MD (electronically signed)  Attending Emergency Physician          Aldo Corrales MD  03/30/20 6343

## 2020-03-30 ENCOUNTER — APPOINTMENT (OUTPATIENT)
Dept: NUCLEAR MEDICINE | Age: 70
End: 2020-03-30
Payer: MEDICARE

## 2020-03-30 VITALS
DIASTOLIC BLOOD PRESSURE: 72 MMHG | TEMPERATURE: 97 F | OXYGEN SATURATION: 95 % | RESPIRATION RATE: 16 BRPM | WEIGHT: 188.4 LBS | HEART RATE: 68 BPM | HEIGHT: 68 IN | SYSTOLIC BLOOD PRESSURE: 144 MMHG | BODY MASS INDEX: 28.55 KG/M2

## 2020-03-30 PROBLEM — R07.9 CHEST PAIN: Status: RESOLVED | Noted: 2020-03-29 | Resolved: 2020-03-30

## 2020-03-30 PROBLEM — R07.89 CHEST DISCOMFORT: Status: ACTIVE | Noted: 2020-03-30

## 2020-03-30 LAB
ALBUMIN SERPL-MCNC: 3.7 G/DL (ref 3.5–5.2)
ALP BLD-CCNC: 64 U/L (ref 40–130)
ALT SERPL-CCNC: 12 U/L (ref 5–41)
ANION GAP SERPL CALCULATED.3IONS-SCNC: 13 MMOL/L (ref 7–19)
AST SERPL-CCNC: 15 U/L (ref 5–40)
BASOPHILS ABSOLUTE: 0.1 K/UL (ref 0–0.2)
BASOPHILS RELATIVE PERCENT: 1.1 % (ref 0–1)
BILIRUB SERPL-MCNC: 0.3 MG/DL (ref 0.2–1.2)
BUN BLDV-MCNC: 18 MG/DL (ref 8–23)
CALCIUM SERPL-MCNC: 8.9 MG/DL (ref 8.8–10.2)
CHLORIDE BLD-SCNC: 104 MMOL/L (ref 98–111)
CHOLESTEROL, TOTAL: 174 MG/DL (ref 160–199)
CO2: 26 MMOL/L (ref 22–29)
CREAT SERPL-MCNC: 1.5 MG/DL (ref 0.5–1.2)
EKG P AXIS: 179 DEGREES
EKG P-R INTERVAL: 178 MS
EKG Q-T INTERVAL: 358 MS
EKG QRS DURATION: 82 MS
EKG QTC CALCULATION (BAZETT): 361 MS
EKG T AXIS: -130 DEGREES
EOSINOPHILS ABSOLUTE: 0.2 K/UL (ref 0–0.6)
EOSINOPHILS RELATIVE PERCENT: 3.4 % (ref 0–5)
GFR NON-AFRICAN AMERICAN: 46
GLUCOSE BLD-MCNC: 108 MG/DL (ref 74–109)
HCT VFR BLD CALC: 40.5 % (ref 42–52)
HDLC SERPL-MCNC: 41 MG/DL (ref 55–121)
HEMOGLOBIN: 12.7 G/DL (ref 14–18)
IMMATURE GRANULOCYTES #: 0.1 K/UL
LDL CHOLESTEROL CALCULATED: 94 MG/DL
LV EF: 55 %
LVEF MODALITY: NORMAL
LYMPHOCYTES ABSOLUTE: 1.1 K/UL (ref 1.1–4.5)
LYMPHOCYTES RELATIVE PERCENT: 16 % (ref 20–40)
MCH RBC QN AUTO: 28.2 PG (ref 27–31)
MCHC RBC AUTO-ENTMCNC: 31.4 G/DL (ref 33–37)
MCV RBC AUTO: 89.8 FL (ref 80–94)
MONOCYTES ABSOLUTE: 0.7 K/UL (ref 0–0.9)
MONOCYTES RELATIVE PERCENT: 10.3 % (ref 0–10)
NEUTROPHILS ABSOLUTE: 4.8 K/UL (ref 1.5–7.5)
NEUTROPHILS RELATIVE PERCENT: 68.1 % (ref 50–65)
PDW BLD-RTO: 14.3 % (ref 11.5–14.5)
PLATELET # BLD: 279 K/UL (ref 130–400)
PMV BLD AUTO: 10.6 FL (ref 9.4–12.4)
POTASSIUM REFLEX MAGNESIUM: 3.8 MMOL/L (ref 3.5–5)
RBC # BLD: 4.51 M/UL (ref 4.7–6.1)
SODIUM BLD-SCNC: 143 MMOL/L (ref 136–145)
TOTAL PROTEIN: 6 G/DL (ref 6.6–8.7)
TRIGL SERPL-MCNC: 193 MG/DL (ref 0–149)
TROPONIN: 0.01 NG/ML (ref 0–0.03)
WBC # BLD: 7.1 K/UL (ref 4.8–10.8)

## 2020-03-30 PROCEDURE — 6370000000 HC RX 637 (ALT 250 FOR IP): Performed by: HOSPITALIST

## 2020-03-30 PROCEDURE — 80053 COMPREHEN METABOLIC PANEL: CPT

## 2020-03-30 PROCEDURE — G0378 HOSPITAL OBSERVATION PER HR: HCPCS

## 2020-03-30 PROCEDURE — 2500000003 HC RX 250 WO HCPCS: Performed by: INTERNAL MEDICINE

## 2020-03-30 PROCEDURE — 3430000000 HC RX DIAGNOSTIC RADIOPHARMACEUTICAL: Performed by: INTERNAL MEDICINE

## 2020-03-30 PROCEDURE — 93005 ELECTROCARDIOGRAM TRACING: CPT | Performed by: INTERNAL MEDICINE

## 2020-03-30 PROCEDURE — 96376 TX/PRO/DX INJ SAME DRUG ADON: CPT

## 2020-03-30 PROCEDURE — A9500 TC99M SESTAMIBI: HCPCS | Performed by: INTERNAL MEDICINE

## 2020-03-30 PROCEDURE — 85025 COMPLETE CBC W/AUTO DIFF WBC: CPT

## 2020-03-30 PROCEDURE — 6370000000 HC RX 637 (ALT 250 FOR IP): Performed by: INTERNAL MEDICINE

## 2020-03-30 PROCEDURE — 84484 ASSAY OF TROPONIN QUANT: CPT

## 2020-03-30 PROCEDURE — 2580000003 HC RX 258: Performed by: INTERNAL MEDICINE

## 2020-03-30 PROCEDURE — 80061 LIPID PANEL: CPT

## 2020-03-30 PROCEDURE — 93306 TTE W/DOPPLER COMPLETE: CPT

## 2020-03-30 PROCEDURE — 78452 HT MUSCLE IMAGE SPECT MULT: CPT

## 2020-03-30 PROCEDURE — 36415 COLL VENOUS BLD VENIPUNCTURE: CPT

## 2020-03-30 PROCEDURE — 93010 ELECTROCARDIOGRAM REPORT: CPT | Performed by: INTERNAL MEDICINE

## 2020-03-30 PROCEDURE — 99205 OFFICE O/P NEW HI 60 MIN: CPT | Performed by: INTERNAL MEDICINE

## 2020-03-30 RX ORDER — SODIUM CHLORIDE 9 MG/ML
INJECTION, SOLUTION INTRAVENOUS CONTINUOUS
Status: DISCONTINUED | OUTPATIENT
Start: 2020-03-30 | End: 2020-03-30 | Stop reason: HOSPADM

## 2020-03-30 RX ORDER — ATORVASTATIN CALCIUM 40 MG/1
40 TABLET, FILM COATED ORAL NIGHTLY
Qty: 30 TABLET | Refills: 0 | Status: SHIPPED | OUTPATIENT
Start: 2020-03-30

## 2020-03-30 RX ADMIN — SODIUM CHLORIDE: 9 INJECTION, SOLUTION INTRAVENOUS at 11:45

## 2020-03-30 RX ADMIN — TRIAMTERENE AND HYDROCHLOROTHIAZIDE 1 CAPSULE: 25; 37.5 CAPSULE ORAL at 11:16

## 2020-03-30 RX ADMIN — FAMOTIDINE 20 MG: 10 INJECTION, SOLUTION INTRAVENOUS at 11:17

## 2020-03-30 RX ADMIN — TETRAKIS(2-METHOXYISOBUTYLISOCYANIDE)COPPER(I) TETRAFLUOROBORATE 30 MILLICURIE: 1 INJECTION, POWDER, LYOPHILIZED, FOR SOLUTION INTRAVENOUS at 11:16

## 2020-03-30 RX ADMIN — SODIUM CHLORIDE, PRESERVATIVE FREE 10 ML: 5 INJECTION INTRAVENOUS at 11:20

## 2020-03-30 RX ADMIN — TETRAKIS(2-METHOXYISOBUTYLISOCYANIDE)COPPER(I) TETRAFLUOROBORATE 10 MILLICURIE: 1 INJECTION, POWDER, LYOPHILIZED, FOR SOLUTION INTRAVENOUS at 11:16

## 2020-03-30 RX ADMIN — ASPIRIN 81 MG 81 MG: 81 TABLET ORAL at 11:16

## 2020-03-30 SDOH — HEALTH STABILITY: MENTAL HEALTH: HOW OFTEN DO YOU HAVE A DRINK CONTAINING ALCOHOL?: NOT ASKED

## 2020-03-30 NOTE — PROGRESS NOTES
4 Eyes Skin Assessment    Juan R Zamorano is being assessed upon: Admission    I agree that I, [facility], along with Lisa Layton (either 2 RN's or 1 LPN and 1 RN) have performed a thorough Head to Toe Skin Assessment on the patient. ALL assessment sites listed below have been assessed. Areas assessed by both nurses:     [x]   Head, Face, and Ears   [x]   Shoulders, Back, and Chest  [x]   Arms, Elbows, and Hands   [x]   Coccyx, Sacrum, and Ischium  [x]   Legs, Feet, and Heels    Does the Patient have Skin Breakdown?  No    Rolf Prevention initiated: Yes  Wound Care Orders initiated: NA    [facility] nurse consulted for Pressure Injury (Stage 3,4, Unstageable, DTI, NWPT, and Complex wounds) and New or Established Ostomies: NA        Primary Nurse eSignature: [facility], RN on 3/29/2020 at 10:26 PM      Co-Signer eSignature: Electronically signed by Sam Cadena on 3/29/20 at 11:48 PM CDT

## 2020-03-30 NOTE — H&P
an apartment, lives alone, has no pets, no steps    Family History   Problem Relation Age of Onset    Other Mother         ALS from age 76 years,  6 years later   [facility] COPD Father         was a smoker    Other Brother         murdered    No Known Problems Sister     No Known Problems Brother     No Known Problems Daughter     No Known Problems Daughter       Allergies:   No Known Allergies    Current Facility-Administered Medications   Medication Dose Route Frequency Provider Last Rate Last Dose    sodium chloride flush 0.9 % injection 10 mL  10 mL Intravenous 2 times per day Tiffany Gonzalez MD   10 mL at 20 235    sodium chloride flush 0.9 % injection 10 mL  10 mL Intravenous PRN Tiffany Gonzalez MD        acetaminophen (TYLENOL) tablet 650 mg  650 mg Oral Q6H PRN Tiffany Gonzalez MD        Or    acetaminophen (TYLENOL) suppository 650 mg  650 mg Rectal Q6H PRN Tiffany Gonzalez MD        famotidine (PEPCID) injection 20 mg  20 mg Intravenous BID Tiffany Gonzalez MD   20 mg at 20 235    atorvastatin (LIPITOR) tablet 40 mg  40 mg Oral Nightly Tiffany Gonzalez MD   40 mg at 20 235    aspirin chewable tablet 81 mg  81 mg Oral Daily Tiffany Gonzalez MD        nitroGLYCERIN (NITROSTAT) SL tablet 0.4 mg  0.4 mg Sublingual Q5 Min PRN Tiffany Gonzalez MD        heparin (porcine) injection 5,000 Units  5,000 Units Subcutaneous 3 times per day Tiffany Gonzalez MD   5,000 Units at 20 2355    triamterene-hydroCHLOROthiazide (DYAZIDE) 37.5-25 MG per capsule 1 capsule  1 capsule Oral SABRINA Churchill MD         Current Outpatient Medications   Medication Sig Dispense Refill    triamterene-hydroCHLOROthiazide (DYAZIDE) 37.5-25 MG per capsule Take 1 capsule by mouth every morning      lansoprazole (PREVACID) 15 MG delayed release capsule Take 15 mg by mouth daily           OBJECTIVE:    Vitals:    20 1811   BP: 134/78   Pulse: 78   Resp: 16   Temp: Psychiatric:         Mood and Affect: Mood normal.         Behavior: Behavior normal.         LABORATORY DATA:    CBC:  Recent Labs     03/29/20 1818   WBC 6.4   HGB 12.9*   HCT 40.2*          CMP:  Recent Labs     03/29/20 1818      K 3.5      CO2 25   BUN 18   CREATININE 1.3*   CALCIUM 9.1     Recent Labs     03/29/20 1818   AST 15   ALT 12   BILITOT 0.3   ALKPHOS 66       Cardiac Enzymes:   Recent Labs     03/29/20  1818 03/29/20  2226 03/30/20  0251   TROPONINI 0.01 0.02 0.01     TSH :  Results for Adelaida Maynard (MRN [id number]) as of 3/30/2020 04:23   Ref. Range 3/29/2020 18:18   TSH Reflex FT4 Latest Ref Range: 0.35 - 5.50 uIU/mL 3.47     A1C:   Results for Aedlaida Maynard (MRN [id number]) as of 3/30/2020 04:23   Ref. Range 3/29/2020 18:18   Glucose Latest Ref Range: 74 - 109 mg/dL 122 (H)   Hemoglobin A1C Latest Ref Range: 4.0 - 6.0 % 5.2       EKG:   No ST changes as per endorsement from day team hospitalist who received consult from ED      IMAGING:  Xr Chest Portable  Result Date: 3/29/2020  Opacities in the medial right lung base are favored to represent atelectasis. Otherwise, no evidence of acute cardiopulmonary process.  Signed by Dr Naima Meier on 3/29/2020 6:38 PM          ASESSMENTS & PLANS:    Patient Active Problem List   Diagnosis    Chest pain   \"admit\" to OBServation in Cardiac Unit under Hospitlaist team  Cardio consult in AM as per request/suggestion - is medically reasonable  Tele  Troponin Trending  High Intensity Statin  Got loading dose of ASA in ED, will need 81 daily from tomorrow for now  NG SL PRN CP  TTE in AM  AC not indicated as such as per lack of positive TnI or EKG changes - will use DVT PPx dosing however as per below    Continue Home Anti-HTN of:   [START ON 3/30/2020] triamterene-hydroCHLOROthiazide  1 capsule Oral QAM     Does NTO have PATRIA:  Cr 1.3 is at baseline    GERD:  Tx as per GI (PUD) PPx: (listed below)    Supoportive and Prophylactic

## 2020-03-30 NOTE — CONSULTS
rhythm,   Pulse Readings from Last 1 Encounters:   03/30/20 68    bpm,  without Acute changes    Troponin:  negative myocardial necrosis (  0.01); the creatinine is abnormal 1.5    CBC:   Recent Labs     03/29/20 1818 03/30/20  0251   WBC 6.4 7.1   HGB 12.9* 12.7*   HCT 40.2* 40.5*   MCV 87.6 89.8    279     BMP:   Recent Labs     03/29/20 1818 03/30/20  0251    143   K 3.5 3.8    104   CO2 25 26   BUN 18 18   CREATININE 1.3* 1.5*     Cardiac Enzymes:   Recent Labs     03/29/20 1818 03/29/20  2226 03/30/20  0251   TROPONINI 0.01 0.02 0.01     PT/INR: No results for input(s): PROTIME, INR in the last 72 hours. APTT: No results for input(s): APTT in the last 72 hours. Liver Profile:  Lab Results   Component Value Date    AST 15 03/30/2020    ALT 12 03/30/2020    BILITOT 0.3 03/30/2020    ALKPHOS 64 03/30/2020     Lab Results   Component Value Date    CHOL 174 03/30/2020    HDL 41 03/30/2020    TRIG 193 03/30/2020     TSH:  No results found for: TSH  UA: No results found for: NITRITE, COLORU, PHUR, LABCAST, WBCUA, RBCUA, MUCUS, TRICHOMONAS, YEAST, BACTERIA, CLARITYU, SPECGRAV, LEUKOCYTESUR, UROBILINOGEN, BILIRUBINUR, BLOODU, GLUCOSEU, AMORPHOUS          ALL THE CARDIOLOGY PROBLEMS ARE LISTED ABOVE; HOWEVER, THE FOLLOWING SPECIFIC CARDIAC PROBLEMS WERE ADDRESSED AND  TREATED DURING [facility]               Cardiac Specific Problem / Diagnosis  Discussion and Data Reviewed Diagnostic or Therapeutic Procedures Ordered Management Options Selected                   1. Presenting problem / symptom    Chest discomfort of ? etiology  are worsening   The chest discomfort is was not exertional and does not appear to represent myocardial ischemia.       Nonetheless, He has the following risk factors for the presence of coronary artery disease:    Risk Factor Yes / No / Unknown       Gender Male   Cigarette Use No   Family History of Cardiovascular Disease N/A   Diabetes

## 2020-03-30 NOTE — DISCHARGE SUMMARY
Kathy Waddell  :  1950  MRN:  [id number]    Admit date:  3/29/2020  Discharge date:  3/30/2020       Admitting Physician:  Monica Sweeney MD    Advance Directive: Full Code    Consults Made:   IP CONSULT TO CARDIOLOGY      Primary Care Physician:  Yuliet Kwong MD    Discharge Diagnoses: Active Problems:    * No active hospital problems. *  Resolved Problems:    Chest pain            Pertinent Labs:  CBC with DIFF:  Recent Labs     20  0251   WBC 6.4 7.1   RBC 4.59* 4.51*   HGB 12.9* 12.7*   HCT 40.2* 40.5*   MCV 87.6 89.8   MCH 28.1 28.2   MCHC 32.1* 31.4*   RDW 14.2 14.3    279   MPV 9.7 10.6   NEUTOPHILPCT 61.4 68.1*   LYMPHOPCT 22.4 16.0*   MONOPCT 11.0* 10.3*   EOSRELPCT 3.5 3.4   BASOPCT 0.8 1.1*   NEUTROABS 3.9 4.8   LYMPHSABS 1.4 1.1   MONOSABS 0.70 0.70   EOSABS 0.20 0.20   BASOSABS 0.10 0.10       CMP/BMP:  Recent Labs     20  0251    143   K 3.5 3.8    104   CO2 25 26   ANIONGAP 13 13   GLUCOSE 122* 108   BUN 18 18   CREATININE 1.3* 1.5*   LABGLOM 55* 46*   CALCIUM 9.1 8.9   PROT 7.0 6.0*   LABALBU 4.0 3.7   BILITOT 0.3 0.3   ALKPHOS 66 64   ALT 12 12   AST 15 15         CRP:  No results for input(s): CRP in the last 72 hours. Sed Rate:  No results for input(s): SEDRATE in the last 72 hours. HgBA1c:  No components found for: HGBA1C  FLP:    Lab Results   Component Value Date    TRIG 193 2020    HDL 41 2020    LDLCALC 94 2020     TSH:  No results found for: TSH  Troponin T:   Recent Labs     20  2226 20  0251   TROPONINI 0.01 0.02 0.01     Pro-BNP: No results for input(s): BNP in the last 72 hours. INR: No results for input(s): INR in the last 72 hours.   ABGs: No results found for: PHART, PO2ART, APH5KPO  UA:No results for input(s): NITRITE, COLORU, PHUR, LABCAST, [address] [facility], [address] [facility], MUCUS, TRICHOMONAS, YEAST, BACTERIA, CLARITYU, SPECGRAV, LEUKOCYTESUR, [address], BILIRUBINUR, BLOODU, Shay Monahananahy in the last 72 hours. Invalid input(s): Tevin Doe      Culture Results:    No results for input(s): CXSURG in the last 720 hours. Blood Culture Recent: No results for input(s): BC in the last 720 hours. Cultures:   No results for input(s): CULTURE in the last 72 hours. No results for input(s): BC, Dionicio Livingston in the last 72 hours. No results for input(s): CXSURG in the last 72 hours. Recent Labs     03/29/20  1818   MG 2.0  2.0     Recent Labs     03/29/20  1818 03/30/20  0251   AST 15 15   ALT 12 12   BILITOT 0.3 0.3   ALKPHOS 66 64           Significant Diagnostic Studies:   Xr Chest Portable    Result Date: 3/29/2020  EXAMINATION: XR CHEST PORTABLE 3/29/2020 6:36 PM HISTORY: Chest pain COMPARISON: 5/25/2010 FINDINGS: Heart and mediastinal contours appear normal. Minimal airspace opacities are noted in the medial right lung base. The lungs otherwise appear clear. There is no appreciable pneumothorax or pleural effusion. Pulmonary vasculature appears grossly normal.    Opacities in the medial right lung base are favored to represent atelectasis. Otherwise, no evidence of acute cardiopulmonary process. Signed by Dr Herminio Kirkland on 3/29/2020 6:38 PM    Nm Myocardial Spect Rest Exercise Or Rx    Result Date: 3/30/2020  [facility] Nuclear Stress Test Report Procedure date: March 30, 2020 Indications: chest pain Procedure: Stress was performed with injection of 0.4 mg Lexiscan. Vital signs and EKG were monitored. Technetium-99 sestamibi was injected in divided doses, approximately 10 mCi and 30 mCi respectively for rest and stress imaging. The patient was discharged in stable condition. Results: Patient had symptoms of dyspnea during infusion that resolved in recovery. Baseline EKG showed normal sinus rhythm without ST/T changes. During stress there were no significant EKG changes or rhythm changes. Baseline and peak blood pressures were 142/69, and 142/69 respectively.   Baseline and peak heart rates were 65 and  95 respectively. Lexiscan/Cardiolyte Nuclear Medicine Report Date of Procedure: 3/30/2020 The patient was injected with 35.0 millicuries (mCi) of Technetium (Tc99m). After an appropriate level of stress the patient was re-injected with 47.5 millicuries (mCi) of Technetium (Tc99m). Repeat gated images were then performed per standard protocol. Findings: 1. Analysis of the the stress and rest images reveals no obvious defects on stress or rest images. 2.  Analysis of the gated images reveals grossly normal left ventricular function with a calculated ejection fraction of 58 %. Impression: There is no obvious infarct or ischemia, with a calculated ejection fraction of 58 %. Suggest: Clinical correlation and consideration for medical management. Signed by Dr Edmond Kerr on 3/30/2020 2:00 PM    2D Echo - 03/30/2020  Summary   LV is normal in size with normal systolic function. LV ejection fraction   estimated at 55%. Normal diastolic function. RV is normal in size with normal systolic function. Normal left atrial size. Normal right atrial size. Aortic valve leaflets are not well-visualized (no short axis views) mild   aortic stenosis (mean gradient 14 mmHg) mild aortic regurgitation. Mitral valve is mildly thickened with normal leaflet mobility. No   stenosis. Mild to moderate mitral regurgitation. Trace tricuspid regurgitation. Signature   ----------------------------------------------------------------   Electronically signed by Kristine Menendez MD(Interpreting physician)   on 03/30/2020 11:21 AM   ----------------------------------------------------------------        Hospital Course:   Mr. Flaca Johnson, a 70-year-old male, history of HTN, presenting to [facility] ED (03/29/2020), on account of a 3-day history of intermittent, moderate midsternal burning chest discomfort, radiating to his neck. , without any reported associated symptoms.     Chest pain   · - Admitted to PCU to r/o ACS round, and reactive to light. Neck:      Musculoskeletal: Normal range of motion and neck supple. No neck rigidity or muscular tenderness. Vascular: No carotid bruit. Cardiovascular:      Rate and Rhythm: Normal rate and regular rhythm. Pulses: Normal pulses. Heart sounds: Normal heart sounds. No murmur. No friction rub. No gallop. Pulmonary:      Effort: Pulmonary effort is normal. No respiratory distress. Breath sounds: Normal breath sounds. No stridor. No wheezing, rhonchi or rales. Chest:      Chest wall: No tenderness. Abdominal:      General: Bowel sounds are normal. There is no distension. Palpations: Abdomen is soft. Tenderness: There is no abdominal tenderness. Musculoskeletal: Normal range of motion. General: No swelling, tenderness, deformity or signs of injury. Right lower leg: No edema. Left lower leg: No edema. Skin:     General: Skin is warm and dry. Capillary Refill: Capillary refill takes less than 2 seconds. Coloration: Skin is not jaundiced or pale. Findings: No bruising, erythema, lesion or rash. Neurological:      General: No focal deficit present. Mental Status: He is alert and oriented to person, place, and time. Cranial Nerves: No cranial nerve deficit. Sensory: No sensory deficit. Motor: No weakness. Coordination: Coordination normal.   Psychiatric:         Mood and Affect: Mood normal.         Behavior: Behavior normal.         Thought Content:  Thought content normal.         Judgment: Judgment normal.       Discharge Medications:       Aleksey Dempsey   Home Medication Instructions NOS:[id number]    Printed on:03/30/20 1043   Medication Information                      atorvastatin (LIPITOR) 40 MG tablet  Take 1 tablet by mouth nightly             lansoprazole (PREVACID) 15 MG delayed release capsule  Take 15 mg by mouth daily             triamterene-hydroCHLOROthiazide (DYAZIDE) 37.5-25 MG per capsule  Take 1 capsule by mouth every morning                 Discharge Instructions: Follow up with Moo Thompson MD in 7 days. Take medications as directed. Resume activity as tolerated. Diet: DIET CARDIAC;        DISCHARGE STATUS:    Condition: Good  Disposition: Patient is medically stable and will be discharged Home    Extended Emergency Contact Information  Primary Emergency Contact: Judd Justice, [address] Phone: [phone]  Relation: Other       Time Spent on discharge is more than 32 mins in the examination, evaluation, counseling and review of medications and discharge plan. Electronically signed by   Libia Love MD, MPH,   Internal Medicine Hospitalist   3/30/2020 5:06 PM      Thank you Moo Thompson MD for the opportunity to be involved in this patient's care. If you have any questions or concerns please feel free to contact me at [phone]        EMR Dragon/Transcription disclaimer:   Much of this encounter note is an electronic transcription/translation of spoken language to printed text.  The electronic translation of spoken language may permit erroneous, or at times, nonsensical words or phrases to be inadvertently transcribed; although attempts have made to review the note for such errors, some may still exist.

## 2020-03-31 LAB
EKG P AXIS: 52 DEGREES
EKG P-R INTERVAL: 178 MS
EKG Q-T INTERVAL: 362 MS
EKG QRS DURATION: 82 MS
EKG QTC CALCULATION (BAZETT): 386 MS
EKG T AXIS: 21 DEGREES
LV EF: 58 %
LVEF MODALITY: NORMAL

## 2020-03-31 PROCEDURE — 93010 ELECTROCARDIOGRAM REPORT: CPT | Performed by: INTERNAL MEDICINE

## 2020-04-15 LAB
ALBUMIN SERPL-MCNC: 3.8 G/DL (ref 3.5–5.2)
ALP BLD-CCNC: 93 U/L (ref 40–130)
ALT SERPL-CCNC: 16 U/L (ref 5–41)
ANION GAP SERPL CALCULATED.3IONS-SCNC: 15 MMOL/L (ref 7–19)
AST SERPL-CCNC: 14 U/L (ref 5–40)
BILIRUB SERPL-MCNC: 0.4 MG/DL (ref 0.2–1.2)
BUN BLDV-MCNC: 14 MG/DL (ref 8–23)
CALCIUM SERPL-MCNC: 9.5 MG/DL (ref 8.8–10.2)
CHLORIDE BLD-SCNC: 103 MMOL/L (ref 98–111)
CHOLESTEROL, TOTAL: 121 MG/DL (ref 160–199)
CO2: 26 MMOL/L (ref 22–29)
CREAT SERPL-MCNC: 1.4 MG/DL (ref 0.5–1.2)
GFR NON-AFRICAN AMERICAN: 50
GLUCOSE BLD-MCNC: 101 MG/DL (ref 74–109)
HDLC SERPL-MCNC: 34 MG/DL (ref 55–121)
LDL CHOLESTEROL CALCULATED: 66 MG/DL
POTASSIUM SERPL-SCNC: 4.5 MMOL/L (ref 3.5–5)
PROSTATE SPECIFIC ANTIGEN: 0.84 NG/ML (ref 0–4)
SODIUM BLD-SCNC: 144 MMOL/L (ref 136–145)
TOTAL PROTEIN: 7.8 G/DL (ref 6.6–8.7)
TRIGL SERPL-MCNC: 107 MG/DL (ref 0–149)

## 2021-12-22 ENCOUNTER — TRANSCRIBE ORDERS (OUTPATIENT)
Dept: ADMINISTRATIVE | Facility: HOSPITAL | Age: 71
End: 2021-12-22

## 2021-12-22 ENCOUNTER — HOSPITAL ENCOUNTER (OUTPATIENT)
Dept: GENERAL RADIOLOGY | Age: 71
Discharge: HOME | End: 2021-12-22
Payer: MEDICARE

## 2021-12-22 DIAGNOSIS — M79.671 RIGHT FOOT PAIN: ICD-10-CM

## 2021-12-22 DIAGNOSIS — R06.02 SHORTNESS OF BREATH: Primary | ICD-10-CM

## 2021-12-22 PROCEDURE — 73630 X-RAY EXAM OF FOOT: CPT

## 2021-12-30 ENCOUNTER — APPOINTMENT (OUTPATIENT)
Dept: PULMONOLOGY | Facility: HOSPITAL | Age: 71
End: 2021-12-30

## 2022-01-04 ENCOUNTER — APPOINTMENT (OUTPATIENT)
Dept: PULMONOLOGY | Facility: HOSPITAL | Age: 72
End: 2022-01-04

## 2022-01-18 ENCOUNTER — TRANSCRIBE ORDERS (OUTPATIENT)
Dept: ADMINISTRATIVE | Facility: HOSPITAL | Age: 72
End: 2022-01-18

## 2022-01-19 ENCOUNTER — TRANSCRIBE ORDERS (OUTPATIENT)
Dept: LAB | Facility: HOSPITAL | Age: 72
End: 2022-01-19

## 2022-01-19 DIAGNOSIS — Z01.818 PREOP TESTING: Primary | ICD-10-CM

## 2022-01-24 ENCOUNTER — LAB (OUTPATIENT)
Dept: LAB | Facility: HOSPITAL | Age: 72
End: 2022-01-24

## 2022-01-24 LAB — SARS-COV-2 ORF1AB RESP QL NAA+PROBE: NOT DETECTED

## 2022-01-24 PROCEDURE — U0004 COV-19 TEST NON-CDC HGH THRU: HCPCS | Performed by: FAMILY MEDICINE

## 2022-01-24 PROCEDURE — C9803 HOPD COVID-19 SPEC COLLECT: HCPCS | Performed by: FAMILY MEDICINE

## 2022-01-24 PROCEDURE — U0005 INFEC AGEN DETEC AMPLI PROBE: HCPCS | Performed by: FAMILY MEDICINE

## 2022-01-26 ENCOUNTER — HOSPITAL ENCOUNTER (OUTPATIENT)
Dept: PULMONOLOGY | Facility: HOSPITAL | Age: 72
Discharge: HOME | End: 2022-01-26
Admitting: FAMILY MEDICINE

## 2022-01-26 DIAGNOSIS — R06.02 SHORTNESS OF BREATH: ICD-10-CM

## 2022-01-26 PROCEDURE — 94727 GAS DIL/WSHOT DETER LNG VOL: CPT | Performed by: INTERNAL MEDICINE

## 2022-01-26 PROCEDURE — 94729 DIFFUSING CAPACITY: CPT

## 2022-01-26 PROCEDURE — 94727 GAS DIL/WSHOT DETER LNG VOL: CPT

## 2022-01-26 PROCEDURE — 94010 BREATHING CAPACITY TEST: CPT

## 2022-01-26 PROCEDURE — 94010 BREATHING CAPACITY TEST: CPT | Performed by: INTERNAL MEDICINE

## 2022-01-26 PROCEDURE — 94729 DIFFUSING CAPACITY: CPT | Performed by: INTERNAL MEDICINE

## 2022-04-06 ENCOUNTER — TRANSCRIBE ORDERS (OUTPATIENT)
Dept: ADMINISTRATIVE | Facility: HOSPITAL | Age: 72
End: 2022-04-06

## 2022-04-06 DIAGNOSIS — J43.9 PULMONARY EMPHYSEMA, UNSPECIFIED EMPHYSEMA TYPE: ICD-10-CM

## 2022-04-06 DIAGNOSIS — R94.2 NONSPECIFIC ABNORMAL RESULTS OF PULMONARY SYSTEM FUNCTION STUDY: ICD-10-CM

## 2022-04-06 DIAGNOSIS — G62.9 PERIPHERAL NERVE DISORDER: Primary | ICD-10-CM

## 2022-04-06 DIAGNOSIS — R42 DISEQUILIBRIUM: ICD-10-CM

## 2022-04-06 DIAGNOSIS — R20.2 PARESTHESIA OF LOWER LIMB: ICD-10-CM

## 2022-04-12 ENCOUNTER — HOSPITAL ENCOUNTER (OUTPATIENT)
Dept: CT IMAGING | Facility: HOSPITAL | Age: 72
Discharge: HOME | End: 2022-04-12

## 2022-04-12 ENCOUNTER — HOSPITAL ENCOUNTER (OUTPATIENT)
Dept: NEUROLOGY | Facility: HOSPITAL | Age: 72
Discharge: HOME | End: 2022-04-12

## 2022-04-12 DIAGNOSIS — R20.2 PARESTHESIA OF LOWER LIMB: ICD-10-CM

## 2022-04-12 DIAGNOSIS — R42 DISEQUILIBRIUM: ICD-10-CM

## 2022-04-12 PROCEDURE — 95912 NRV CNDJ TEST 11-12 STUDIES: CPT

## 2022-04-12 PROCEDURE — 95886 MUSC TEST DONE W/N TEST COMP: CPT

## 2022-04-12 PROCEDURE — 71250 CT THORAX DX C-: CPT

## 2022-05-02 ENCOUNTER — OFFICE VISIT (OUTPATIENT)
Dept: PULMONOLOGY | Facility: CLINIC | Age: 72
End: 2022-05-02

## 2022-05-02 VITALS
HEART RATE: 88 BPM | DIASTOLIC BLOOD PRESSURE: 70 MMHG | SYSTOLIC BLOOD PRESSURE: 122 MMHG | BODY MASS INDEX: 27.74 KG/M2 | HEIGHT: 68 IN | WEIGHT: 183 LBS | OXYGEN SATURATION: 98 %

## 2022-05-02 DIAGNOSIS — R06.02 SHORTNESS OF BREATH: Primary | ICD-10-CM

## 2022-05-02 DIAGNOSIS — I35.0 AORTIC VALVE STENOSIS, ETIOLOGY OF CARDIAC VALVE DISEASE UNSPECIFIED: ICD-10-CM

## 2022-05-02 DIAGNOSIS — R94.2 NONSPECIFIC ABNORMAL RESULTS OF PULMONARY SYSTEM FUNCTION STUDY: ICD-10-CM

## 2022-05-02 DIAGNOSIS — I34.0 MITRAL VALVE INSUFFICIENCY, UNSPECIFIED ETIOLOGY: ICD-10-CM

## 2022-05-02 DIAGNOSIS — J43.2 CENTRILOBULAR EMPHYSEMA: ICD-10-CM

## 2022-05-02 PROCEDURE — 99204 OFFICE O/P NEW MOD 45 MIN: CPT | Performed by: INTERNAL MEDICINE

## 2022-05-02 PROCEDURE — 94664 DEMO&/EVAL PT USE INHALER: CPT | Performed by: INTERNAL MEDICINE

## 2022-05-02 RX ORDER — BUDESONIDE AND FORMOTEROL FUMARATE DIHYDRATE 160; 4.5 UG/1; UG/1
1 AEROSOL RESPIRATORY (INHALATION) 2 TIMES DAILY
COMMUNITY
Start: 2022-02-09 | End: 2022-09-22 | Stop reason: ALTCHOICE

## 2022-05-02 RX ORDER — TIOTROPIUM BROMIDE INHALATION SPRAY 3.12 UG/1
2 SPRAY, METERED RESPIRATORY (INHALATION) DAILY
Qty: 2 G | Refills: 0 | COMMUNITY
Start: 2022-05-02 | End: 2022-05-09 | Stop reason: SDUPTHER

## 2022-05-02 RX ORDER — DULOXETIN HYDROCHLORIDE 30 MG/1
CAPSULE, DELAYED RELEASE ORAL
COMMUNITY
Start: 2022-04-25

## 2022-05-02 NOTE — PROCEDURES
Walking Oximetry  Performed by: Ishaan Fraire MD  Authorized by: Ishaan Fraire MD     Rest room air SAT %:  98  Exercise room air SAT %:  98

## 2022-05-09 DIAGNOSIS — R06.02 SHORTNESS OF BREATH: ICD-10-CM

## 2022-05-09 DIAGNOSIS — J43.2 CENTRILOBULAR EMPHYSEMA: ICD-10-CM

## 2022-05-09 RX ORDER — TIOTROPIUM BROMIDE INHALATION SPRAY 3.12 UG/1
2 SPRAY, METERED RESPIRATORY (INHALATION) DAILY
Qty: 4 G | Refills: 11 | Status: SHIPPED | OUTPATIENT
Start: 2022-05-09 | End: 2023-03-06 | Stop reason: SDUPTHER

## 2022-05-09 NOTE — TELEPHONE ENCOUNTER
He said the spiriva worked wonders for him on the first puff and is requesting a refill sent to [facility] in .   He has about 4 days left.      Please refill    Rx Refill Note  Requested Prescriptions     Pending Prescriptions Disp Refills   • tiotropium bromide monohydrate (Spiriva Respimat) 2.5 MCG/ACT aerosol solution inhaler 4 g 11     Sig: Inhale 2 puffs Daily.      Last office visit with prescribing clinician: 5/2/2022      Next office visit with prescribing clinician: 9/6/2022            Yessy Robles CMA  05/09/22, 10:41 CDT

## 2022-05-18 ENCOUNTER — DOCUMENTATION (OUTPATIENT)
Dept: CT IMAGING | Facility: HOSPITAL | Age: 72
End: 2022-05-18

## 2022-09-04 NOTE — PROGRESS NOTES
"Background:  Pt w dyspnea, abn dlco, emphysema.  AAT MS   Chief Complaint  Shortness of Breath    Subjective    History of Present Illness       Frederic Palacios presents to [facility].  He takes Spiriva every day.  He does not do much physically due to neuropathy.  He worked at [employer].  He has not been needing to do the rescue inhaler.  Dr. Tong has been working on some of his other medicines.  He is AAT MS; we discussed that today.  That does not warrant AAT replacement.     Tobacco Use: Low Risk    • Smoking Tobacco Use: Never Smoker   • Smokeless Tobacco Use: Never Used      Current Outpatient Medications   Medication Instructions   • ALPRAZolam (XANAX) 0.5 mg, Oral, 2 Times Daily PRN   • budesonide-formoterol (SYMBICORT) 160-4.5 MCG/ACT inhaler 1 puff, Inhalation, 2 Times Daily   • DULoxetine (CYMBALTA) 30 MG capsule TAKE 1 CAPSULE BY MOUTH EVERY DAY FOR 90 DAYS   • lansoprazole (PREVACID) 15 mg, Oral, Daily   • SILDENAFIL CITRATE PO 94 mg, Oral   • tiotropium bromide monohydrate (Spiriva Respimat) 2.5 MCG/ACT aerosol solution inhaler 2 puffs, Inhalation, Daily   • triamterene-hydrochlorothiazide (DYAZIDE) 37.5-25 MG per capsule 1 capsule, Oral, Every Morning      Objective     Vital Signs:   /74   Pulse 86   Ht 172.7 cm (68\")   Wt 83 kg (183 lb)   SpO2 98% Comment: RA  BMI 27.83 kg/m²   Physical Exam  Constitutional:       Appearance: Normal appearance. He is not ill-appearing or diaphoretic.   Eyes:      Extraocular Movements: Extraocular movements intact.   Pulmonary:      Effort: Pulmonary effort is normal. No respiratory distress.      Breath sounds: No wheezing, rhonchi or rales.   Skin:     Findings: No erythema or rash.   Neurological:      Mental Status: He is alert.        Result Review  Data Reviewed:{ Labs  Result Review  Imaging  Media :23}                       Assessment and Plan  {CC Problem List  Visit Diagnosis  ROS  " Review (Popup)  Health Maintenance  Quality  BestPractice  Medications  SmartSets  SnapShot Encounters  Media :23}   Diagnoses and all orders for this visit:    1. Carrier of alpha-1-antitrypsin deficiency (Primary)  Comments:  MS    2. Centrilobular emphysema (HCC)    3. Nonspecific abnormal results of pulmonary system function study    4. Need for Streptococcus pneumoniae and influenza vaccination  -     Pneumococcal Conjugate Vaccine 20-Valent All    continue Spiriva  We discussed the nature of aat carrier state  prevnar 20 today  Call for acute problems    Follow Up {Instructions Charge Capture  Follow-up Communications :23}   Return in about 6 months (around 3/6/2023) for spirometry.  Patient was given instructions and counseling regarding his condition or for health maintenance advice. Please see specific information pulled into the AVS if appropriate.    Electronically signed by Ishaan Fraire MD, 9/6/2022, 17:16 CDT

## 2022-09-06 ENCOUNTER — OFFICE VISIT (OUTPATIENT)
Dept: PULMONOLOGY | Facility: CLINIC | Age: 72
End: 2022-09-06

## 2022-09-06 VITALS
DIASTOLIC BLOOD PRESSURE: 74 MMHG | OXYGEN SATURATION: 98 % | HEART RATE: 86 BPM | WEIGHT: 183 LBS | SYSTOLIC BLOOD PRESSURE: 122 MMHG | HEIGHT: 68 IN | BODY MASS INDEX: 27.74 KG/M2

## 2022-09-06 DIAGNOSIS — J43.2 CENTRILOBULAR EMPHYSEMA: ICD-10-CM

## 2022-09-06 DIAGNOSIS — R94.2 NONSPECIFIC ABNORMAL RESULTS OF PULMONARY SYSTEM FUNCTION STUDY: ICD-10-CM

## 2022-09-06 DIAGNOSIS — Z14.8 CARRIER OF ALPHA-1-ANTITRYPSIN DEFICIENCY: Primary | ICD-10-CM

## 2022-09-06 DIAGNOSIS — Z23 NEED FOR STREPTOCOCCUS PNEUMONIAE AND INFLUENZA VACCINATION: ICD-10-CM

## 2022-09-06 PROCEDURE — G0009 ADMIN PNEUMOCOCCAL VACCINE: HCPCS | Performed by: INTERNAL MEDICINE

## 2022-09-06 PROCEDURE — 99213 OFFICE O/P EST LOW 20 MIN: CPT | Performed by: INTERNAL MEDICINE

## 2022-09-06 PROCEDURE — 90677 PCV20 VACCINE IM: CPT | Performed by: INTERNAL MEDICINE

## 2022-09-22 ENCOUNTER — OFFICE VISIT (OUTPATIENT)
Dept: NEUROLOGY | Facility: CLINIC | Age: 72
End: 2022-09-22

## 2022-09-22 VITALS
SYSTOLIC BLOOD PRESSURE: 140 MMHG | HEART RATE: 78 BPM | DIASTOLIC BLOOD PRESSURE: 80 MMHG | RESPIRATION RATE: 18 BRPM | BODY MASS INDEX: 27.13 KG/M2 | WEIGHT: 179 LBS | HEIGHT: 68 IN

## 2022-09-22 DIAGNOSIS — M79.2 NEUROPATHIC PAIN: Primary | ICD-10-CM

## 2022-09-22 DIAGNOSIS — G62.9 NEUROPATHY: ICD-10-CM

## 2022-09-22 DIAGNOSIS — R20.2 PARESTHESIA: ICD-10-CM

## 2022-09-22 PROCEDURE — 99204 OFFICE O/P NEW MOD 45 MIN: CPT | Performed by: PSYCHIATRY & NEUROLOGY

## 2022-09-22 RX ORDER — PREGABALIN 50 MG/1
50 CAPSULE ORAL 3 TIMES DAILY
Qty: 90 CAPSULE | Refills: 2 | Status: SHIPPED | OUTPATIENT
Start: 2022-09-22 | End: 2022-12-22

## 2022-09-23 ENCOUNTER — LAB (OUTPATIENT)
Dept: LAB | Facility: HOSPITAL | Age: 72
End: 2022-09-23

## 2022-09-23 DIAGNOSIS — M79.2 NEUROPATHIC PAIN: ICD-10-CM

## 2022-09-23 LAB — FOLATE SERPL-MCNC: 14.1 NG/ML (ref 4.78–24.2)

## 2022-09-23 PROCEDURE — 84207 ASSAY OF VITAMIN B-6: CPT

## 2022-09-23 PROCEDURE — 82746 ASSAY OF FOLIC ACID SERUM: CPT

## 2022-09-23 PROCEDURE — 36415 COLL VENOUS BLD VENIPUNCTURE: CPT

## 2022-09-23 PROCEDURE — 84165 PROTEIN E-PHORESIS SERUM: CPT

## 2022-09-23 PROCEDURE — 84155 ASSAY OF PROTEIN SERUM: CPT

## 2022-09-23 PROCEDURE — 82525 ASSAY OF COPPER: CPT

## 2022-09-26 LAB
ALBUMIN SERPL ELPH-MCNC: 3.3 G/DL (ref 2.9–4.4)
ALBUMIN/GLOB SERPL: 1 {RATIO} (ref 0.7–1.7)
ALPHA1 GLOB SERPL ELPH-MCNC: 0.2 G/DL (ref 0–0.4)
ALPHA2 GLOB SERPL ELPH-MCNC: 0.8 G/DL (ref 0.4–1)
B-GLOBULIN SERPL ELPH-MCNC: 1 G/DL (ref 0.7–1.3)
COPPER SERPL-MCNC: 88 UG/DL (ref 69–132)
GAMMA GLOB SERPL ELPH-MCNC: 1.4 G/DL (ref 0.4–1.8)
GLOBULIN SER CALC-MCNC: 3.4 G/DL (ref 2.2–3.9)
LABORATORY COMMENT REPORT: NORMAL
M PROTEIN SERPL ELPH-MCNC: NORMAL G/DL
PROT PATTERN SERPL ELPH-IMP: NORMAL
PROT SERPL-MCNC: 6.7 G/DL (ref 6–8.5)

## 2022-09-28 LAB — VIT B6 SERPL-MCNC: 10.1 UG/L (ref 3.4–65.2)

## 2022-10-19 ENCOUNTER — HOSPITAL ENCOUNTER (OUTPATIENT)
Dept: NEUROLOGY | Facility: HOSPITAL | Age: 72
Discharge: HOME | End: 2022-10-19
Admitting: PSYCHIATRY & NEUROLOGY

## 2022-10-19 DIAGNOSIS — R20.2 PARESTHESIA: ICD-10-CM

## 2022-10-19 DIAGNOSIS — M79.2 NEUROPATHIC PAIN: ICD-10-CM

## 2022-10-19 PROCEDURE — 95912 NRV CNDJ TEST 11-12 STUDIES: CPT | Performed by: PSYCHIATRY & NEUROLOGY

## 2022-10-19 PROCEDURE — 95885 MUSC TST DONE W/NERV TST LIM: CPT | Performed by: PSYCHIATRY & NEUROLOGY

## 2022-10-19 PROCEDURE — 95912 NRV CNDJ TEST 11-12 STUDIES: CPT

## 2022-10-19 PROCEDURE — 95885 MUSC TST DONE W/NERV TST LIM: CPT

## 2022-11-02 ENCOUNTER — TREATMENT (OUTPATIENT)
Dept: PHYSICAL THERAPY | Facility: CLINIC | Age: 72
End: 2022-11-02

## 2022-11-02 DIAGNOSIS — R26.9 ABNORMAL GAIT: ICD-10-CM

## 2022-11-02 DIAGNOSIS — G62.2 POLYNEUROPATHY DUE TO OTHER TOXIC AGENTS: Primary | ICD-10-CM

## 2022-11-02 PROCEDURE — 97535 SELF CARE MNGMENT TRAINING: CPT | Performed by: PHYSICAL THERAPIST

## 2022-11-02 PROCEDURE — 97162 PT EVAL MOD COMPLEX 30 MIN: CPT | Performed by: PHYSICAL THERAPIST

## 2022-11-02 NOTE — PROGRESS NOTES
Physical Therapy Initial Evaluation and Plan of Care  [address] [place] [address]    Patient: Frederic Palacios   : 1950  Referring practitioner: Mai Perez MD  Date of Initial Visit: 2022  Today's Date: 2022  Patient seen for 1 sessions    Visit Diagnoses:    ICD-10-CM ICD-9-CM   1. 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 Abnormal gait  R26.9 781.2     Past Medical History:   Diagnosis Date   • History of radiation therapy 2017    7560 cGy to prostate   • Hypertension    • Prostate cancer (HCC)      Past Surgical History:   Procedure Laterality Date   • COLONOSCOPY  2010    normal   • COLONOSCOPY  2004    small polyp at 25 cm,otherwise normal colonoscopy   • COLONOSCOPY N/A 2018    Procedure: COLONOSCOPY WITH ANESTHESIA;  Surgeon: Abhijeet Alonso DO;  Location: [facility] ENDOSCOPY;  Service:    • ENDOSCOPY  2004    gastritis   • PROSTATE BIOPSY           SUBJECTIVE     Subjective   The patient living arrangement includes home independently. Their home accessibility includes 1-story house/ trailer. Their home assistive devices and adaptive equipment includes Cane, Type: Single Point Cane.  Outcome Measure:     PT G-Codes  Outcome Measure Options: Tinetti  Tinetti Total Score: 14    OBJECTIVE     Objective          Strength/Myotome Testing     Left Hip   Planes of Motion   Flexion: 3+    Right Hip   Planes of Motion   Flexion: 4    Left Knee   Flexion: 4  Extension: 5    Right Knee   Flexion: 5  Extension: 5    Left Ankle/Foot   Dorsiflexion: 3+    Right Ankle/Foot   Dorsiflexion: 4+    Ambulation     Comments   He walks with low guard and stays close to walls or anything to touch or lean on to make sure he keeps his balance.       Vision assessment: normal.    Cognitive status: oriented to Person, Place, Time and Situation     Sensation: abnormal pain sensation: summation  hypersensitivity present, location: B hands and arms, B feet and lower legs.      Midline orientation: Midline and pushes posterior in standing he tends to bear weight on heels and loses his balance posteriorly more often.    Fine Motor:   Not formally tested With neuropathy in hands, I am sure he has some decreased dexterity.        Cerebellar exam: difficulty with tandem and cannot stand on each foot independently    BALANCE TESTING  Romberg                           lost balance after 1 secs eyes open  Sharpened Romberg       lost balance after 5- with R foot in back secs eyes open  Single Leg Stance Right   unable to perform test  Single Leg Stance Left      unable to perform test    Therapy Education/Self Care [id number]   Education offered today Educated about balance systems. POC   Medbride Code    Ongoing HEP   Told him to try compression socks and some good gloves to see if it helps reduce the neuropathic sensations at all.    Timed Minutes 10       Total Timed Treatment:     10   mins  Total Time of Visit:            60   mins    ASSESSMENT/PLAN     GOALS:  Goals                                          Progress Note due by 12/1/22                                                      Recert due by 1/30/23   STG by: 6 weeks Comments Date Status   No falls or near falls reported.       He will be able to make turns while walking without LOB. AD if necessary                  LTG by: 12 weeks      He will be able to navigate changes in walking surfaces safely.      He will be able to maintain sharpened romberg position for 10 seconds       He will be able to balance on either leg for 10 sec      Independent with comprehensive HEP for balance and strength maintenance.                     Assessment & Plan     Assessment  Impairments: abnormal gait, impaired balance and safety issue  Functional Limitations: walking and standing  Assessment details: Mr. 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 He does seem to have a pretty good understanding of the condition and that his feet now aren't telling his brain where they are. He tends to lose his balance backwards and has begun trying to compensate by consciously leaning forward a little bit. His R leg is stronger and more dominant than the L. He is a talker and at times hard to keep on task. He would benefit from balance and gait training to try to reduce his risk of falls. We can also work on some strengthening to get the L leg more involved with his mobility. He may just need a cane or walking stick for safety. Thank you for the referral!    Barriers to therapy: nature of neuropathy  Prognosis: fair    Plan  Therapy options: will be seen for skilled therapy services  Planned modality interventions: low level laser therapy, dry needling and TENS  Planned therapy interventions: balance/weight-bearing training, gait training, home exercise program, transfer training, therapeutic activities and neuromuscular re-education  Frequency: 1x week  Duration in weeks: 12  Treatment plan discussed with: patient  Plan details: PT to see for balance training, strengthening and progress HEP for the same.         SIGNATURE: Glo Mayo, PT, DPT, TAMI-PARESH SHELTON License #: [id number]  Electronically Signed on 11/2/2022    Initial Certification  Certification Period: 11/2/2022 through 1/30/2023  I certify that the therapy services are furnished while this patient is under my care.  The services outlined above are required by this patient, and will be reviewed every 90 days.     PHYSICIAN: Mai Perez MD (NPI: [id number])    Signature: _______________________________________DATE: _________    Please sign and return via fax to [phone].   Thank you so much for letting us work with Frederic. I appreciate your letting us work with your patients. If you have any questions or concerns, please don't hesitate to  contact me.          [address] [place] [address]  [phone]

## 2022-12-22 ENCOUNTER — OFFICE VISIT (OUTPATIENT)
Dept: NEUROLOGY | Facility: CLINIC | Age: 72
End: 2022-12-22

## 2022-12-22 VITALS
SYSTOLIC BLOOD PRESSURE: 128 MMHG | WEIGHT: 185.4 LBS | DIASTOLIC BLOOD PRESSURE: 64 MMHG | HEIGHT: 68 IN | OXYGEN SATURATION: 97 % | BODY MASS INDEX: 28.1 KG/M2 | HEART RATE: 75 BPM

## 2022-12-22 DIAGNOSIS — M79.2 NEUROPATHIC PAIN: ICD-10-CM

## 2022-12-22 PROCEDURE — 99214 OFFICE O/P EST MOD 30 MIN: CPT | Performed by: PSYCHIATRY & NEUROLOGY

## 2022-12-22 RX ORDER — PREGABALIN 100 MG/1
200 CAPSULE ORAL 3 TIMES DAILY
Qty: 180 CAPSULE | Refills: 2 | Status: SHIPPED | OUTPATIENT
Start: 2022-12-22 | End: 2023-03-06

## 2022-12-22 NOTE — PROGRESS NOTES
"  Subjective        Frederic Palacios presents to [facility]    History of Present Illness  72-year-old male who for follow-up.  He does not think he had any benefit on Lyrica.  He did increase to 2 pills per dose and also did not notice any benefit.  He complains of significant pain that is stinging and making him miserable.  He tells me he is interested in cannabis for this especially considering the recent executive order.      Past Medical History:   Diagnosis Date   • History of radiation therapy 01/03/2017    7560 cGy to prostate   • Hypertension    • Prostate cancer (HCC)           Current Outpatient Medications:   •  DULoxetine (CYMBALTA) 30 MG capsule, Take 2 caps in the am, Disp: , Rfl:   •  lansoprazole (PREVACID) 15 MG capsule, Take 15 mg by mouth Daily., Disp: , Rfl:   •  pregabalin (Lyrica) 50 MG capsule, Take 1 capsule by mouth 3 (Three) Times a Day., Disp: 90 capsule, Rfl: 2  •  tiotropium bromide monohydrate (Spiriva Respimat) 2.5 MCG/ACT aerosol solution inhaler, Inhale 2 puffs Daily., Disp: 4 g, Rfl: 11  •  triamterene-hydrochlorothiazide (DYAZIDE) 37.5-25 MG per capsule, Take 1 capsule by mouth Every Morning., Disp: , Rfl:        Objective   Vital Signs:   /64   Pulse 75   Ht 172.7 cm (68\")   Wt 84.1 kg (185 lb 6.4 oz)   SpO2 97%   BMI 28.19 kg/m²     Physical Exam  Constitutional:       General: He is awake.   Eyes:      Extraocular Movements: Extraocular movements intact.   Neurological:      Mental Status: He is alert.   Psychiatric:         Speech: Speech normal.        Neurological Exam  Mental Status  Awake and alert. Speech is normal. Language is fluent with no aphasia.    Cranial Nerves  CN III, IV, VI: Extraocular movements intact bilaterally.  CN VII: Full and symmetric facial movement.    Gait  Casual gait is normal including stance, stride, and arm swing.      Result Review :                     Assessment and Plan   72-year-old with progressive " painful neuropathy.  EMG confirmed a sensorimotor polyneuropathy.  Lab work-up has been normal.  He did not have benefit on gabapentin or duloxetine.  Started Lyrica and does not think he has had a benefit either.  Currently taking 100 mg 3 times daily.  Will increase.  He also would like to explore medical cannabis.  I also offered referral to pain management which she declined at this time.    Plan:    1.  Increase Lyrica to 200 mg 3 times daily.  2.  Follow-up 6 months.        Follow Up   No follow-ups on file.  Patient was given instructions and counseling regarding his condition or for health maintenance advice. Please see specific information pulled into the AVS if appropriate.

## 2023-01-04 ENCOUNTER — TELEPHONE (OUTPATIENT)
Dept: NEUROLOGY | Facility: CLINIC | Age: 73
End: 2023-01-04
Payer: MEDICARE

## 2023-01-04 NOTE — TELEPHONE ENCOUNTER
Patient came in today to see if Dr. Perez wrote him a letter so that if he got pulled over it would be legal for him to have cannibus in his vehicle. I told patient that Dr. Perez and her nurse were not in the office today but I would give them the message to look into for him.

## 2023-01-09 NOTE — TELEPHONE ENCOUNTER
Spoke with the patient and advised that  has written a letter for him to  in the office. Frederic verbalizes understanding.

## 2023-02-08 ENCOUNTER — TRANSCRIBE ORDERS (OUTPATIENT)
Dept: ADMINISTRATIVE | Facility: HOSPITAL | Age: 73
End: 2023-02-08
Payer: MEDICARE

## 2023-02-08 DIAGNOSIS — G45.8 SUBCLAVIAN STEAL SYNDROME: Primary | ICD-10-CM

## 2023-02-23 ENCOUNTER — HOSPITAL ENCOUNTER (OUTPATIENT)
Dept: ULTRASOUND IMAGING | Facility: HOSPITAL | Age: 73
Discharge: HOME | End: 2023-02-23
Admitting: FAMILY MEDICINE
Payer: MEDICARE

## 2023-02-23 DIAGNOSIS — G45.8 SUBCLAVIAN STEAL SYNDROME: ICD-10-CM

## 2023-02-23 PROCEDURE — 93923 UPR/LXTR ART STDY 3+ LVLS: CPT

## 2023-02-23 PROCEDURE — 93923 UPR/LXTR ART STDY 3+ LVLS: CPT | Performed by: SURGERY

## 2023-03-05 NOTE — PROGRESS NOTES
"Background:  Pt w dyspnea, abn dlco, emphysema.  AAT MS   Chief Complaint  Shortness of Breath    Subjective    History of Present Illness     Frederic Palacios is here for follow up with [facility].  History of Present Illness  He has no new complaints.  He feels that the Spiriva is very helpful.  No exacerbations, no worsening dyspnea.     Tobacco Use: Low Risk    • Smoking Tobacco Use: Never   • Smokeless Tobacco Use: Never   • Passive Exposure: Not on file      Current Outpatient Medications   Medication Instructions   • albuterol sulfate  (90 Base) MCG/ACT inhaler 2 puffs, Inhalation, Every 4 Hours PRN   • DULoxetine (CYMBALTA) 30 MG capsule Take 2 caps in the am   • gabapentin (NEURONTIN) 600 mg, Oral, 3 Times Daily, for 30 days.   • lansoprazole (PREVACID) 15 mg, Oral, Daily   • tiotropium bromide monohydrate (Spiriva Respimat) 2.5 MCG/ACT aerosol solution inhaler 2 puffs, Inhalation, Daily   • triamterene-hydrochlorothiazide (DYAZIDE) 37.5-25 MG per capsule 1 capsule, Oral, Every Morning      Objective     Vital Signs:   /82   Pulse 90   Ht 170.2 cm (67\")   Wt 81.2 kg (179 lb)   SpO2 98% Comment: RA  BMI 28.04 kg/m²   Physical Exam  Constitutional:       Appearance: Normal appearance. He is not ill-appearing or diaphoretic.   Eyes:      Extraocular Movements: Extraocular movements intact.   Pulmonary:      Effort: Pulmonary effort is normal.      Breath sounds: No wheezing or rhonchi.   Neurological:      Mental Status: He is alert.        Result Review  Data Reviewed:    US Arterial Doppler Upper Extremity Bilateral    Result Date: 2/23/2023  Impression: Impression: 1.  No significant arterial insufficiency of the right upper extremity at rest. 2.  No significant arterial insufficiency of the left upper extremity at rest.     This report was finalized on 02/23/2023 16:41 by Dr. Simon Swartz MD.  CT Chest Hi Resolution Diagnostic " (04/12/2022 09:45)  The central airways are clear. No significant bronchiectasis. No  significant peripheral interstitial reticulation. No honeycombing. No  tree-in-bud nodularity or groundglass opacities. Mild atelectasis in  both lung bases. Mild centrilobular emphysema. No areas of  consolidation. 2 mm LEFT lower lobe pulmonary nodule on image 66. No  pleural effusion or pleural nodularity.     No enlarged thoracic lymph nodes. Small amount of fluid in the superior  pericardial recess noted. Main pulmonary artery is nondilated. Thoracic  aorta is nonaneurysmal. No pericardial effusion.  Small subcentimeter thyroid nodules. No acute chest wall soft tissue  abnormality. 3 cm LEFT liver cyst noted. No acute osseous finding.  IMPRESSION:  1.  No evidence of interstitial fibrotic lung disease. Mild  centrilobular emphysema.   2.  2 mm LEFT lower lobe pulmonary nodule. Consider a follow-up chest CT  in one year based on Fleischner criteria.  This report was finalized on 04/12/2022 10:49 by Dr. Bishop Pugh MD.    PFT Values        Some values may be hidden. Unless noted otherwise, only the newest values recorded on each date are displayed.         Old Values PFT Results 3/6/23   No data to display.      Pre Drug PFT Results 3/6/23   FVC 96   FEV1 98   FEF 25-75% 110   FEV1/FVC 77      Post Drug PFT Results 3/6/23   No data to display.      Other Tests PFT Results 3/6/23   No data to display.                      Assessment and Plan    Diagnoses and all orders for this visit:    1. Centrilobular emphysema (HCC) (Primary)  -     Pulmonary Function Test  -     albuterol sulfate  (90 Base) MCG/ACT inhaler; Inhale 2 puffs Every 4 (Four) Hours As Needed for Wheezing.  Dispense: 18 g; Refill: 11  -     tiotropium bromide monohydrate (Spiriva Respimat) 2.5 MCG/ACT aerosol solution inhaler; Inhale 2 puffs Daily.  Dispense: 4 g; Refill: 11    2. Carrier of alpha-1-antitrypsin deficiency    3. Nonspecific abnormal  results of pulmonary system function study    4. Aortic valve stenosis, etiology of cardiac valve disease unspecified    5. Mitral valve insufficiency, unspecified etiology    6. Shortness of breath    7. Left lower lobe pulmonary nodule  -     CT Chest Without Contrast Diagnostic; Future    continue the same; no changes  Call as needed for acute symptoms  Ct from last year was reviewed after visit, showing 2 mm lung nodule.  As a never smoker he is not at risk for lung cancer, but nor should he be at particular risk for emphysema.  Will plan follow up ct scan.    Follow Up   Return in about 6 months (around 9/6/2023).  Patient was given instructions and counseling regarding his condition or for health maintenance advice. Please see specific information pulled into the AVS if appropriate.    Electronically signed by Ishaan Fraire MD, 3/6/2023, 21:50 CST

## 2023-03-06 ENCOUNTER — OFFICE VISIT (OUTPATIENT)
Dept: PULMONOLOGY | Facility: CLINIC | Age: 73
End: 2023-03-06
Payer: OTHER MISCELLANEOUS

## 2023-03-06 VITALS
DIASTOLIC BLOOD PRESSURE: 82 MMHG | HEIGHT: 67 IN | BODY MASS INDEX: 28.09 KG/M2 | OXYGEN SATURATION: 98 % | HEART RATE: 90 BPM | SYSTOLIC BLOOD PRESSURE: 136 MMHG | WEIGHT: 179 LBS

## 2023-03-06 DIAGNOSIS — R06.02 SHORTNESS OF BREATH: ICD-10-CM

## 2023-03-06 DIAGNOSIS — I34.0 MITRAL VALVE INSUFFICIENCY, UNSPECIFIED ETIOLOGY: ICD-10-CM

## 2023-03-06 DIAGNOSIS — J43.2 CENTRILOBULAR EMPHYSEMA: Primary | ICD-10-CM

## 2023-03-06 DIAGNOSIS — I35.0 AORTIC VALVE STENOSIS, ETIOLOGY OF CARDIAC VALVE DISEASE UNSPECIFIED: ICD-10-CM

## 2023-03-06 DIAGNOSIS — R94.2 NONSPECIFIC ABNORMAL RESULTS OF PULMONARY SYSTEM FUNCTION STUDY: ICD-10-CM

## 2023-03-06 DIAGNOSIS — R91.1 LEFT LOWER LOBE PULMONARY NODULE: ICD-10-CM

## 2023-03-06 DIAGNOSIS — Z14.8 CARRIER OF ALPHA-1-ANTITRYPSIN DEFICIENCY: ICD-10-CM

## 2023-03-06 PROCEDURE — 99214 OFFICE O/P EST MOD 30 MIN: CPT | Performed by: INTERNAL MEDICINE

## 2023-03-06 PROCEDURE — 94010 BREATHING CAPACITY TEST: CPT | Performed by: INTERNAL MEDICINE

## 2023-03-06 RX ORDER — ALBUTEROL SULFATE 90 UG/1
2 AEROSOL, METERED RESPIRATORY (INHALATION) EVERY 4 HOURS PRN
COMMUNITY
End: 2023-03-06 | Stop reason: SDUPTHER

## 2023-03-06 RX ORDER — ALBUTEROL SULFATE 90 UG/1
2 AEROSOL, METERED RESPIRATORY (INHALATION) EVERY 4 HOURS PRN
Qty: 18 G | Refills: 11 | Status: SHIPPED | OUTPATIENT
Start: 2023-03-06

## 2023-03-06 RX ORDER — GABAPENTIN 600 MG/1
600 TABLET ORAL 3 TIMES DAILY
COMMUNITY
Start: 2023-02-13

## 2023-03-06 RX ORDER — TIOTROPIUM BROMIDE INHALATION SPRAY 3.12 UG/1
2 SPRAY, METERED RESPIRATORY (INHALATION) DAILY
Qty: 4 G | Refills: 11 | Status: SHIPPED | OUTPATIENT
Start: 2023-03-06

## 2023-03-06 NOTE — PROCEDURES
Pulmonary Function Test  Performed by: Yessy Domínguez, RRT  Authorized by: Ishaan Fraire MD      Pre Drug % Predicted    FVC: 96%   FEV1: 98%   FEF 25-75%: 110%   FEV1/FVC: 77%    Interpretation   Spirometry   Spirometry shows normal results. midflow is normal.

## 2023-03-08 ENCOUNTER — TELEPHONE (OUTPATIENT)
Dept: PULMONOLOGY | Facility: CLINIC | Age: 73
End: 2023-03-08
Payer: MEDICARE

## 2023-03-08 NOTE — TELEPHONE ENCOUNTER
----- Message from Ishaan Fraire MD sent at 3/6/2023  9:34 PM CST -----  Let patient know I have been reviewing his xrays and on the ct scan there was a very small nodule (spot), that the radiologist recommended rechecking in about April 2023.  Guidelines for whether this is needed are unclear.  His nonsmoking status would make something like cancer very unlikely so ordinarily we might not repeat a scan because of that.  However he also has emphysema so that may make that finding a little more significant.  I think it would be best to go ahead and get the ct.  Ill go ahead and put the order in.  If he decides he doesn't want to do it, then we can cancel.

## 2023-04-12 ENCOUNTER — HOSPITAL ENCOUNTER (OUTPATIENT)
Dept: CT IMAGING | Facility: HOSPITAL | Age: 73
Discharge: HOME | End: 2023-04-12
Admitting: INTERNAL MEDICINE
Payer: OTHER MISCELLANEOUS

## 2023-04-12 DIAGNOSIS — R91.1 LEFT LOWER LOBE PULMONARY NODULE: ICD-10-CM

## 2023-04-12 PROCEDURE — 71250 CT THORAX DX C-: CPT

## 2023-04-12 NOTE — PROGRESS NOTES
Let pt know this looked ok.  The only thing of concern at all is a new 5 mm v(very small) nodule now in the left lower lobe, doubtful significance, but we might want to repeat the scan next year.  Nothing else to do for now.  Keep follow up as planned

## 2023-06-22 PROBLEM — R12 HEARTBURN: Status: ACTIVE | Noted: 2023-06-22

## 2023-06-22 PROBLEM — I10 HYPERTENSION: Chronic | Status: ACTIVE | Noted: 2023-06-22

## 2023-08-01 ENCOUNTER — TRANSCRIBE ORDERS (OUTPATIENT)
Dept: ADMINISTRATIVE | Facility: HOSPITAL | Age: 73
End: 2023-08-01
Payer: MEDICARE

## 2023-08-01 ENCOUNTER — HOSPITAL ENCOUNTER (OUTPATIENT)
Dept: GENERAL RADIOLOGY | Facility: HOSPITAL | Age: 73
Discharge: HOME | End: 2023-08-01
Admitting: NURSE PRACTITIONER
Payer: MEDICARE

## 2023-08-01 DIAGNOSIS — M19.012 PRIMARY OSTEOARTHRITIS OF LEFT SHOULDER: ICD-10-CM

## 2023-08-01 DIAGNOSIS — M19.012 PRIMARY OSTEOARTHRITIS OF LEFT SHOULDER: Primary | ICD-10-CM

## 2023-08-01 PROCEDURE — 73030 X-RAY EXAM OF SHOULDER: CPT

## 2023-09-06 ENCOUNTER — OFFICE VISIT (OUTPATIENT)
Dept: PULMONOLOGY | Facility: CLINIC | Age: 73
End: 2023-09-06
Payer: OTHER MISCELLANEOUS

## 2023-09-06 VITALS
WEIGHT: 183 LBS | OXYGEN SATURATION: 100 % | SYSTOLIC BLOOD PRESSURE: 124 MMHG | DIASTOLIC BLOOD PRESSURE: 80 MMHG | BODY MASS INDEX: 27.74 KG/M2 | HEART RATE: 75 BPM | HEIGHT: 68 IN

## 2023-09-06 DIAGNOSIS — J43.2 CENTRILOBULAR EMPHYSEMA: Primary | ICD-10-CM

## 2023-09-06 DIAGNOSIS — Z14.8 CARRIER OF ALPHA-1-ANTITRYPSIN DEFICIENCY: ICD-10-CM

## 2023-09-06 DIAGNOSIS — R91.1 LEFT LOWER LOBE PULMONARY NODULE: ICD-10-CM

## 2023-09-06 DIAGNOSIS — R94.2 NONSPECIFIC ABNORMAL RESULTS OF PULMONARY SYSTEM FUNCTION STUDY: ICD-10-CM

## 2023-09-06 PROCEDURE — 99214 OFFICE O/P EST MOD 30 MIN: CPT | Performed by: INTERNAL MEDICINE

## 2023-09-06 NOTE — PROGRESS NOTES
"Background:  Pt w dyspnea, abn dlco, emphysema.  AAT MS   Chief Complaint  Centrilobular emphysema    Subjective    History of Present Illness     Frederic Palacios is here for follow up with [facility].  History of Present Illness  He needs the inhaler regularly with exertion.  He uses Spiriva every day.  He denies dyspnea at rest, but exertional dyspnea is significant.  Fortunately he has not had to curtail activities on account of dyspnea.   Tobacco Use: Low Risk     Smoking Tobacco Use: Never    Smokeless Tobacco Use: Never    Passive Exposure: Not on file      Current Outpatient Medications   Medication Instructions    albuterol sulfate  (90 Base) MCG/ACT inhaler 2 puffs, Inhalation, Every 4 Hours PRN    gabapentin (NEURONTIN) 800 mg, Oral, 3 Times Daily, for 30 days.    losartan (COZAAR) 50 MG tablet 1 tablet, Oral, Daily    omeprazole (PRILOSEC) 20 mg, Oral, As Needed    tiotropium bromide monohydrate (Spiriva Respimat) 2.5 MCG/ACT aerosol solution inhaler 2 puffs, Inhalation, Daily    triamterene-hydrochlorothiazide (DYAZIDE) 37.5-25 MG per capsule 1 capsule, Oral, Every Morning      Objective     Vital Signs:   /80   Pulse 75   Ht 172.7 cm (68\")   Wt 83 kg (183 lb)   SpO2 100% Comment: RA  BMI 27.83 kg/m²   Physical Exam  Constitutional:       Appearance: Normal appearance. He is not ill-appearing or diaphoretic.   Eyes:      Extraocular Movements: Extraocular movements intact.   Pulmonary:      Effort: Pulmonary effort is normal. No respiratory distress.      Breath sounds: No wheezing, rhonchi or rales.   Neurological:      Mental Status: He is alert.      Result Review  Data Reviewed:  CT Chest Without Contrast Diagnostic (04/12/2023 10:21)    1.  No change in the 2 mm LEFT lower lobe pulmonary nodule, which is likely benign.  2.  There is a new 5 mm LEFT lower lobe pulmonary nodule. This is likely  infectious or inflammatory in " nature but consider a follow-up chest CT  in 12 months based on Fleischner criteria    PFT Values          3/6/2023    11:15   Pre Drug PFT Results   FVC 96   FEV1 98   FEF 25-75% 110   FEV1/FVC 77                Assessment and Plan      Diagnoses and all orders for this visit:    1. Centrilobular emphysema (Primary)    2. Carrier of alpha-1-antitrypsin deficiency    3. Nonspecific abnormal results of pulmonary system function study    4. Left lower lobe pulmonary nodule  -     CT Chest Without Contrast Diagnostic; Future    He has significant symptoms requiring a lot of acute intervention.  Will try change to anoro for 1 week, and consider change of maintenance therapy if this is helpful.  Pt to call if helpful.  Considering normal baseline spirometry we could consider ics, perhaps air supra when available.  Plan follow up ct for nodule next april    Follow Up     Return in about 7 months (around 4/15/2024).  Patient was given instructions and counseling regarding his condition or for health maintenance advice. Please see specific information pulled into the AVS if appropriate.    Electronically signed by Ishaan Fraire MD, 9/6/2023, 18:08 CDT

## 2023-09-23 ENCOUNTER — HOSPITAL ENCOUNTER (EMERGENCY)
Facility: HOSPITAL | Age: 73
Discharge: HOME | End: 2023-09-23
Attending: INTERNAL MEDICINE
Payer: MEDICARE

## 2023-09-23 ENCOUNTER — APPOINTMENT (OUTPATIENT)
Dept: CT IMAGING | Facility: HOSPITAL | Age: 73
End: 2023-09-23
Payer: MEDICARE

## 2023-09-23 VITALS
RESPIRATION RATE: 12 BRPM | HEIGHT: 68 IN | BODY MASS INDEX: 26.52 KG/M2 | SYSTOLIC BLOOD PRESSURE: 134 MMHG | OXYGEN SATURATION: 96 % | TEMPERATURE: 97.9 F | WEIGHT: 175 LBS | DIASTOLIC BLOOD PRESSURE: 78 MMHG | HEART RATE: 90 BPM

## 2023-09-23 DIAGNOSIS — R11.2 NAUSEA AND VOMITING, UNSPECIFIED VOMITING TYPE: Primary | ICD-10-CM

## 2023-09-23 DIAGNOSIS — N28.9 RENAL LESION: ICD-10-CM

## 2023-09-23 LAB
ALBUMIN SERPL-MCNC: 3.9 G/DL (ref 3.5–5.2)
ALBUMIN/GLOB SERPL: 1.1 G/DL
ALP SERPL-CCNC: 93 U/L (ref 39–117)
ALT SERPL W P-5'-P-CCNC: 10 U/L (ref 1–41)
ANION GAP SERPL CALCULATED.3IONS-SCNC: 19 MMOL/L (ref 5–15)
AST SERPL-CCNC: 18 U/L (ref 1–40)
B PARAPERT DNA SPEC QL NAA+PROBE: NOT DETECTED
B PERT DNA SPEC QL NAA+PROBE: NOT DETECTED
BASOPHILS # BLD AUTO: 0.06 10*3/MM3 (ref 0–0.2)
BASOPHILS NFR BLD AUTO: 0.7 % (ref 0–1.5)
BILIRUB SERPL-MCNC: 0.6 MG/DL (ref 0–1.2)
BUN SERPL-MCNC: 23 MG/DL (ref 8–23)
BUN/CREAT SERPL: 16.1 (ref 7–25)
C PNEUM DNA NPH QL NAA+NON-PROBE: NOT DETECTED
CALCIUM SPEC-SCNC: 9 MG/DL (ref 8.6–10.5)
CHLORIDE SERPL-SCNC: 101 MMOL/L (ref 98–107)
CO2 SERPL-SCNC: 19 MMOL/L (ref 22–29)
CREAT SERPL-MCNC: 1.43 MG/DL (ref 0.76–1.27)
D-LACTATE SERPL-SCNC: 5.3 MMOL/L (ref 0.5–2)
DEPRECATED RDW RBC AUTO: 42.9 FL (ref 37–54)
EGFRCR SERPLBLD CKD-EPI 2021: 51.7 ML/MIN/1.73
EOSINOPHIL # BLD AUTO: 0.08 10*3/MM3 (ref 0–0.4)
EOSINOPHIL NFR BLD AUTO: 1 % (ref 0.3–6.2)
ERYTHROCYTE [DISTWIDTH] IN BLOOD BY AUTOMATED COUNT: 13.9 % (ref 12.3–15.4)
FLUAV SUBTYP SPEC NAA+PROBE: NOT DETECTED
FLUBV RNA ISLT QL NAA+PROBE: NOT DETECTED
GLOBULIN UR ELPH-MCNC: 3.5 GM/DL
GLUCOSE SERPL-MCNC: 191 MG/DL (ref 65–99)
HADV DNA SPEC NAA+PROBE: NOT DETECTED
HCOV 229E RNA SPEC QL NAA+PROBE: NOT DETECTED
HCOV HKU1 RNA SPEC QL NAA+PROBE: NOT DETECTED
HCOV NL63 RNA SPEC QL NAA+PROBE: NOT DETECTED
HCOV OC43 RNA SPEC QL NAA+PROBE: NOT DETECTED
HCT VFR BLD AUTO: 39.7 % (ref 37.5–51)
HGB BLD-MCNC: 12.6 G/DL (ref 13–17.7)
HMPV RNA NPH QL NAA+NON-PROBE: NOT DETECTED
HPIV1 RNA ISLT QL NAA+PROBE: NOT DETECTED
HPIV2 RNA SPEC QL NAA+PROBE: NOT DETECTED
HPIV3 RNA NPH QL NAA+PROBE: NOT DETECTED
HPIV4 P GENE NPH QL NAA+PROBE: NOT DETECTED
IMM GRANULOCYTES # BLD AUTO: 0.09 10*3/MM3 (ref 0–0.05)
IMM GRANULOCYTES NFR BLD AUTO: 1.1 % (ref 0–0.5)
LIPASE SERPL-CCNC: 21 U/L (ref 13–60)
LYMPHOCYTES # BLD AUTO: 1.51 10*3/MM3 (ref 0.7–3.1)
LYMPHOCYTES NFR BLD AUTO: 18.7 % (ref 19.6–45.3)
M PNEUMO IGG SER IA-ACNC: NOT DETECTED
MCH RBC QN AUTO: 27.1 PG (ref 26.6–33)
MCHC RBC AUTO-ENTMCNC: 31.7 G/DL (ref 31.5–35.7)
MCV RBC AUTO: 85.4 FL (ref 79–97)
MONOCYTES # BLD AUTO: 0.58 10*3/MM3 (ref 0.1–0.9)
MONOCYTES NFR BLD AUTO: 7.2 % (ref 5–12)
NEUTROPHILS NFR BLD AUTO: 5.77 10*3/MM3 (ref 1.7–7)
NEUTROPHILS NFR BLD AUTO: 71.3 % (ref 42.7–76)
NRBC BLD AUTO-RTO: 0 /100 WBC (ref 0–0.2)
PLATELET # BLD AUTO: 339 10*3/MM3 (ref 140–450)
PMV BLD AUTO: 9.8 FL (ref 6–12)
POTASSIUM SERPL-SCNC: 3.5 MMOL/L (ref 3.5–5.2)
PROT SERPL-MCNC: 7.4 G/DL (ref 6–8.5)
RBC # BLD AUTO: 4.65 10*6/MM3 (ref 4.14–5.8)
RHINOVIRUS RNA SPEC NAA+PROBE: NOT DETECTED
RSV RNA NPH QL NAA+NON-PROBE: NOT DETECTED
SARS-COV-2 RNA NPH QL NAA+NON-PROBE: NOT DETECTED
SODIUM SERPL-SCNC: 139 MMOL/L (ref 136–145)
WBC NRBC COR # BLD: 8.09 10*3/MM3 (ref 3.4–10.8)

## 2023-09-23 PROCEDURE — 80053 COMPREHEN METABOLIC PANEL: CPT | Performed by: INTERNAL MEDICINE

## 2023-09-23 PROCEDURE — 96374 THER/PROPH/DIAG INJ IV PUSH: CPT

## 2023-09-23 PROCEDURE — 83690 ASSAY OF LIPASE: CPT | Performed by: INTERNAL MEDICINE

## 2023-09-23 PROCEDURE — 74177 CT ABD & PELVIS W/CONTRAST: CPT

## 2023-09-23 PROCEDURE — 25510000001 IOPAMIDOL 61 % SOLUTION: Performed by: INTERNAL MEDICINE

## 2023-09-23 PROCEDURE — 25010000002 ONDANSETRON PER 1 MG: Performed by: INTERNAL MEDICINE

## 2023-09-23 PROCEDURE — 85025 COMPLETE CBC W/AUTO DIFF WBC: CPT | Performed by: INTERNAL MEDICINE

## 2023-09-23 PROCEDURE — 96375 TX/PRO/DX INJ NEW DRUG ADDON: CPT

## 2023-09-23 PROCEDURE — 25010000002 DROPERIDOL PER 5 MG: Performed by: INTERNAL MEDICINE

## 2023-09-23 PROCEDURE — 99285 EMERGENCY DEPT VISIT HI MDM: CPT

## 2023-09-23 PROCEDURE — 83605 ASSAY OF LACTIC ACID: CPT | Performed by: INTERNAL MEDICINE

## 2023-09-23 PROCEDURE — 0202U NFCT DS 22 TRGT SARS-COV-2: CPT | Performed by: INTERNAL MEDICINE

## 2023-09-23 RX ORDER — ONDANSETRON 4 MG/1
4 TABLET, ORALLY DISINTEGRATING ORAL EVERY 8 HOURS PRN
Qty: 21 TABLET | Refills: 0 | Status: SHIPPED | OUTPATIENT
Start: 2023-09-23

## 2023-09-23 RX ORDER — NALOXONE HYDROCHLORIDE 4 MG/.1ML
SPRAY NASAL
COMMUNITY

## 2023-09-23 RX ORDER — NORTRIPTYLINE HYDROCHLORIDE 10 MG/1
1 CAPSULE ORAL NIGHTLY PRN
COMMUNITY

## 2023-09-23 RX ORDER — SODIUM CHLORIDE, SODIUM LACTATE, POTASSIUM CHLORIDE, CALCIUM CHLORIDE 600; 310; 30; 20 MG/100ML; MG/100ML; MG/100ML; MG/100ML
100 INJECTION, SOLUTION INTRAVENOUS CONTINUOUS
Status: DISCONTINUED | OUTPATIENT
Start: 2023-09-23 | End: 2023-09-23 | Stop reason: HOSPADM

## 2023-09-23 RX ORDER — METHADONE HYDROCHLORIDE 5 MG/1
1 TABLET ORAL EVERY 12 HOURS PRN
COMMUNITY
End: 2023-09-23

## 2023-09-23 RX ORDER — ONDANSETRON 2 MG/ML
4 INJECTION INTRAMUSCULAR; INTRAVENOUS ONCE
Status: COMPLETED | OUTPATIENT
Start: 2023-09-23 | End: 2023-09-23

## 2023-09-23 RX ORDER — CETIRIZINE HYDROCHLORIDE 10 MG/1
1 TABLET ORAL DAILY
COMMUNITY

## 2023-09-23 RX ORDER — DROPERIDOL 2.5 MG/ML
0.62 INJECTION, SOLUTION INTRAMUSCULAR; INTRAVENOUS ONCE
Status: COMPLETED | OUTPATIENT
Start: 2023-09-23 | End: 2023-09-23

## 2023-09-23 RX ADMIN — IOPAMIDOL 100 ML: 612 INJECTION, SOLUTION INTRAVENOUS at 15:13

## 2023-09-23 RX ADMIN — ONDANSETRON 4 MG: 2 INJECTION INTRAMUSCULAR; INTRAVENOUS at 13:15

## 2023-09-23 RX ADMIN — DROPERIDOL 0.62 MG: 2.5 INJECTION, SOLUTION INTRAMUSCULAR; INTRAVENOUS at 15:18

## 2023-09-23 RX ADMIN — SODIUM CHLORIDE, POTASSIUM CHLORIDE, SODIUM LACTATE AND CALCIUM CHLORIDE 1000 ML: 600; 310; 30; 20 INJECTION, SOLUTION INTRAVENOUS at 13:17

## 2023-09-23 NOTE — ED PROVIDER NOTES
Subjective   History of Present Illness  73-year-old male presents emergency department for vomiting.  He is dry heaving on exam.  He is diaphoretic.  His symptoms started last night.  He has no diarrhea.  No abdominal pain.  He does not have nausea medications at home to take.    He notes taking a dose of Methadone last  night for the first time in several months.     Review of Systems   Gastrointestinal:  Positive for nausea and vomiting. Negative for abdominal pain and diarrhea.     Past Medical History:   Diagnosis Date    History of radiation therapy 01/03/2017    7560 cGy to prostate    Hypertension     Prostate cancer        Allergies   Allergen Reactions    Lyrica [Pregabalin] Other (See Comments)     Felt like skin was coming off       Past Surgical History:   Procedure Laterality Date    COLONOSCOPY  07/08/2010    normal    COLONOSCOPY  01/29/2004    small polyp at 25 cm,otherwise normal colonoscopy    COLONOSCOPY N/A 2/16/2018    Procedure: COLONOSCOPY WITH ANESTHESIA;  Surgeon: Abhijeet Alonso DO;  Location: [facility] ENDOSCOPY;  Service:     ENDOSCOPY  01/29/2004    gastritis    PROSTATE BIOPSY         Family History   Problem Relation Age of Onset    COPD Father     Colon cancer Father     Colon cancer Maternal Grandmother        Social History     Socioeconomic History    Marital status: Single   Tobacco Use    Smoking status: Never    Smokeless tobacco: Never   Vaping Use    Vaping Use: Never used   Substance and Sexual Activity    Alcohol use: No    Drug use: No    Sexual activity: Not Currently           Objective   Physical Exam  Vitals reviewed.   Constitutional:       Appearance: He is ill-appearing.   HENT:      Head: Normocephalic and atraumatic.      Right Ear: External ear normal.      Left Ear: External ear normal.      Nose: Nose normal.   Eyes:      General: No scleral icterus.     Conjunctiva/sclera: Conjunctivae normal.   Cardiovascular:      Rate and Rhythm: Normal rate and regular  rhythm.      Heart sounds: Normal heart sounds.   Pulmonary:      Effort: Pulmonary effort is normal.      Breath sounds: Normal breath sounds.   Abdominal:      General: There is no distension.      Palpations: Abdomen is soft.      Tenderness: There is no abdominal tenderness.   Musculoskeletal:         General: No tenderness.      Cervical back: Normal range of motion and neck supple.      Right lower leg: No edema.      Left lower leg: No edema.   Skin:     General: Skin is warm and dry.   Neurological:      Mental Status: He is alert and oriented to person, place, and time.      Cranial Nerves: No cranial nerve deficit.   Psychiatric:         Mood and Affect: Mood normal.      Comments: Appears ill. Curved in wheelchair and vomiting/retching.       Procedures           ED Course  ED Course as of 09/23/23 1556   Sat Sep 23, 2023   1421 Went back to check on the patient.  He still not a good historian.  He is not actively vomiting on my exam, but states that he is nauseated and feels like he vomited in time.  He is had no urge to have a bowel movement, does not know when his last bowel movement was but does appear to be normal.  He [place]  He also notes that he has a prescription for methadone but has not taken it since July, and decided that he would take a pill last night.  He does not know if the methadone and vomiting are related. [AJ]   1547 Patient is demanding to go home. States that he can't get out of here fast enough. Gave the patient a copy of his CT scan and told him recommended OP follow up for spot on his kidney.  [AJ]      ED Course User Index  [AJ] Yanni Upton DO           Lab Results (last 24 hours)       Procedure Component Value Units Date/Time    CBC & Differential [[id number]]  (Abnormal) Collected: 09/23/23 1314    Specimen: Blood Updated: 09/23/23 1325    Narrative:      The following orders were created for panel order CBC &  Differential.  Procedure                               Abnormality         Status                     ---------                               -----------         ------                     CBC Auto Differential[[id number]]        Abnormal            Final result                 Please view results for these tests on the individual orders.    Comprehensive Metabolic Panel [[id number]]  (Abnormal) Collected: 09/23/23 1314    Specimen: Blood Updated: 09/23/23 1344     Glucose 191 mg/dL      BUN 23 mg/dL      Creatinine 1.43 mg/dL      Sodium 139 mmol/L      Potassium 3.5 mmol/L      Comment: Slight hemolysis detected by analyzer. Results may be affected.        Chloride 101 mmol/L      CO2 19.0 mmol/L      Calcium 9.0 mg/dL      Total Protein 7.4 g/dL      Albumin 3.9 g/dL      ALT (SGPT) 10 U/L      AST (SGOT) 18 U/L      Comment: Slight hemolysis detected by analyzer. Results may be affected.        Alkaline Phosphatase 93 U/L      Total Bilirubin 0.6 mg/dL      Globulin 3.5 gm/dL      A/G Ratio 1.1 g/dL      BUN/Creatinine Ratio 16.1     Anion Gap 19.0 mmol/L      eGFR 51.7 mL/min/1.73     Narrative:      GFR Normal >60  Chronic Kidney Disease <60  Kidney Failure <15    The GFR formula is only valid for adults with stable renal function between ages 18 and 70.    Lactic Acid, Plasma [[id number]]  (Abnormal) Collected: 09/23/23 1314    Specimen: Blood Updated: 09/23/23 1343     Lactate 5.3 mmol/L     Lipase [[id number]]  (Normal) Collected: 09/23/23 1314    Specimen: Blood Updated: 09/23/23 1337     Lipase 21 U/L     CBC Auto Differential [[id number]]  (Abnormal) Collected: 09/23/23 1314    Specimen: Blood Updated: 09/23/23 1325     WBC 8.09 10*3/mm3      RBC 4.65 10*6/mm3      Hemoglobin 12.6 g/dL      Hematocrit 39.7 %      MCV 85.4 fL      MCH 27.1 pg      MCHC 31.7 g/dL      RDW 13.9 %      RDW-SD 42.9 fl      MPV 9.8 fL      Platelets 339 10*3/mm3      Neutrophil % 71.3 %      Lymphocyte % 18.7 %      Monocyte % 7.2  %      Eosinophil % 1.0 %      Basophil % 0.7 %      Immature Grans % 1.1 %      Neutrophils, Absolute 5.77 10*3/mm3      Lymphocytes, Absolute 1.51 10*3/mm3      Monocytes, Absolute 0.58 10*3/mm3      Eosinophils, Absolute 0.08 10*3/mm3      Basophils, Absolute 0.06 10*3/mm3      Immature Grans, Absolute 0.09 10*3/mm3      nRBC 0.0 /100 WBC     Respiratory Panel PCR w/COVID-19(SARS-CoV-2) MIGUEL/CHAR/JULISA/PAD/COR/MAD/DOC In-House, NP Swab in UTM/VTM, 3-4 HR TAT - Swab, Nasopharynx [[id number]]  (Normal) Collected: 09/23/23 1322    Specimen: Swab from Nasopharynx Updated: 09/23/23 1429     ADENOVIRUS, PCR Not Detected     Coronavirus 229E Not Detected     Coronavirus HKU1 Not Detected     Coronavirus NL63 Not Detected     Coronavirus OC43 Not Detected     COVID19 Not Detected     Human Metapneumovirus Not Detected     Human Rhinovirus/Enterovirus Not Detected     Influenza A PCR Not Detected     Influenza B PCR Not Detected     Parainfluenza Virus 1 Not Detected     Parainfluenza Virus 2 Not Detected     Parainfluenza Virus 3 Not Detected     Parainfluenza Virus 4 Not Detected     RSV, PCR Not Detected     Bordetella pertussis pcr Not Detected     Bordetella parapertussis PCR Not Detected     Chlamydophila pneumoniae PCR Not Detected     Mycoplasma pneumo by PCR Not Detected    Narrative:      In the setting of a positive respiratory panel with a viral infection PLUS a negative procalcitonin without other underlying concern for bacterial infection, consider observing off antibiotics or discontinuation of antibiotics and continue supportive care. If the respiratory panel is positive for atypical bacterial infection (Bordetella pertussis, Chlamydophila pneumoniae, or Mycoplasma pneumoniae), consider antibiotic de-escalation to target atypical bacterial infection.          CT Abdomen Pelvis With Contrast   Final Result          No acute findings in the abdomen/pelvis.       2 cm indeterminate partially exophytic lesion off  the posterior   interpole of the right kidney. Recommend further evaluation with CT or   MRI renal mass protocol on an outpatient basis.       Hepatic steatosis.               This report was signed and finalized on 9/23/2023 3:35 PM CDT by Pierre Junior.                                            Medical Decision Making  Problems Addressed:  Nausea and vomiting, unspecified vomiting type: complicated acute illness or injury  Renal lesion: complicated acute illness or injury    Amount and/or Complexity of Data Reviewed  Labs: ordered.     Details: CBC CMP lactic lipase  Radiology: ordered.    Risk  Prescription drug management.        Final diagnoses:   Nausea and vomiting, unspecified vomiting type   Renal lesion       ED Disposition  ED Disposition       ED Disposition   Discharge    Condition   Stable    Comment   --               Evert Tong MD  [address] [facility] [address]  [phone]    In 2 days           Medication List        New Prescriptions      ondansetron ODT 4 MG disintegrating tablet  Commonly known as: ZOFRAN-ODT  Place 1 tablet on the tongue Every 8 (Eight) Hours As Needed for Nausea or Vomiting.               Where to Get Your Medications        These medications were sent to [facility]/pharmacy #[id number] - [place], [place] - [address] - [phone]  - [phone]   [address] [address]      Phone: [phone]   ondansetron ODT 4 MG disintegrating tablet            Yanni Upton, DO  09/23/23 1323       Yanni Upton, DO  09/23/23 4571

## 2023-09-23 NOTE — DISCHARGE INSTRUCTIONS
Medication for nausea/vomiting was called into your pharmacy today.  Hold methadone until conversation with PCP.  I gave you a copy of your CT scan today to follow-up on the renal lesion that was observed.  This will require outpatient follow-up.  [place] diet.  Hydration.  Return to the emergency department if symptoms worsen or fail to improve.  Follow-up with PCP next week, if possible

## 2023-09-26 ENCOUNTER — TRANSCRIBE ORDERS (OUTPATIENT)
Dept: ADMINISTRATIVE | Facility: HOSPITAL | Age: 73
End: 2023-09-26
Payer: MEDICARE

## 2023-09-26 DIAGNOSIS — N28.89 OTHER SPECIFIED DISORDERS OF KIDNEY AND URETER: Primary | ICD-10-CM

## 2023-09-28 NOTE — PROGRESS NOTES
Subjective    Mr. Palacios is 73 y.o. male    Chief Complaint: Renal Mass     History of Present Illness  Patient here for renal mass.  CT 9/23 showed indeterminate cystic lesion right posterior mid kidney.  CT done for vomiting.  Denies flank pain or gross hematuria.      AUA 14/35 with incomplete emptying, frequency, intermittency, urgency, weak stream, nocturia X 1. Patient previously saw Dr. Griffith  for low risk prostate cancer and underwent XRT completed in 2017.     The following portions of the patient's history were reviewed and updated as appropriate: allergies, current medications, past family history, past medical history, past social history, past surgical history and problem list.    Review of Systems      Current Outpatient Medications:     albuterol sulfate  (90 Base) MCG/ACT inhaler, Inhale 2 puffs Every 4 (Four) Hours As Needed for Wheezing., Disp: 18 g, Rfl: 11    gabapentin (NEURONTIN) 800 MG tablet, Take 1 tablet by mouth 3 (Three) Times a Day. for 30 days., Disp: , Rfl:     losartan (COZAAR) 50 MG tablet, Take 1 tablet by mouth Daily., Disp: , Rfl:     naloxone (NARCAN) 4 MG/0.1ML nasal spray, USE 1 SPRAY AS NEEDED, Disp: , Rfl:     nortriptyline (PAMELOR) 10 MG capsule, Take 1 capsule by mouth At Night As Needed., Disp: , Rfl:     tiotropium bromide monohydrate (Spiriva Respimat) 2.5 MCG/ACT aerosol solution inhaler, Inhale 2 puffs Daily., Disp: 4 g, Rfl: 11    triamterene-hydrochlorothiazide (DYAZIDE) 37.5-25 MG per capsule, Take 1 capsule by mouth Every Morning., Disp: , Rfl:   No current facility-administered medications for this visit.    Past Medical History:   Diagnosis Date    History of radiation therapy 01/03/2017    7560 cGy to prostate    Hypertension     Prostate cancer        Past Surgical History:   Procedure Laterality Date    COLONOSCOPY  07/08/2010    normal    COLONOSCOPY  01/29/2004    small polyp at 25 cm,otherwise normal colonoscopy    COLONOSCOPY N/A 2/16/2018     "Procedure: COLONOSCOPY WITH ANESTHESIA;  Surgeon: Abhijeet Alonso DO;  Location: [facility] ENDOSCOPY;  Service:     ENDOSCOPY  01/29/2004    gastritis    PROSTATE BIOPSY         Social History     Socioeconomic History    Marital status: Single   Tobacco Use    Smoking status: Never    Smokeless tobacco: Never   Vaping Use    Vaping Use: Never used   Substance and Sexual Activity    Alcohol use: No    Drug use: No    Sexual activity: Not Currently       Family History   Problem Relation Age of Onset    COPD Father     Colon cancer Father     Colon cancer Maternal Grandmother        Objective    Temp 97.4 øF (36.3 øC)   Ht 172.7 cm (68\")   Wt 86.5 kg (190 lb 9.6 oz)   BMI 28.98 kg/mý     Physical Exam    CT independent review    The CT scan of the abdomen/pelvis done with and without contrast is available for me to review.  Treatment recommendations require an independent review.  First I scanned the liver, spleen, and bowel pattern.  The retroperitoneum including the major vessels and lymphatic packages are briefly reviewed.  This film has been reviewed by the radiologist to determine any nonurologic abnormalities that are present.  The kidneys are closely inspected for size, symmetry, contour, parenchymal thickness, perinephric reaction, presence of calcifications, and intrarenal dilation of the collecting system.  The ureters are inspected for their course, caliber, and any calcifications.  The bladder is inspected for its thickness, size, and presence of any calcifications.  This scan shows:    The right kidney appears multiple renal cysts, right posterior 1.8cm mass predominantly cystic (13 HU enhancement)    The left kidney appears renal cysts    The bladder appears normal on this non-contrasted CT scan.  The bladder appears normal in thickness.  There no masses or stones seen on this exam.         Results for orders placed or performed in visit on 10/09/23   POC Urinalysis Dipstick, Multipro    Specimen: " Urine   Result Value Ref Range    Color Yellow Yellow, Straw, Dark Yellow, Aleida    Clarity, UA Clear Clear    Glucose, UA Negative Negative mg/dL    Bilirubin Negative Negative    Ketones, UA Negative Negative    Specific Gravity  1.010 1.005 - 1.030    Blood, UA Negative Negative    pH, Urine 7.0 5.0 - 8.0    Protein, POC Negative Negative mg/dL    Urobilinogen, UA 0.2 E.U./dL Normal, 0.2 E.U./dL    Nitrite, UA Negative Negative    Leukocytes Negative Negative     IPSS Questionnaire (AUA-7):  Incomplete emptying  Over the past month, how often have you had a sensation of not emptying your bladder completely after you finish?: About half the time (10/09/23 1304)  Frequency  Over the past month, how often have you had to urinate again less than two hours after you finishing urinating ?: Less than half the time (10/09/23 1304)  Intermittency  Over the past month, how often have you found you stopped and started again several time when you urinated ?: Less thank 1 time in 5 (10/09/23 1304)  Urgency  Over the last month, how difficult  have you found it to postpone urination ?: Less than 1 time in 5 (10/09/23 1304)  Weak Stream  Over the past month, how often have you had a weak urinary stream ?: About half the time (10/09/23 1304)  Straining  Over the past month, how often have you had to push or strain to begin urination ?: Not at all (10/09/23 1304)  Nocturia  Over the past month, how many times did you most typically get up to urinate from the time you went to bed until the time you got up in the morning ?: 1 time (10/09/23 1304)  Quality of life due to urinary symptoms  If you were to spend the rest of your life with your urinary condition the way it is now, how would feel about that?: Pleased (10/09/23 1304)    Scores  Total IPSS Score: 11 (10/09/23 1304)  Total Score = Symtomatic Level: moderately symptomatic: 8-19 (10/09/23 1304)        Assessment and Plan    Diagnoses and all orders for this visit:    1.  Complex renal cyst (Primary)  -     POC Urinalysis Dipstick, Multipro  -     CT Abdomen Pelvis With & Without Contrast; Future    2. BPH with urinary obstruction    3. Prostate cancer        Reviewed records summarized in HPI.    Reviewed CTs personally.  Please see my interpretation above.    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  We discussed malignancy risk of 10 to 15%.  This lesion appears predominantly cystic to me and does not meet requirements for enhancement of 15 to 20 Hounsfield units.  I discussed that with the patient today.  I recommend a follow-up CT scan in 6 months.

## 2023-10-09 ENCOUNTER — HOSPITAL ENCOUNTER (OUTPATIENT)
Dept: CT IMAGING | Facility: HOSPITAL | Age: 73
Discharge: HOME | End: 2023-10-09
Admitting: FAMILY MEDICINE
Payer: MEDICARE

## 2023-10-09 ENCOUNTER — OFFICE VISIT (OUTPATIENT)
Dept: UROLOGY | Facility: CLINIC | Age: 73
End: 2023-10-09
Payer: MEDICARE

## 2023-10-09 VITALS — HEIGHT: 68 IN | BODY MASS INDEX: 28.89 KG/M2 | WEIGHT: 190.6 LBS | TEMPERATURE: 97.4 F

## 2023-10-09 DIAGNOSIS — N28.1 COMPLEX RENAL CYST: Primary | ICD-10-CM

## 2023-10-09 DIAGNOSIS — N13.8 BPH WITH URINARY OBSTRUCTION: ICD-10-CM

## 2023-10-09 DIAGNOSIS — C61 PROSTATE CANCER: ICD-10-CM

## 2023-10-09 DIAGNOSIS — N28.89 OTHER SPECIFIED DISORDERS OF KIDNEY AND URETER: ICD-10-CM

## 2023-10-09 DIAGNOSIS — N40.1 BPH WITH URINARY OBSTRUCTION: ICD-10-CM

## 2023-10-09 LAB
BILIRUB BLD-MCNC: NEGATIVE MG/DL
CLARITY, POC: CLEAR
COLOR UR: YELLOW
CREAT BLDA-MCNC: 1.7 MG/DL (ref 0.6–1.3)
GLUCOSE UR STRIP-MCNC: NEGATIVE MG/DL
KETONES UR QL: NEGATIVE
LEUKOCYTE EST, POC: NEGATIVE
NITRITE UR-MCNC: NEGATIVE MG/ML
PH UR: 7 [PH] (ref 5–8)
PROT UR STRIP-MCNC: NEGATIVE MG/DL
RBC # UR STRIP: NEGATIVE /UL
SP GR UR: 1.01 (ref 1–1.03)
UROBILINOGEN UR QL: NORMAL

## 2023-10-09 PROCEDURE — 74178 CT ABD&PLV WO CNTR FLWD CNTR: CPT

## 2023-10-09 PROCEDURE — 25510000001 IOPAMIDOL 61 % SOLUTION: Performed by: FAMILY MEDICINE

## 2023-10-09 PROCEDURE — 1159F MED LIST DOCD IN RCRD: CPT | Performed by: UROLOGY

## 2023-10-09 PROCEDURE — 99203 OFFICE O/P NEW LOW 30 MIN: CPT | Performed by: UROLOGY

## 2023-10-09 PROCEDURE — 81001 URINALYSIS AUTO W/SCOPE: CPT | Performed by: UROLOGY

## 2023-10-09 PROCEDURE — 82565 ASSAY OF CREATININE: CPT

## 2023-10-09 PROCEDURE — 1160F RVW MEDS BY RX/DR IN RCRD: CPT | Performed by: UROLOGY

## 2023-10-09 RX ADMIN — IOPAMIDOL 100 ML: 612 INJECTION, SOLUTION INTRAVENOUS at 09:30

## 2023-11-08 ENCOUNTER — OFFICE VISIT (OUTPATIENT)
Dept: GASTROENTEROLOGY | Facility: CLINIC | Age: 73
End: 2023-11-08
Payer: MEDICARE

## 2023-11-08 VITALS
OXYGEN SATURATION: 98 % | HEIGHT: 68 IN | TEMPERATURE: 97 F | DIASTOLIC BLOOD PRESSURE: 80 MMHG | BODY MASS INDEX: 26.98 KG/M2 | WEIGHT: 178 LBS | SYSTOLIC BLOOD PRESSURE: 126 MMHG | HEART RATE: 90 BPM

## 2023-11-08 DIAGNOSIS — K76.9 LIVER LESION: ICD-10-CM

## 2023-11-08 DIAGNOSIS — Z80.0 FAMILY HISTORY OF COLON CANCER: Primary | ICD-10-CM

## 2023-11-08 RX ORDER — METHADONE HYDROCHLORIDE 5 MG/1
5 TABLET ORAL EVERY 12 HOURS PRN
COMMUNITY

## 2023-11-08 NOTE — PROGRESS NOTES
"Chief Complaint   Patient presents with    Colonoscopy     7-8-10 colon normal having no problems       PCP: Evert Tong MD  REFER: Evert Tong MD    Subjective     HPI    Frederic Palacios is a 73 y.o. male who presents to office for preventative maintenance.  There is not  a personal history of colon polyps.   He does not have complaints of nausea/vomiting, change in bowels, weight loss, no BRBPR, no melena.  There is (father) a family history of colon cancer in first degree relative.  Maternal grandmother had colon cancer   There is not a family history of colon polyps in first degree relative.  Frederic Palacios last colonoscopy-2018 .  Bowels do move on regular basis.    COLONOSCOPY (02/16/2018 07:16) -normal   SCANNED - COLONOSCOPY (07/08/2010 00:00) -normal     CT Abdomen Pelvis With & Without Contrast (10/09/2023 09:30)       Lab Results - Last 18 Months   Lab Units 10/09/23  0920 09/23/23  1314 09/23/22  0938   GLUCOSE mg/dL  --  191*  --    SODIUM mmol/L  --  139  --    POTASSIUM mmol/L  --  3.5  --    CREATININE mg/dL 1.70* 1.43*  --    BUN mg/dL  --  23  --    BUN / CREAT RATIO   --  16.1  --    ALK PHOS U/L  --  93  --    ALT (SGPT) U/L  --  10  --    AST (SGOT) U/L  --  18  --    BILIRUBIN mg/dL  --  0.6  --    ALBUMIN g/dL  --  3.9 3.3       No results for input(s): \"EGFRIFNONA\", \"EGFRIFAFRI\", \"EGFRRESULT\" in the last 07716 hours.     Past Medical History:   Diagnosis Date    History of radiation therapy 01/03/2017    7560 cGy to prostate    Hypertension     Prostate cancer      Past Surgical History:   Procedure Laterality Date    COLONOSCOPY  07/08/2010    normal    COLONOSCOPY  01/29/2004    small polyp at 25 cm,otherwise normal colonoscopy    COLONOSCOPY N/A 2/16/2018    Procedure: COLONOSCOPY WITH ANESTHESIA;  Surgeon: Abhijeet Alonso DO;  Location: [facility] ENDOSCOPY;  Service:     ENDOSCOPY  01/29/2004    gastritis    PROSTATE BIOPSY       Outpatient Medications Marked as " Taking for the 11/8/23 encounter (Office Visit) with Kai Godfrey APRN   Medication Sig Dispense Refill    albuterol sulfate  (90 Base) MCG/ACT inhaler Inhale 2 puffs Every 4 (Four) Hours As Needed for Wheezing. 18 g 11    gabapentin (NEURONTIN) 800 MG tablet Take 1 tablet by mouth 3 (Three) Times a Day. for 30 days.      losartan (COZAAR) 50 MG tablet Take 1 tablet by mouth Daily.      methadone (DOLOPHINE) 5 MG tablet Take 1 tablet by mouth Every 12 (Twelve) Hours As Needed.      nortriptyline (PAMELOR) 10 MG capsule Take 1 capsule by mouth At Night As Needed.      tiotropium bromide monohydrate (Spiriva Respimat) 2.5 MCG/ACT aerosol solution inhaler Inhale 2 puffs Daily. 4 g 11    triamterene-hydrochlorothiazide (DYAZIDE) 37.5-25 MG per capsule Take 1 capsule by mouth Every Morning.       Allergies   Allergen Reactions    Lyrica [Pregabalin] Other (See Comments)     Felt like skin was coming off     Social History     Socioeconomic History    Marital status: Single   Tobacco Use    Smoking status: Never    Smokeless tobacco: Never   Vaping Use    Vaping Use: Never used   Substance and Sexual Activity    Alcohol use: No    Drug use: No    Sexual activity: Not Currently     Review of Systems   Constitutional:  Negative for fever and unexpected weight change.   HENT:  Negative for trouble swallowing.    Respiratory:  Negative for shortness of breath.    Cardiovascular:  Negative for chest pain.   Gastrointestinal:  Negative for abdominal pain and anal bleeding.     Objective   Vitals:    11/08/23 0927   BP: 126/80   Pulse: 90   Temp: 97 °F (36.1 °C)   SpO2: 98%     Physical Exam  Constitutional:       Appearance: Normal appearance. He is well-developed.   Eyes:      General: No scleral icterus.  Cardiovascular:      Heart sounds: Normal heart sounds. No murmur heard.  Pulmonary:      Effort: Pulmonary effort is normal.   Abdominal:      General: Bowel sounds are normal. There is no distension.       Palpations: Abdomen is soft.      Tenderness: There is no abdominal tenderness. There is no guarding.   Skin:     General: Skin is warm and dry.      Coloration: Skin is not jaundiced.   Neurological:      Mental Status: He is alert.   Psychiatric:         Behavior: Behavior is cooperative.       Imaging Results (Most Recent)       None          Body mass index is 27.06 kg/m².    Assessment & Plan   Diagnoses and all orders for this visit:    1. Family history of colon cancer (Primary)  Comments:  father    2. Liver lesion      * Surgery not found *    Miralax prep    CT scan ordered by urology scheduled for April 2024, Dr Gavin byrnes with reassessment of liver lesion at time of this CT scan    Did not wish to schedule toda, I asked Frederic Palacios to call office to schedule procedure     Pt declined colonoscopy evaluation at this time. Risks and benefits of colonoscopy discussed with patient. Advised some polyps can develop into a cancer and these could be removed during colonoscopy. Pt voiced understanding and continued to decline evaluation        Kai Godfrey, APRN  11/16/23        There are no Patient Instructions on file for this visit.

## 2024-01-06 ENCOUNTER — APPOINTMENT (OUTPATIENT)
Dept: GENERAL RADIOLOGY | Facility: HOSPITAL | Age: 74
End: 2024-01-06
Payer: MEDICARE

## 2024-01-06 ENCOUNTER — HOSPITAL ENCOUNTER (OUTPATIENT)
Facility: HOSPITAL | Age: 74
Setting detail: OBSERVATION
Discharge: HOME | End: 2024-01-08
Attending: EMERGENCY MEDICINE | Admitting: FAMILY MEDICINE
Payer: MEDICARE

## 2024-01-06 DIAGNOSIS — E86.0 DEHYDRATION: ICD-10-CM

## 2024-01-06 DIAGNOSIS — U07.1 COVID: ICD-10-CM

## 2024-01-06 DIAGNOSIS — N17.9 AKI (ACUTE KIDNEY INJURY): ICD-10-CM

## 2024-01-06 DIAGNOSIS — E87.6 HYPOKALEMIA: ICD-10-CM

## 2024-01-06 DIAGNOSIS — R53.1 GENERALIZED WEAKNESS: Primary | ICD-10-CM

## 2024-01-06 LAB
ALBUMIN SERPL-MCNC: 3.7 G/DL (ref 3.5–5.2)
ALBUMIN/GLOB SERPL: 0.9 G/DL
ALP SERPL-CCNC: 100 U/L (ref 39–117)
ALT SERPL W P-5'-P-CCNC: 12 U/L (ref 1–41)
ANION GAP SERPL CALCULATED.3IONS-SCNC: 15 MMOL/L (ref 5–15)
AST SERPL-CCNC: 16 U/L (ref 1–40)
BASOPHILS # BLD AUTO: 0.07 10*3/MM3 (ref 0–0.2)
BASOPHILS NFR BLD AUTO: 0.9 % (ref 0–1.5)
BILIRUB SERPL-MCNC: 0.4 MG/DL (ref 0–1.2)
BUN SERPL-MCNC: 42 MG/DL (ref 8–23)
BUN/CREAT SERPL: 18.7 (ref 7–25)
CALCIUM SPEC-SCNC: 8.7 MG/DL (ref 8.6–10.5)
CHLORIDE SERPL-SCNC: 101 MMOL/L (ref 98–107)
CO2 SERPL-SCNC: 21 MMOL/L (ref 22–29)
CREAT SERPL-MCNC: 2.25 MG/DL (ref 0.76–1.27)
CRP SERPL-MCNC: 4.07 MG/DL (ref 0–0.5)
D DIMER PPP FEU-MCNC: 0.44 MCGFEU/ML (ref 0–0.73)
DEPRECATED RDW RBC AUTO: 40.3 FL (ref 37–54)
EGFRCR SERPLBLD CKD-EPI 2021: 30 ML/MIN/1.73
EOSINOPHIL # BLD AUTO: 0.18 10*3/MM3 (ref 0–0.4)
EOSINOPHIL NFR BLD AUTO: 2.4 % (ref 0.3–6.2)
ERYTHROCYTE [DISTWIDTH] IN BLOOD BY AUTOMATED COUNT: 13.1 % (ref 12.3–15.4)
FLUAV RNA RESP QL NAA+PROBE: NOT DETECTED
FLUBV RNA RESP QL NAA+PROBE: NOT DETECTED
GEN 5 2HR TROPONIN T REFLEX: 21 NG/L
GLOBULIN UR ELPH-MCNC: 3.9 GM/DL
GLUCOSE SERPL-MCNC: 121 MG/DL (ref 65–99)
HCT VFR BLD AUTO: 39.9 % (ref 37.5–51)
HGB BLD-MCNC: 12.8 G/DL (ref 13–17.7)
IMM GRANULOCYTES # BLD AUTO: 0.27 10*3/MM3 (ref 0–0.05)
IMM GRANULOCYTES NFR BLD AUTO: 3.6 % (ref 0–0.5)
LYMPHOCYTES # BLD AUTO: 1.5 10*3/MM3 (ref 0.7–3.1)
LYMPHOCYTES NFR BLD AUTO: 20 % (ref 19.6–45.3)
MAGNESIUM SERPL-MCNC: 2 MG/DL (ref 1.6–2.4)
MCH RBC QN AUTO: 26.9 PG (ref 26.6–33)
MCHC RBC AUTO-ENTMCNC: 32.1 G/DL (ref 31.5–35.7)
MCV RBC AUTO: 83.8 FL (ref 79–97)
MONOCYTES # BLD AUTO: 0.65 10*3/MM3 (ref 0.1–0.9)
MONOCYTES NFR BLD AUTO: 8.7 % (ref 5–12)
NEUTROPHILS NFR BLD AUTO: 4.84 10*3/MM3 (ref 1.7–7)
NEUTROPHILS NFR BLD AUTO: 64.4 % (ref 42.7–76)
NRBC BLD AUTO-RTO: 0 /100 WBC (ref 0–0.2)
NT-PROBNP SERPL-MCNC: 143.2 PG/ML (ref 0–900)
PLATELET # BLD AUTO: 384 10*3/MM3 (ref 140–450)
PMV BLD AUTO: 9.7 FL (ref 6–12)
POTASSIUM SERPL-SCNC: 3.2 MMOL/L (ref 3.5–5.2)
PROCALCITONIN SERPL-MCNC: 0.61 NG/ML (ref 0–0.25)
PROT SERPL-MCNC: 7.6 G/DL (ref 6–8.5)
RBC # BLD AUTO: 4.76 10*6/MM3 (ref 4.14–5.8)
SARS-COV-2 RNA RESP QL NAA+PROBE: DETECTED
SODIUM SERPL-SCNC: 137 MMOL/L (ref 136–145)
TROPONIN T DELTA: 0 NG/L
TROPONIN T SERPL HS-MCNC: 21 NG/L
WBC NRBC COR # BLD AUTO: 7.51 10*3/MM3 (ref 3.4–10.8)

## 2024-01-06 PROCEDURE — 25810000003 SODIUM CHLORIDE 0.9 % SOLUTION: Performed by: NURSE PRACTITIONER

## 2024-01-06 PROCEDURE — 36415 COLL VENOUS BLD VENIPUNCTURE: CPT | Performed by: EMERGENCY MEDICINE

## 2024-01-06 PROCEDURE — 83735 ASSAY OF MAGNESIUM: CPT | Performed by: EMERGENCY MEDICINE

## 2024-01-06 PROCEDURE — 36415 COLL VENOUS BLD VENIPUNCTURE: CPT

## 2024-01-06 PROCEDURE — 96360 HYDRATION IV INFUSION INIT: CPT

## 2024-01-06 PROCEDURE — 84484 ASSAY OF TROPONIN QUANT: CPT | Performed by: EMERGENCY MEDICINE

## 2024-01-06 PROCEDURE — G0378 HOSPITAL OBSERVATION PER HR: HCPCS

## 2024-01-06 PROCEDURE — 83880 ASSAY OF NATRIURETIC PEPTIDE: CPT | Performed by: EMERGENCY MEDICINE

## 2024-01-06 PROCEDURE — 71045 X-RAY EXAM CHEST 1 VIEW: CPT

## 2024-01-06 PROCEDURE — 84145 PROCALCITONIN (PCT): CPT | Performed by: EMERGENCY MEDICINE

## 2024-01-06 PROCEDURE — 87636 SARSCOV2 & INF A&B AMP PRB: CPT | Performed by: EMERGENCY MEDICINE

## 2024-01-06 PROCEDURE — 93005 ELECTROCARDIOGRAM TRACING: CPT | Performed by: EMERGENCY MEDICINE

## 2024-01-06 PROCEDURE — 85379 FIBRIN DEGRADATION QUANT: CPT | Performed by: EMERGENCY MEDICINE

## 2024-01-06 PROCEDURE — 96361 HYDRATE IV INFUSION ADD-ON: CPT

## 2024-01-06 PROCEDURE — 86140 C-REACTIVE PROTEIN: CPT | Performed by: EMERGENCY MEDICINE

## 2024-01-06 PROCEDURE — 85025 COMPLETE CBC W/AUTO DIFF WBC: CPT | Performed by: EMERGENCY MEDICINE

## 2024-01-06 PROCEDURE — 99284 EMERGENCY DEPT VISIT MOD MDM: CPT

## 2024-01-06 PROCEDURE — 80053 COMPREHEN METABOLIC PANEL: CPT | Performed by: EMERGENCY MEDICINE

## 2024-01-06 RX ORDER — QUETIAPINE FUMARATE 25 MG/1
25 TABLET, FILM COATED ORAL NIGHTLY
Status: DISCONTINUED | OUTPATIENT
Start: 2024-01-06 | End: 2024-01-08 | Stop reason: HOSPADM

## 2024-01-06 RX ORDER — GABAPENTIN 400 MG/1
800 CAPSULE ORAL 3 TIMES DAILY
Status: DISCONTINUED | OUTPATIENT
Start: 2024-01-06 | End: 2024-01-08 | Stop reason: HOSPADM

## 2024-01-06 RX ORDER — CALCIUM CARBONATE 500 MG/1
3 TABLET, CHEWABLE ORAL 3 TIMES DAILY PRN
Status: DISCONTINUED | OUTPATIENT
Start: 2024-01-06 | End: 2024-01-08 | Stop reason: HOSPADM

## 2024-01-06 RX ORDER — GUAIFENESIN/DEXTROMETHORPHAN 100-10MG/5
5 SYRUP ORAL EVERY 4 HOURS PRN
Status: DISCONTINUED | OUTPATIENT
Start: 2024-01-06 | End: 2024-01-08 | Stop reason: HOSPADM

## 2024-01-06 RX ORDER — IPRATROPIUM BROMIDE AND ALBUTEROL SULFATE 2.5; .5 MG/3ML; MG/3ML
3 SOLUTION RESPIRATORY (INHALATION)
Status: DISCONTINUED | OUTPATIENT
Start: 2024-01-06 | End: 2024-01-07

## 2024-01-06 RX ORDER — POLYETHYLENE GLYCOL 3350 17 G/17G
17 POWDER, FOR SOLUTION ORAL DAILY
Status: DISCONTINUED | OUTPATIENT
Start: 2024-01-06 | End: 2024-01-08 | Stop reason: HOSPADM

## 2024-01-06 RX ORDER — ACETAMINOPHEN 325 MG/1
650 TABLET ORAL EVERY 6 HOURS PRN
Status: DISCONTINUED | OUTPATIENT
Start: 2024-01-06 | End: 2024-01-08 | Stop reason: HOSPADM

## 2024-01-06 RX ORDER — SODIUM CHLORIDE 9 MG/ML
100 INJECTION, SOLUTION INTRAVENOUS CONTINUOUS
Status: DISCONTINUED | OUTPATIENT
Start: 2024-01-06 | End: 2024-01-08 | Stop reason: HOSPADM

## 2024-01-06 RX ADMIN — QUETIAPINE FUMARATE 25 MG: 25 TABLET, FILM COATED ORAL at 21:39

## 2024-01-06 RX ADMIN — GABAPENTIN 800 MG: 400 CAPSULE ORAL at 21:39

## 2024-01-06 RX ADMIN — SODIUM CHLORIDE 100 ML/HR: 9 INJECTION, SOLUTION INTRAVENOUS at 17:58

## 2024-01-06 RX ADMIN — ANTACID TABLETS 3 TABLET: 500 TABLET, CHEWABLE ORAL at 21:39

## 2024-01-06 NOTE — Clinical Note
Level of Care: Telemetry [5]   Diagnosis: Generalized weakness [054325]   Admitting Physician: NADEGE CHILDERS [[id number]]   Attending Physician: NADEGE CHILDERS [[id number]]   Certification: I Certify That Inpatient Hospital Services Are Medically Necessary For Greater Than 2 Midnights

## 2024-01-06 NOTE — ED PROVIDER NOTES
Subjective   History of Present Illness  Patient recently diagnosed with COVID and complaining of generalized fatigue and weakness and weight loss.  No shortness of breath no chest pain    Weakness - Generalized  Severity:  Moderate  Onset quality:  Gradual  Timing:  Constant  Progression:  Worsening  Chronicity:  New  Context: not alcohol use, not allergies, not change in medication, not decreased sleep, not drug use, not increased activity, not stress and not urinary tract infection    Relieved by:  Nothing  Worsened by:  Nothing  Ineffective treatments:  None tried  Associated symptoms: anorexia and lethargy    Associated symptoms: no abdominal pain, no aphasia, no arthralgias, no ataxia, no chest pain, no cough, no diarrhea, no difficulty walking, no dizziness, no drooling, no numbness in extremities, no falls, no fever, no foul-smelling urine, no frequency, no headaches, no loss of consciousness, no melena, no nausea, no near-syncope, no seizures, no sensory-motor deficit, no shortness of breath, no stroke symptoms, no syncope and no vomiting    Risk factors: no anemia, no congestive heart failure, no coronary artery disease, no family hx of stroke, no heart disease and no new medications        Review of Systems   Constitutional: Negative.  Negative for fever.   HENT: Negative.  Negative for drooling.    Eyes: Negative.    Respiratory: Negative.  Negative for cough and shortness of breath.    Cardiovascular: Negative.  Negative for chest pain, syncope and near-syncope.   Gastrointestinal:  Positive for anorexia. Negative for abdominal pain, diarrhea, melena, nausea and vomiting.   Endocrine: Negative.    Genitourinary: Negative.  Negative for frequency.   Musculoskeletal:  Negative for arthralgias and falls.   Skin: Negative.    Neurological:  Negative for dizziness, seizures, loss of consciousness and headaches.   Hematological: Negative.    All other systems reviewed and are negative.      Past Medical  History:   Diagnosis Date    History of radiation therapy 01/03/2017    7560 cGy to prostate    Hypertension     Prostate cancer        Allergies   Allergen Reactions    Lyrica [Pregabalin] Other (See Comments)     Felt like skin was coming off       Past Surgical History:   Procedure Laterality Date    COLONOSCOPY  07/08/2010    normal    COLONOSCOPY  01/29/2004    small polyp at 25 cm,otherwise normal colonoscopy    COLONOSCOPY N/A 2/16/2018    Procedure: COLONOSCOPY WITH ANESTHESIA;  Surgeon: Abhijeet Alonso DO;  Location: [facility] ENDOSCOPY;  Service:     ENDOSCOPY  01/29/2004    gastritis    PROSTATE BIOPSY         Family History   Problem Relation Age of Onset    COPD Father     Colon cancer Father     Colon cancer Maternal Grandmother        Social History     Socioeconomic History    Marital status: Single   Tobacco Use    Smoking status: Never    Smokeless tobacco: Never   Vaping Use    Vaping Use: Never used   Substance and Sexual Activity    Alcohol use: No    Drug use: No    Sexual activity: Not Currently           Objective   Physical Exam  Vitals and nursing note reviewed. Exam conducted with a chaperone present.   Constitutional:       General: He is awake. He is not in acute distress.     Appearance: Normal appearance. He is well-developed. He is not ill-appearing or toxic-appearing.   HENT:      Head: Normocephalic and atraumatic.      Right Ear: Tympanic membrane normal.      Left Ear: Tympanic membrane normal.      Nose: No congestion.   Eyes:      General: Lids are normal.      Conjunctiva/sclera: Conjunctivae normal.      Pupils: Pupils are equal, round, and reactive to light.   Cardiovascular:      Rate and Rhythm: Normal rate and regular rhythm.      Chest Wall: PMI is not displaced.      Pulses: Normal pulses.      Heart sounds: Normal heart sounds. No murmur heard.  Pulmonary:      Effort: Pulmonary effort is normal.      Breath sounds: Normal breath sounds. No decreased breath sounds.    Abdominal:      General: Abdomen is flat. Bowel sounds are normal.      Palpations: Abdomen is soft.      Tenderness: There is no abdominal tenderness.      Hernia: No hernia is present.   Musculoskeletal:         General: No deformity. Normal range of motion.      Cervical back: Full passive range of motion without pain, normal range of motion and neck supple. No rigidity.   Skin:     General: Skin is warm and dry.      Capillary Refill: Capillary refill takes less than 2 seconds.      Coloration: Skin is not jaundiced.   Neurological:      General: No focal deficit present.      Mental Status: He is alert and oriented to person, place, and time. Mental status is at baseline.      Cranial Nerves: No cranial nerve deficit.      Motor: Motor function is intact. No weakness.      Deep Tendon Reflexes: Reflexes are normal and symmetric.   Psychiatric:         Behavior: Behavior is cooperative.         Procedures           ED Course  ED Course as of 01/06/24 1455   Sat Jan 06, 2024   1419 Normal sinus rhythm [TS]   1419 Years Algorithm for Pulmonary Embolus  To be used in hemodynamically stable patient >18 years old    No signs of DVT are present, there is no hemoptysis, PE is not the most likely diagnosis and the D-dimer is not greater or equal to 1000 ng/mL. Therefore PE is excluded . The Years algorithm rules out PE (0.43 % with symptomatic VTE during 3-month follow-up) [TS]   1441 No clinical evidence of CHF this is viral pneumonia [TS]   1452 Patient came to the ED with generalized weakness fatigue has got acute kidney injury possibly dehydration with hypokalemia.  Potassium was being replaced.  Magnesium within normal limits.  There is no clinical evidence of infection or infection BNP is negative.  And dimer is negative.  Will be admitted to the medicine service for further evaluation. [TS]      ED Course User Index  [TS] Jayesh Greene MD                                             Medical Decision  Making  Generalized weakness differential could be neuromuscular weakness which could be upper motor neuron versus lower motor neuron including CVAs strokes spinal cord problems spinal cord diseases like infection trauma inflammation etc.  Other things that can cause generalized weakness are  peripheral nerve disease with toxins ,neuropathies, and endocrine abnormalities.  Muscle diseases like dermatomyositis rhabdomyolysis and alcoholic myopathy can give rise to weakness  Non  neuromuscular diseases like coronary syndromes, arrhythmias, infection, hypoglycemia, thyroid disease and respiratory failure can cause generalized weakness also hypokalemia, hypomagnesemia, hypo or hypernatremia ,polypharmacy ,hypothyroidism and malignancies can cause generalized weakness      Problems Addressed:  KAIT (acute kidney injury): acute illness or injury     Details: Patient with possible prerenal acute kidney injury was given IV fluids will continue with IV fluids and see how he does.  COVID: acute illness or injury     Details: Diagnosed 4 days ago still having symptoms i.e. weakness etc. but no cough or shortness of breath.  Dehydration: acute illness or injury     Details: Moderate dehydration we will replace IV fluids.  Generalized weakness: acute illness or injury  Hypokalemia: acute illness or injury     Details: Mild hypokalemia can be treated with p.o. potassium replacements.    Amount and/or Complexity of Data Reviewed  Labs: ordered.     Details: Labs reviewed  Radiology: ordered.     Details: X-rays reviewed  ECG/medicine tests: ordered.     Details: Nonischemic  Discussion of management or test interpretation with external provider(s): Discussed with YVES Saxena  with Dr. Tong    Risk  Decision regarding hospitalization.  Risk Details: Will be admitted to medicine service further evaluation assessment.        Final diagnoses:   Generalized weakness   COVID   Hypokalemia   KAIT (acute kidney injury)   Dehydration        ED Disposition  ED Disposition       ED Disposition   Decision to Admit    Condition   --    Comment   Level of Care: Telemetry [5]   Diagnosis: Generalized weakness [719494]   Admitting Physician: NADEGE CHILDERS [[id number]]   Attending Physician: NADEGE CHILDERS [[id number]]   Certification: I Certify That Inpatient Hospital Services Are Medically Necessary For Greater Than 2 Midnights                 No follow-up provider specified.       Medication List      No changes were made to your prescriptions during this visit.            Jayesh Greene MD  01/06/24 0324       Jayesh Greene MD  01/06/24 3566     Statement Selected

## 2024-01-06 NOTE — PLAN OF CARE
Goal Outcome Evaluation:  Plan of Care Reviewed With: patient        Progress: no change  Outcome Evaluation: Pt denies pain; MD on call notified of admission; awaiting orders; safety maintained.

## 2024-01-06 NOTE — ED NOTES
"Nursing report ED to floor  Frederic Palacios  73 y.o.  male    HPI:   Chief Complaint   Patient presents with    Weakness - Generalized       Admitting doctor:   Evert Tong MD    Consulting provider(s):  Consults       No orders found from 12/8/2023 to 1/7/2024.             Admitting diagnosis:   The primary encounter diagnosis was Generalized weakness. Diagnoses of COVID, Hypokalemia, KAIT (acute kidney injury), and Dehydration were also pertinent to this visit.    Code status:   Current Code Status       Date Active Code Status Order ID Comments User Context       Not on file            Allergies:   Lyrica [pregabalin]    Intake and Output  No intake or output data in the 24 hours ending 01/06/24 1517    Weight:       01/06/24  1339   Weight: 79.4 kg (175 lb)       Most recent vitals:   Vitals:    01/06/24 1339   BP: 152/78   Pulse: 83   Resp: 18   Temp: 97.7 °F (36.5 °C)   SpO2: 98%   Weight: 79.4 kg (175 lb)   Height: 172.7 cm (68\")     Oxygen Therapy: .    Active LDAs/IV Access:   Lines, Drains & Airways       Active LDAs       Name Placement date Placement time Site Days    Peripheral IV 01/06/24 1517 Anterior;Distal;Left Forearm 01/06/24  1517  Forearm  less than 1                    Labs (abnormal labs have a star):   Labs Reviewed   COVID-19 AND FLU A/B, NP SWAB IN TRANSPORT MEDIA 1 HR TAT - Abnormal; Notable for the following components:       Result Value    COVID19 Detected (*)     All other components within normal limits    Narrative:     Fact sheet for providers: https://www.fda.gov/media/584892/download    Fact sheet for patients: https://www.fda.gov/media/792240/download    Test performed by PCR.   COMPREHENSIVE METABOLIC PANEL - Abnormal; Notable for the following components:    Glucose 121 (*)     BUN 42 (*)     Creatinine 2.25 (*)     Potassium 3.2 (*)     CO2 21.0 (*)     eGFR 30.0 (*)     All other components within normal limits    Narrative:     GFR Normal >60  Chronic Kidney Disease " "<60  Kidney Failure <15    The GFR formula is only valid for adults with stable renal function between ages 18 and 70.   PROCALCITONIN - Abnormal; Notable for the following components:    Procalcitonin 0.61 (*)     All other components within normal limits    Narrative:     As a Marker for Sepsis (Non-Neonates):    1. <0.5 ng/mL represents a low risk of severe sepsis and/or septic shock.  2. >2 ng/mL represents a high risk of severe sepsis and/or septic shock.    As a Marker for Lower Respiratory Tract Infections that require antibiotic therapy:    PCT on Admission    Antibiotic Therapy       6-12 Hrs later    >0.5                Strongly Recommended  >0.25 - <0.5        Recommended   0.1 - 0.25          Discouraged              Remeasure/reassess PCT  <0.1                Strongly Discouraged     Remeasure/reassess PCT    As 28 day mortality risk marker: \"Change in Procalcitonin Result\" (>80% or <=80%) if Day 0 (or Day 1) and Day 4 values are available. Refer to http://www.[facility] [place]p[place]    Change in PCT <=80%  A decrease of PCT levels below or equal to 80% defines a positive change in PCT test result representing a higher risk for 28-day all-cause mortality of patients diagnosed with severe sepsis for septic shock.    Change in PCT >80%  A decrease of PCT levels of more than 80% defines a negative change in PCT result representing a lower risk for 28-day all-cause mortality of patients diagnosed with severe sepsis or septic shock.      C-REACTIVE PROTEIN - Abnormal; Notable for the following components:    C-Reactive Protein 4.07 (*)     All other components within normal limits   CBC WITH AUTO DIFFERENTIAL - Abnormal; Notable for the following components:    Hemoglobin 12.8 (*)     Immature Grans % 3.6 (*)     Immature Grans, Absolute 0.27 (*)     All other components within normal limits   D-DIMER, QUANTITATIVE - Normal    Narrative:     According to the assay 's published package insert, a " "normal (<0.50 MCGFEU/mL) D-dimer result in conjunction with a non-high clinical probability assessment, excludes deep vein thrombosis (DVT) and pulmonary embolism (PE) with high sensitivity.    D-dimer values increase with age and this can make VTE exclusion of an older population difficult. To address this, the [employer], based on best available evidence and recent guidelines, recommends that clinicians use age-adjusted D-dimer thresholds in patients greater than 50 years of age with: a) a low probability of PE who do not meet all Pulmonary Embolism Rule Out Criteria, or b) in those with intermediate probability of PE.   The formula for an age-adjusted D-dimer cut-off is \"age/100\".  For example, a 60 year old patient would have an age-adjusted cut-off of 0.60 MCGFEU/mL and an 80 year old 0.80 MCGFEU/mL.   BNP (IN-HOUSE) - Normal    Narrative:     This assay is used as an aid in the diagnosis of individuals suspected of having heart failure. It can be used as an aid in the diagnosis of acute decompensated heart failure (ADHF) in patients presenting with signs and symptoms of ADHF to the emergency department (ED). In addition, NT-proBNP of <300 pg/mL indicates ADHF is not likely.    Age Range Result Interpretation  NT-proBNP Concentration (pg/mL:      <50             Positive            >450                   Gray                 300-450                    Negative             <300    50-75           Positive            >900                  Gray                300-900                  Negative            <300      >75             Positive            >1800                  Gray                300-1800                  Negative            <300   TROPONIN - Normal    Narrative:     High Sensitive Troponin T Reference Range:  <14.0 ng/L- Negative Female for AMI  <22.0 ng/L- Negative Male for AMI  >=14 - Abnormal Female indicating possible myocardial injury.  >=22 - Abnormal Male indicating possible " myocardial injury.   Clinicians would have to utilize clinical acumen, EKG, Troponin, and serial changes to determine if it is an Acute Myocardial Infarction or myocardial injury due to an underlying chronic condition.        MAGNESIUM - Normal   COVID PRE-OP / PRE-PROCEDURE SCREENING ORDER (NO ISOLATION)    Narrative:     The following orders were created for panel order COVID PRE-OP / PRE-PROCEDURE SCREENING ORDER (NO ISOLATION) - Swab, Nasal Cavity.  Procedure                               Abnormality         Status                     ---------                               -----------         ------                     COVID-19 and FLU A/B PCR...[[id number]]  Abnormal            Final result                 Please view results for these tests on the individual orders.   HIGH SENSITIVITIY TROPONIN T 2HR   CBC AND DIFFERENTIAL    Narrative:     The following orders were created for panel order CBC & Differential.  Procedure                               Abnormality         Status                     ---------                               -----------         ------                     CBC Auto Differential[[id number]]        Abnormal            Final result                 Please view results for these tests on the individual orders.       Meds given in ED:   Medications - No data to display  No current facility-administered medications for this encounter.       NIH Stroke Scale:       Isolation/Infection(s):  No active isolations   COVID (confirmed)     COVID Testing  Collected .  Resulted .    Nursing report ED to floor:  Mental status: .  Ambulatory status: .  Precautions: .    ED nurse phone extentsion- ..

## 2024-01-07 LAB
ANION GAP SERPL CALCULATED.3IONS-SCNC: 12 MMOL/L (ref 5–15)
BASOPHILS # BLD AUTO: 0.08 10*3/MM3 (ref 0–0.2)
BASOPHILS NFR BLD AUTO: 1.2 % (ref 0–1.5)
BUN SERPL-MCNC: 34 MG/DL (ref 8–23)
BUN/CREAT SERPL: 18.9 (ref 7–25)
CALCIUM SPEC-SCNC: 8.4 MG/DL (ref 8.6–10.5)
CHLORIDE SERPL-SCNC: 104 MMOL/L (ref 98–107)
CO2 SERPL-SCNC: 23 MMOL/L (ref 22–29)
CREAT SERPL-MCNC: 1.8 MG/DL (ref 0.76–1.27)
DEPRECATED RDW RBC AUTO: 41.2 FL (ref 37–54)
EGFRCR SERPLBLD CKD-EPI 2021: 39.3 ML/MIN/1.73
EOSINOPHIL # BLD AUTO: 0.29 10*3/MM3 (ref 0–0.4)
EOSINOPHIL NFR BLD AUTO: 4.2 % (ref 0.3–6.2)
ERYTHROCYTE [DISTWIDTH] IN BLOOD BY AUTOMATED COUNT: 13.2 % (ref 12.3–15.4)
GLUCOSE SERPL-MCNC: 106 MG/DL (ref 65–99)
HCT VFR BLD AUTO: 36.9 % (ref 37.5–51)
HGB BLD-MCNC: 11.5 G/DL (ref 13–17.7)
IMM GRANULOCYTES # BLD AUTO: 0.3 10*3/MM3 (ref 0–0.05)
IMM GRANULOCYTES NFR BLD AUTO: 4.3 % (ref 0–0.5)
LYMPHOCYTES # BLD AUTO: 1.15 10*3/MM3 (ref 0.7–3.1)
LYMPHOCYTES NFR BLD AUTO: 16.6 % (ref 19.6–45.3)
MCH RBC QN AUTO: 26.6 PG (ref 26.6–33)
MCHC RBC AUTO-ENTMCNC: 31.2 G/DL (ref 31.5–35.7)
MCV RBC AUTO: 85.2 FL (ref 79–97)
MONOCYTES # BLD AUTO: 0.86 10*3/MM3 (ref 0.1–0.9)
MONOCYTES NFR BLD AUTO: 12.4 % (ref 5–12)
NEUTROPHILS NFR BLD AUTO: 4.26 10*3/MM3 (ref 1.7–7)
NEUTROPHILS NFR BLD AUTO: 61.3 % (ref 42.7–76)
NRBC BLD AUTO-RTO: 0 /100 WBC (ref 0–0.2)
PLATELET # BLD AUTO: 347 10*3/MM3 (ref 140–450)
PMV BLD AUTO: 10 FL (ref 6–12)
POTASSIUM SERPL-SCNC: 3.3 MMOL/L (ref 3.5–5.2)
RBC # BLD AUTO: 4.33 10*6/MM3 (ref 4.14–5.8)
SODIUM SERPL-SCNC: 139 MMOL/L (ref 136–145)
WBC NRBC COR # BLD AUTO: 6.94 10*3/MM3 (ref 3.4–10.8)

## 2024-01-07 PROCEDURE — 97165 OT EVAL LOW COMPLEX 30 MIN: CPT

## 2024-01-07 PROCEDURE — 25810000003 SODIUM CHLORIDE 0.9 % SOLUTION: Performed by: NURSE PRACTITIONER

## 2024-01-07 PROCEDURE — 94799 UNLISTED PULMONARY SVC/PX: CPT

## 2024-01-07 PROCEDURE — 85025 COMPLETE CBC W/AUTO DIFF WBC: CPT | Performed by: NURSE PRACTITIONER

## 2024-01-07 PROCEDURE — 80048 BASIC METABOLIC PNL TOTAL CA: CPT | Performed by: NURSE PRACTITIONER

## 2024-01-07 PROCEDURE — 25010000002 ENOXAPARIN PER 10 MG: Performed by: NURSE PRACTITIONER

## 2024-01-07 PROCEDURE — 96372 THER/PROPH/DIAG INJ SC/IM: CPT

## 2024-01-07 PROCEDURE — G0378 HOSPITAL OBSERVATION PER HR: HCPCS

## 2024-01-07 PROCEDURE — 94640 AIRWAY INHALATION TREATMENT: CPT

## 2024-01-07 PROCEDURE — 96361 HYDRATE IV INFUSION ADD-ON: CPT

## 2024-01-07 RX ORDER — ONDANSETRON 2 MG/ML
4 INJECTION INTRAMUSCULAR; INTRAVENOUS EVERY 6 HOURS PRN
Status: DISCONTINUED | OUTPATIENT
Start: 2024-01-07 | End: 2024-01-08 | Stop reason: HOSPADM

## 2024-01-07 RX ORDER — ONDANSETRON 4 MG/1
4 TABLET, ORALLY DISINTEGRATING ORAL EVERY 6 HOURS PRN
Status: DISCONTINUED | OUTPATIENT
Start: 2024-01-07 | End: 2024-01-08 | Stop reason: HOSPADM

## 2024-01-07 RX ORDER — TRIAMTERENE AND HYDROCHLOROTHIAZIDE 37.5; 25 MG/1; MG/1
1 TABLET ORAL DAILY
Status: DISCONTINUED | OUTPATIENT
Start: 2024-01-07 | End: 2024-01-08 | Stop reason: HOSPADM

## 2024-01-07 RX ORDER — ALBUTEROL SULFATE 90 UG/1
2 AEROSOL, METERED RESPIRATORY (INHALATION)
Status: DISCONTINUED | OUTPATIENT
Start: 2024-01-07 | End: 2024-01-08 | Stop reason: HOSPADM

## 2024-01-07 RX ORDER — SODIUM CHLORIDE 0.9 % (FLUSH) 0.9 %
10 SYRINGE (ML) INJECTION EVERY 12 HOURS SCHEDULED
Status: DISCONTINUED | OUTPATIENT
Start: 2024-01-07 | End: 2024-01-08 | Stop reason: HOSPADM

## 2024-01-07 RX ORDER — LOSARTAN POTASSIUM 50 MG/1
50 TABLET ORAL DAILY
Status: DISCONTINUED | OUTPATIENT
Start: 2024-01-07 | End: 2024-01-08 | Stop reason: HOSPADM

## 2024-01-07 RX ORDER — POLYETHYLENE GLYCOL 3350 17 G/17G
17 POWDER, FOR SOLUTION ORAL DAILY PRN
Status: DISCONTINUED | OUTPATIENT
Start: 2024-01-07 | End: 2024-01-08 | Stop reason: HOSPADM

## 2024-01-07 RX ORDER — ENOXAPARIN SODIUM 100 MG/ML
40 INJECTION SUBCUTANEOUS EVERY 24 HOURS
Status: DISCONTINUED | OUTPATIENT
Start: 2024-01-07 | End: 2024-01-08 | Stop reason: HOSPADM

## 2024-01-07 RX ORDER — BISACODYL 10 MG
10 SUPPOSITORY, RECTAL RECTAL DAILY PRN
Status: DISCONTINUED | OUTPATIENT
Start: 2024-01-07 | End: 2024-01-08 | Stop reason: HOSPADM

## 2024-01-07 RX ORDER — BISACODYL 5 MG/1
5 TABLET, DELAYED RELEASE ORAL DAILY PRN
Status: DISCONTINUED | OUTPATIENT
Start: 2024-01-07 | End: 2024-01-08 | Stop reason: HOSPADM

## 2024-01-07 RX ORDER — AMOXICILLIN 250 MG
2 CAPSULE ORAL 2 TIMES DAILY
Status: DISCONTINUED | OUTPATIENT
Start: 2024-01-07 | End: 2024-01-08 | Stop reason: HOSPADM

## 2024-01-07 RX ORDER — SODIUM CHLORIDE 9 MG/ML
40 INJECTION, SOLUTION INTRAVENOUS AS NEEDED
Status: DISCONTINUED | OUTPATIENT
Start: 2024-01-07 | End: 2024-01-08 | Stop reason: HOSPADM

## 2024-01-07 RX ORDER — SODIUM CHLORIDE 0.9 % (FLUSH) 0.9 %
10 SYRINGE (ML) INJECTION AS NEEDED
Status: DISCONTINUED | OUTPATIENT
Start: 2024-01-07 | End: 2024-01-08 | Stop reason: HOSPADM

## 2024-01-07 RX ADMIN — SODIUM CHLORIDE 100 ML/HR: 9 INJECTION, SOLUTION INTRAVENOUS at 14:02

## 2024-01-07 RX ADMIN — GABAPENTIN 800 MG: 400 CAPSULE ORAL at 20:57

## 2024-01-07 RX ADMIN — TRIAMTERENE AND HYDROCHLOROTHIAZIDE 1 TABLET: 37.5; 25 TABLET ORAL at 09:25

## 2024-01-07 RX ADMIN — Medication 10 ML: at 09:26

## 2024-01-07 RX ADMIN — GABAPENTIN 800 MG: 400 CAPSULE ORAL at 09:25

## 2024-01-07 RX ADMIN — ALBUTEROL SULFATE 2 PUFF: 90 AEROSOL, METERED RESPIRATORY (INHALATION) at 10:07

## 2024-01-07 RX ADMIN — IPRATROPIUM BROMIDE 2 PUFF: 17 AEROSOL, METERED RESPIRATORY (INHALATION) at 14:49

## 2024-01-07 RX ADMIN — LOSARTAN POTASSIUM 50 MG: 50 TABLET, FILM COATED ORAL at 09:24

## 2024-01-07 RX ADMIN — Medication 10 ML: at 21:01

## 2024-01-07 RX ADMIN — QUETIAPINE FUMARATE 25 MG: 25 TABLET, FILM COATED ORAL at 21:00

## 2024-01-07 RX ADMIN — ENOXAPARIN SODIUM 40 MG: 100 INJECTION SUBCUTANEOUS at 09:25

## 2024-01-07 RX ADMIN — IPRATROPIUM BROMIDE 2 PUFF: 17 AEROSOL, METERED RESPIRATORY (INHALATION) at 21:14

## 2024-01-07 RX ADMIN — ALBUTEROL SULFATE 2 PUFF: 90 AEROSOL, METERED RESPIRATORY (INHALATION) at 21:14

## 2024-01-07 RX ADMIN — IPRATROPIUM BROMIDE 2 PUFF: 17 AEROSOL, METERED RESPIRATORY (INHALATION) at 10:07

## 2024-01-07 RX ADMIN — GABAPENTIN 800 MG: 400 CAPSULE ORAL at 15:37

## 2024-01-07 RX ADMIN — SODIUM CHLORIDE 100 ML/HR: 9 INJECTION, SOLUTION INTRAVENOUS at 03:59

## 2024-01-07 RX ADMIN — ALBUTEROL SULFATE 2 PUFF: 90 AEROSOL, METERED RESPIRATORY (INHALATION) at 14:49

## 2024-01-07 NOTE — PLAN OF CARE
"Goal Outcome Evaluation:  Plan of Care Reviewed With: patient        Progress: no change  Outcome Evaluation: OT eval completed. Pt presents A&Ox4 with no c/o, motivated to participate. Pt standing in room upon entry. Pt reports PLOF of I to Mod I, but describes multiple recent falls secondary to B LE \"neuropathy\". Pt donned slippers with Supervision at EOB. Pt completed sit<>stand t/f and fxl mobility without AD, requiring SBA. Pt frequently seeking support from environment, educated on fall prevention. Pt completed grooming while standing sink-side with SBA. Pt c/o increased fatigue following activities. Pt demo impulsivity at times throughout eval. Pt O2 sats 98-99% on RA with activities. Skilled OT warranted to provide education and address cognition, safety awareness, balance, activity tolerance, sensation, and strength in order to increase pt safety and I during ADLs, fxl mobility, and fxl t/f's. Recommend D/C home with assist.      Anticipated Discharge Disposition (OT): home with assist  "

## 2024-01-07 NOTE — THERAPY EVALUATION
Patient Name: Frederic Palacios  : 1950    MRN: [id number]                              Today's Date: 2024       Admit Date: 2024    Visit Dx:     ICD-10-CM ICD-9-CM   1. Generalized weakness  R53.1 780.79   2. COVID  U07.1 079.89   3. Hypokalemia  E87.6 276.8   4. KAIT (acute kidney injury)  N17.9 584.9   5. Dehydration  E86.0 276.51     Patient Active Problem List   Diagnosis    Prostate cancer    History of radiation therapy    Erectile dysfunction    Family history of colon cancer    Non-smoker    Encounter for follow-up surveillance of prostate cancer    Heartburn    Hypertension    Generalized weakness    General weakness     Past Medical History:   Diagnosis Date    History of radiation therapy 2017    7560 cGy to prostate    Hypertension     Prostate cancer      Past Surgical History:   Procedure Laterality Date    COLONOSCOPY  2010    normal    COLONOSCOPY  2004    small polyp at 25 cm,otherwise normal colonoscopy    COLONOSCOPY N/A 2018    Procedure: COLONOSCOPY WITH ANESTHESIA;  Surgeon: Abhijeet Alonso DO;  Location: [facility] ENDOSCOPY;  Service:     ENDOSCOPY  2004    gastritis    PROSTATE BIOPSY        General Information       Row Name 24 1450 24 1320       OT Time and Intention    Document Type evaluation  Pt presents with fatigue, weakness. Dx: COVID-19, KAIT, dehydration. Hx includes: prostate CA, HTN.  -LR --  -LR    Mode of Treatment occupational therapy  -LR --  -LR      Row Name 24 1450 24 1320       General Information    Patient Profile Reviewed yes  -LR --  -LR    Prior Level of Function independent:;all household mobility;community mobility;transfer;ADL's;home management;cooking;cleaning;driving;shopping  pt reports multiple recent falls  -LR --    Existing Precautions/Restrictions fall  -LR --  -LR      Row Name 24 1450          Occupational Profile    Environmental Supports and Barriers (Occupational Profile) walk-in  shower, rollator, SPC  -LR       Row Name 01/07/24 1450          Living Environment    People in Home alone  -LR       Row Name 01/07/24 1450          Home Main Entrance    Number of Stairs, Main Entrance none  -LR       Row Name 01/07/24 1450          Stairs Within Home, Primary    Number of Stairs, Within Home, Primary none  -LR       Row Name 01/07/24 1450          Cognition    Orientation Status (Cognition) oriented x 4  -LR       Row Name 01/07/24 1450          Safety Issues, Functional Mobility    Safety Issues Affecting Function (Mobility) at risk behavior observed;awareness of need for assistance;impulsivity;insight into deficits/self-awareness;judgment;problem-solving;safety precaution awareness;safety precautions follow-through/compliance;sequencing abilities  -LR     Impairments Affecting Function (Mobility) balance;cognition;endurance/activity tolerance;sensation/sensory awareness;strength  -LR     Cognitive Impairments, Mobility Safety/Performance attention;awareness, need for assistance;impulsivity;insight into deficits/self-awareness;judgment;problem-solving/reasoning;safety precaution awareness;safety precaution follow-through;sequencing abilities  -LR               User Key  (r) = Recorded By, (t) = Taken By, (c) = Cosigned By      Initials Name Provider Type    LR Alexandrea Boo, OTR/L Occupational Therapist                     Mobility/ADL's       Row Name 01/07/24 1450          Bed Mobility    Comment, (Bed Mobility) sitting EOB  -LR       Row Name 01/07/24 1450          Transfers    Transfers sit-stand transfer;stand-sit transfer  -LR     Comment, (Transfers) impulsivity noted  -LR       Row Name 01/07/24 1450          Sit-Stand Transfer    Sit-Stand [place] (Transfers) standby assist  -LR       Row Name 01/07/24 1450          Stand-Sit Transfer    Stand-Sit [place] (Transfers) standby assist  -LR       Row Name 01/07/24 1450          Functional Mobility    Functional Mobility-  "Ind. Level standby assist;verbal cues required  -LR     Functional Mobility- Comment seeking support from environment at times  -LR       Row Name 01/07/24 1450          Activities of Daily Living    BADL Assessment/Intervention lower body dressing;grooming  -LR       Row Name 01/07/24 1450          Lower Body Dressing Assessment/Training    [place] Level (Lower Body Dressing) doff;don;shoes/slippers;supervision  -LR     Position (Lower Body Dressing) edge of bed sitting  -LR       Row Name 01/07/24 1450          Grooming Assessment/Training    [place] Level (Grooming) wash face, hands;standby assist  -LR     Position (Grooming) sink side;unsupported standing  -LR               User Key  (r) = Recorded By, (t) = Taken By, (c) = Cosigned By      Initials Name Provider Type    LR Alexandrea Boo OTR/L Occupational Therapist                   Obj/Interventions       Row Name 01/07/24 1450          Sensory Assessment (Somatosensory)    Sensory Assessment (Somatosensory) bilateral UE  -LR     Sensory Subjective Reports numbness  -LR     Sensory Assessment pt reports occasional numbness of B thumbs and B feet d/t \"neuropathy\"  -LR       Row Name 01/07/24 1450          Vision Assessment/Intervention    Visual Impairment/Limitations WFL  -LR       Row Name 01/07/24 1450          Range of Motion Comprehensive    General Range of Motion bilateral upper extremity ROM WFL  -LR       Row Name 01/07/24 1450          Strength Comprehensive (MMT)    General Manual Muscle Testing (MMT) Assessment upper extremity strength deficits identified  -LR     Comment, General Manual Muscle Testing (MMT) Assessment B UE 4+/5, except L proximal 4/5  -LR       Row Name 01/07/24 1450          Balance    Balance Assessment sitting static balance;sitting dynamic balance;standing static balance;standing dynamic balance  -LR     Static Sitting Balance independent  -LR     Dynamic Sitting Balance supervision  -LR     Position, Sitting " Balance unsupported;sitting edge of bed  -LR     Static Standing Balance standby assist;supervision  -LR     Dynamic Standing Balance standby assist  -LR     Position/Device Used, Standing Balance unsupported  -LR     Comment, Balance unsteadiness noted at times, seeking support from environment  -LR               User Key  (r) = Recorded By, (t) = Taken By, (c) = Cosigned By      Initials Name Provider Type    LR Alexandrea Boo, OTR/L Occupational Therapist                   Goals/Plan       Row Name 01/07/24 1450          Transfer Goal 1 (OT)    Activity/Assistive Device (Transfer Goal 1, OT) bed-to-chair/chair-to-bed;toilet;commode  -LR     [place] Level/Cues Needed (Transfer Goal 1, OT) independent  -LR     Time Frame (Transfer Goal 1, OT) long term goal (LTG);by discharge  -LR     Progress/Outcome (Transfer Goal 1, OT) new goal  -LR       Row Name 01/07/24 1450          Bathing Goal 1 (OT)    Activity/Device (Bathing Goal 1, OT) bathing skills, all  -LR     [place] Level/Cues Needed (Bathing Goal 1, OT) independent  -LR     Time Frame (Bathing Goal 1, OT) long term goal (LTG);by discharge  -LR     Progress/Outcomes (Bathing Goal 1, OT) new goal  -LR       Row Name 01/07/24 1450          Toileting Goal 1 (OT)    Activity/Device (Toileting Goal 1, OT) toileting skills, all;commode  -LR     [place] Level/Cues Needed (Toileting Goal 1, OT) independent  -LR     Time Frame (Toileting Goal 1, OT) long term goal (LTG);by discharge  -LR     Progress/Outcome (Toileting Goal 1, OT) new goal  -LR       Row Name 01/07/24 1450          Therapy Assessment/Plan (OT)    Planned Therapy Interventions (OT) activity tolerance training;adaptive equipment training;BADL retraining;cognitive/visual perception retraining;functional balance retraining;IADL retraining;occupation/activity based interventions;patient/caregiver education/training;ROM/therapeutic exercise;strengthening exercise;transfer/mobility  "retraining  -LR               User Key  (r) = Recorded By, (t) = Taken By, (c) = Cosigned By      Initials Name Provider Type    LR Alexandrea Boo, OTR/L Occupational Therapist                   Clinical Impression       Row Name 01/07/24 1450          Pain Assessment    Pretreatment Pain Rating 0/10 - no pain  -LR     Posttreatment Pain Rating 0/10 - no pain  -LR       Row Name 01/07/24 1450          Plan of Care Review    Plan of Care Reviewed With patient  -LR     Progress no change  -LR     Outcome Evaluation OT eval completed. Pt presents A&Ox4 with no c/o, motivated to participate. Pt standing in room upon entry. Pt reports PLOF of I to Mod I, but describes multiple recent falls secondary to B LE \"neuropathy\". Pt donned slippers with Supervision at EOB. Pt completed sit<>stand t/f and fxl mobility without AD, requiring SBA. Pt frequently seeking support from environment, educated on fall prevention. Pt completed grooming while standing sink-side with SBA. Pt c/o increased fatigue following activities. Pt demo impulsivity at times throughout eval. Pt O2 sats 98-99% on RA with activities. Skilled OT warranted to provide education and address cognition, safety awareness, balance, activity tolerance, sensation, and strength in order to increase pt safety and I during ADLs, fxl mobility, and fxl t/f's. Recommend D/C home with assist.  -LR       Row Name 01/07/24 1450          Therapy Assessment/Plan (OT)    Rehab Potential (OT) good, to achieve stated therapy goals  -LR     Criteria for Skilled Therapeutic Interventions Met (OT) yes;skilled treatment is necessary  -LR     Therapy Frequency (OT) 3 times/wk  -LR     Predicted Duration of Therapy Intervention (OT) until hospital D/C  -LR       Row Name 01/07/24 1450          Therapy Plan Review/Discharge Plan (OT)    Anticipated Discharge Disposition (OT) home with assist  -LR       Row Name 01/07/24 1450          Vital Signs    Pre SpO2 (%) 98  -LR     O2 " Delivery Pre Treatment room air  -LR     Intra SpO2 (%) 99  -LR     O2 Delivery Intra Treatment room air  -LR     Post SpO2 (%) 98  -LR     O2 Delivery Post Treatment room air  -LR     Pre Patient Position Standing  -LR     Intra Patient Position Standing  -LR     Post Patient Position Sitting  -LR       Row Name 01/07/24 6103          Positioning and Restraints    Pre-Treatment Position standing in room  -LR     Post Treatment Position bed  -LR     In Bed notified nsg;sitting EOB;call light within reach;encouraged to call for assist;with family/caregiver  -LR               User Key  (r) = Recorded By, (t) = Taken By, (c) = Cosigned By      Initials Name Provider Type    LR Alexandrea Boo, OTR/L Occupational Therapist                   Outcome Measures       Row Name 01/07/24 3772          How much help from another is currently needed...    Putting on and taking off regular lower body clothing? 3  -LR     Bathing (including washing, rinsing, and drying) 3  -LR     Toileting (which includes using toilet bed pan or urinal) 3  -LR     Putting on and taking off regular upper body clothing 4  -LR     Taking care of personal grooming (such as brushing teeth) 4  -LR     Eating meals 4  -LR     AM-PAC 6 Clicks Score (OT) 21  -LR       Row Name 01/07/24 0925          How much help from another person do you currently need...    Turning from your back to your side while in flat bed without using bedrails? 4  -DF     Moving from lying on back to sitting on the side of a flat bed without bedrails? 4  -DF     Moving to and from a bed to a chair (including a wheelchair)? 4  -DF     Standing up from a chair using your arms (e.g., wheelchair, bedside chair)? 4  -DF     Climbing 3-5 steps with a railing? 3  -DF     To walk in hospital room? 3  -DF     AM-PAC 6 Clicks Score (PT) 22  -DF     Highest Level of Mobility Goal 7 --> Walk 25 feet or more  -DF       Row Name 01/07/24 1011          Functional Assessment    Outcome  Measure Options AM-PAC 6 Clicks Daily Activity (OT)  -LR               User Key  (r) = Recorded By, (t) = Taken By, (c) = Cosigned By      Initials Name Provider Type     Elif Garces, RN Registered Nurse    Alexandrea Barber OTR/L Occupational Therapist                    Occupational Therapy Education       Title: PT OT SLP Therapies (In Progress)       Topic: Occupational Therapy (In Progress)       Point: ADL training (Done)       Description:   Instruct learner(s) on proper safety adaptation and remediation techniques during self care or transfers.   Instruct in proper use of assistive devices.                  Learning Progress Summary             Patient Acceptance, E,D, VU,NR by LR at 1/7/2024 1537                         Point: Home exercise program (Not Started)       Description:   Instruct learner(s) on appropriate technique for monitoring, assisting and/or progressing therapeutic exercises/activities.                  Learner Progress:  Not documented in this visit.              Point: Precautions (Done)       Description:   Instruct learner(s) on prescribed precautions during self-care and functional transfers.                  Learning Progress Summary             Patient Acceptance, E,D, VU,NR by LR at 1/7/2024 1537                         Point: Body mechanics (Done)       Description:   Instruct learner(s) on proper positioning and spine alignment during self-care, functional mobility activities and/or exercises.                  Learning Progress Summary             Patient Acceptance, E,D, VU,NR by LR at 1/7/2024 1537                                         User Key       Initials Effective Dates Name Provider Type [facility] 04/25/23 -  Alexandrea Boo OTR/L Occupational Therapist OT                  OT Recommendation and Plan  Planned Therapy Interventions (OT): activity tolerance training, adaptive equipment training, BADL retraining, cognitive/visual perception  "retraining, functional balance retraining, IADL retraining, occupation/activity based interventions, patient/caregiver education/training, ROM/therapeutic exercise, strengthening exercise, transfer/mobility retraining  Therapy Frequency (OT): 3 times/wk  Plan of Care Review  Plan of Care Reviewed With: patient  Progress: no change  Outcome Evaluation: OT eval completed. Pt presents A&Ox4 with no c/o, motivated to participate. Pt standing in room upon entry. Pt reports PLOF of I to Mod I, but describes multiple recent falls secondary to B LE \"neuropathy\". Pt donned slippers with Supervision at EOB. Pt completed sit<>stand t/f and fxl mobility without AD, requiring SBA. Pt frequently seeking support from environment, educated on fall prevention. Pt completed grooming while standing sink-side with SBA. Pt c/o increased fatigue following activities. Pt demo impulsivity at times throughout eval. Pt O2 sats 98-99% on RA with activities. Skilled OT warranted to provide education and address cognition, safety awareness, balance, activity tolerance, sensation, and strength in order to increase pt safety and I during ADLs, fxl mobility, and fxl t/f's. Recommend D/C home with assist.     Time Calculation:         Time Calculation- OT       Row Name 01/07/24 1450             Time Calculation- OT    OT Start Time 1450  +5 min chart review  -LR      OT Stop Time 1530  -LR      OT Time Calculation (min) 40 min  -LR      OT Received On 01/07/24  -LR      OT Goal Re-Cert Due Date 01/17/24  -LR         Untimed Charges    OT Eval/Re-eval Minutes 45  -LR         Total Minutes    Untimed Charges Total Minutes 45  -LR       Total Minutes 45  -LR                User Key  (r) = Recorded By, (t) = Taken By, (c) = Cosigned By      Initials Name Provider Type    Alexandrea Barber, OTR/L Occupational Therapist                  Therapy Charges for Today       Code Description Service Date Service Provider Modifiers Qty    [id number] HC OT " EVAL LOW COMPLEXITY 3 1/7/2024 Alexandrea Boo, OTR/L GO 1                 Alexandrea Boo, OTR/L  1/7/2024

## 2024-01-07 NOTE — H&P
History and Physical      CHIEF COMPLAINT: COVID-19    Reason for Admission: Generalized weakness    History Obtained From: Patient and chart    HISTORY OF PRESENT ILLNESS:      The patient is a 73 y.o. male with significant past medical history of prostate cancer, hypertension and GERD who presents with generalized weakness, positive for COVID-19 not requiring any oxygen.  Patient had sudden onset of nausea, vomiting and generalized weakness.  Presented to the emergency department due to dehydration and weakness.  Patient was unable to tolerate p.o. diet at home.  Patient was admitted for IV fluids and therapy.    Past Medical History:    Past Medical History:   Diagnosis Date    History of radiation therapy 01/03/2017    7560 cGy to prostate    Hypertension     Prostate cancer        Past Surgical History:    Past Surgical History:   Procedure Laterality Date    COLONOSCOPY  07/08/2010    normal    COLONOSCOPY  01/29/2004    small polyp at 25 cm,otherwise normal colonoscopy    COLONOSCOPY N/A 2/16/2018    Procedure: COLONOSCOPY WITH ANESTHESIA;  Surgeon: Abhijeet Alonso DO;  Location: [facility] ENDOSCOPY;  Service:     ENDOSCOPY  01/29/2004    gastritis    PROSTATE BIOPSY         Medications Prior to Admission:    Medications Prior to Admission   Medication Sig Dispense Refill Last Dose    albuterol sulfate  (90 Base) MCG/ACT inhaler Inhale 2 puffs Every 4 (Four) Hours As Needed for Wheezing. 18 g 11     gabapentin (NEURONTIN) 800 MG tablet Take 1 tablet by mouth 3 (Three) Times a Day. for 30 days.       losartan (COZAAR) 50 MG tablet Take 1 tablet by mouth Daily.       methadone (DOLOPHINE) 5 MG tablet Take 1 tablet by mouth Every 12 (Twelve) Hours As Needed.       naloxone (NARCAN) 4 MG/0.1ML nasal spray USE 1 SPRAY AS NEEDED (Patient not taking: Reported on 11/8/2023)       nortriptyline (PAMELOR) 10 MG capsule Take 1 capsule by mouth At Night As Needed.       tiotropium bromide monohydrate  (Spiriva Respimat) 2.5 MCG/ACT aerosol solution inhaler Inhale 2 puffs Daily. 4 g 11     triamterene-hydrochlorothiazide (DYAZIDE) 37.5-25 MG per capsule Take 1 capsule by mouth Every Morning.          Allergies:  Lyrica [pregabalin]    Social History:   Social History     Socioeconomic History    Marital status: Single   Tobacco Use    Smoking status: Never    Smokeless tobacco: Never   Vaping Use    Vaping Use: Never used   Substance and Sexual Activity    Alcohol use: No    Drug use: No    Sexual activity: Not Currently       Family History:   Family History   Problem Relation Age of Onset    COPD Father     Colon cancer Father     Colon cancer Maternal Grandmother        REVIEW OF SYSTEMS:    CONSTITUTIONAL:  Negative for anorexia, chills, fevers, night sweats and weight loss, patient has been relatively medically stable until illness outlined in HPI  EYES:  negative for eye dryness, icterus and redness, no significant changes in vision  HEENT:   negative for dental problems, epistaxis or sinus congestion , no history of facial trauma or difficulty swallowing  RESPIRATORY:  negative for chest tightness, cough, dyspnea on exertion, pneumonia or worse sputum production  CARDIOVASCULAR: No history of chest pain, dyspnea, exertional chest pressure/discomfort, irregular heart beat, or PND  GASTROINTESTINAL:  negative for abdominal pain, nausea or vomiting, no history of hematemesis, jaundice, melena or rectal bleeding  MUSCULOSKELETAL:  negative for muscle weakness, myalgias and neck pain, no significant arthralgias  NEUROLOGICAL:   negative for dizziness, headaches, seizures, speech problems, tremors and vertigo, mentation has been at baseline  INTEGUMENT: negative for pruritus, rash, skin color change and skin lesion(s)       Vital Signs   Temp:  [94.8 °F (34.9 °C)-98.2 °F (36.8 °C)] 97.1 °F (36.2 °C)  Heart Rate:  [72-83] 74  Resp:  [16-18] 18  BP: (134-158)/(60-98) 158/88          Physical Exam:  Constitutional:  The patient is oriented to person, place, and time. They appear well-developed.  Able to answer questions appropriately  HEENT: Grossly normal sitting up in bed  Head: Normocephalic and atraumatic.   Eyes: Pupils are equal, round, and reactive to light.  Extraocular muscles appear to be intact  Neck: Neck supple without masses or carotid bruit.  Cardiovascular: Regular rhythm and normal heart sounds.  No extra or lift rub or murmur appreciated  Pulmonary/Chest: Effort normal and breath sounds normal. CTAB, no appreciable rales or wheezes  Abdominal: Soft. Bowel sounds are normal. There is  no distension or tenderness appreciated. There is no rebound and no guarding.   Musculoskeletal: Normal range of motion. There is  no edema or tenderness.  No significant joint swelling  Neurological: Pt is alert and oriented to person, place, and time. They have normal reflexes. CN 2-12 appear grossly intact  Skin: Skin is warm and dry without significant rashes     Results Review:   I reviewed the patient's new imaging results and agree with the interpretation.    DATA:  Lab Results (last 24 hours)       Procedure Component Value Units Date/Time    CBC & Differential [[id number]]  (Abnormal) Collected: 01/07/24 0648    Specimen: Blood Updated: 01/07/24 0721    Narrative:      The following orders were created for panel order CBC & Differential.  Procedure                               Abnormality         Status                     ---------                               -----------         ------                     CBC Auto Differential[[id number]]        Abnormal            Final result                 Please view results for these tests on the individual orders.    CBC Auto Differential [[id number]]  (Abnormal) Collected: 01/07/24 0648    Specimen: Blood Updated: 01/07/24 0721     WBC 6.94 10*3/mm3      RBC 4.33 10*6/mm3      Hemoglobin 11.5 g/dL      Hematocrit 36.9 %      MCV 85.2 fL      MCH 26.6 pg      MCHC 31.2 g/dL       RDW 13.2 %      RDW-SD 41.2 fl      MPV 10.0 fL      Platelets 347 10*3/mm3      Neutrophil % 61.3 %      Lymphocyte % 16.6 %      Monocyte % 12.4 %      Eosinophil % 4.2 %      Basophil % 1.2 %      Immature Grans % 4.3 %      Neutrophils, Absolute 4.26 10*3/mm3      Lymphocytes, Absolute 1.15 10*3/mm3      Monocytes, Absolute 0.86 10*3/mm3      Eosinophils, Absolute 0.29 10*3/mm3      Basophils, Absolute 0.08 10*3/mm3      Immature Grans, Absolute 0.30 10*3/mm3      nRBC 0.0 /100 WBC     Basic Metabolic Panel [[id number]] Collected: 01/07/24 0648    Specimen: Blood Updated: 01/07/24 0715    High Sensitivity Troponin T 2Hr [[id number]]  (Normal) Collected: 01/06/24 1547    Specimen: Blood Updated: 01/06/24 1624     HS Troponin T 21 ng/L      Troponin T Delta 0 ng/L     Narrative:      High Sensitive Troponin T Reference Range:  <14.0 ng/L- Negative Female for AMI  <22.0 ng/L- Negative Male for AMI  >=14 - Abnormal Female indicating possible myocardial injury.  >=22 - Abnormal Male indicating possible myocardial injury.   Clinicians would have to utilize clinical acumen, EKG, Troponin, and serial changes to determine if it is an Acute Myocardial Infarction or myocardial injury due to an underlying chronic condition.         COVID PRE-OP / PRE-PROCEDURE SCREENING ORDER (NO ISOLATION) - Swab, Nasal Cavity [[id number]]  (Abnormal) Collected: 01/06/24 1351    Specimen: Swab from Nasal Cavity Updated: 01/06/24 1438    Narrative:      The following orders were created for panel order COVID PRE-OP / PRE-PROCEDURE SCREENING ORDER (NO ISOLATION) - Swab, Nasal Cavity.  Procedure                               Abnormality         Status                     ---------                               -----------         ------                     COVID-19 and FLU A/B PCR...[[id number]]  Abnormal            Final result                 Please view results for these tests on the individual orders.    COVID-19 and FLU A/B PCR, 1 HR TAT -  Swab, Nasopharynx [[id number]]  (Abnormal) Collected: 01/06/24 1351    Specimen: Swab from Nasopharynx Updated: 01/06/24 1438     COVID19 Detected     Influenza A PCR Not Detected     Influenza B PCR Not Detected    Narrative:      Fact sheet for providers: https://www.fda.gov/media/117589/download    Fact sheet for patients: https://www.fda.gov/media/943571/download    Test performed by PCR.    Comprehensive Metabolic Panel [[id number]]  (Abnormal) Collected: 01/06/24 1344    Specimen: Blood Updated: 01/06/24 1433     Glucose 121 mg/dL      BUN 42 mg/dL      Creatinine 2.25 mg/dL      Sodium 137 mmol/L      Potassium 3.2 mmol/L      Chloride 101 mmol/L      CO2 21.0 mmol/L      Calcium 8.7 mg/dL      Total Protein 7.6 g/dL      Albumin 3.7 g/dL      ALT (SGPT) 12 U/L      AST (SGOT) 16 U/L      Alkaline Phosphatase 100 U/L      Total Bilirubin 0.4 mg/dL      Globulin 3.9 gm/dL      A/G Ratio 0.9 g/dL      BUN/Creatinine Ratio 18.7     Anion Gap 15.0 mmol/L      eGFR 30.0 mL/min/1.73     Narrative:      GFR Normal >60  Chronic Kidney Disease <60  Kidney Failure <15    The GFR formula is only valid for adults with stable renal function between ages 18 and 70.    C-reactive Protein [[id number]]  (Abnormal) Collected: 01/06/24 1344    Specimen: Blood Updated: 01/06/24 1430     C-Reactive Protein 4.07 mg/dL     Magnesium [[id number]]  (Normal) Collected: 01/06/24 1344    Specimen: Blood Updated: 01/06/24 1429     Magnesium 2.0 mg/dL     High Sensitivity Troponin T [[id number]]  (Normal) Collected: 01/06/24 1344    Specimen: Blood Updated: 01/06/24 1426     HS Troponin T 21 ng/L     Narrative:      High Sensitive Troponin T Reference Range:  <14.0 ng/L- Negative Female for AMI  <22.0 ng/L- Negative Male for AMI  >=14 - Abnormal Female indicating possible myocardial injury.  >=22 - Abnormal Male indicating possible myocardial injury.   Clinicians would have to utilize clinical acumen, EKG, Troponin, and serial changes to  "determine if it is an Acute Myocardial Infarction or myocardial injury due to an underlying chronic condition.         Procalcitonin [[id number]]  (Abnormal) Collected: 01/06/24 1344    Specimen: Blood Updated: 01/06/24 1416     Procalcitonin 0.61 ng/mL     Narrative:      As a Marker for Sepsis (Non-Neonates):    1. <0.5 ng/mL represents a low risk of severe sepsis and/or septic shock.  2. >2 ng/mL represents a high risk of severe sepsis and/or septic shock.    As a Marker for Lower Respiratory Tract Infections that require antibiotic therapy:    PCT on Admission    Antibiotic Therapy       6-12 Hrs later    >0.5                Strongly Recommended  >0.25 - <0.5        Recommended   0.1 - 0.25          Discouraged              Remeasure/reassess PCT  <0.1                Strongly Discouraged     Remeasure/reassess PCT    As 28 day mortality risk marker: \"Change in Procalcitonin Result\" (>80% or <=80%) if Day 0 (or Day 1) and Day 4 values are available. Refer to http://www.[email or web address] [place]-pct-calculator.com    Change in PCT <=80%  A decrease of PCT levels below or equal to 80% defines a positive change in PCT test result representing a higher risk for 28-day all-cause mortality of patients diagnosed with severe sepsis for septic shock.    Change in PCT >80%  A decrease of PCT levels of more than 80% defines a negative change in PCT result representing a lower risk for 28-day all-cause mortality of patients diagnosed with severe sepsis or septic shock.       BNP [[id number]]  (Normal) Collected: 01/06/24 1344    Specimen: Blood Updated: 01/06/24 1409     proBNP 143.2 pg/mL     Narrative:      This assay is used as an aid in the diagnosis of individuals suspected of having heart failure. It can be used as an aid in the diagnosis of acute decompensated heart failure (ADHF) in patients presenting with signs and symptoms of ADHF to the emergency department (ED). In addition, NT-proBNP of <300 pg/mL indicates ADHF is not " "likely.    Age Range Result Interpretation  NT-proBNP Concentration (pg/mL:      <50             Positive            >450                   Gray                 300-450                    Negative             <300    50-75           Positive            >900                  Gray                300-900                  Negative            <300      >75             Positive            >1800                  Gray                300-1800                  Negative            <300    D-dimer, Quantitative [[id number]]  (Normal) Collected: 01/06/24 1344    Specimen: Blood Updated: 01/06/24 1403     D-Dimer, Quantitative 0.44 MCGFEU/mL     Narrative:      According to the assay 's published package insert, a normal (<0.50 MCGFEU/mL) D-dimer result in conjunction with a non-high clinical probability assessment, excludes deep vein thrombosis (DVT) and pulmonary embolism (PE) with high sensitivity.    D-dimer values increase with age and this can make VTE exclusion of an older population difficult. To address this, the [employer], based on best available evidence and recent guidelines, recommends that clinicians use age-adjusted D-dimer thresholds in patients greater than 50 years of age with: a) a low probability of PE who do not meet all Pulmonary Embolism Rule Out Criteria, or b) in those with intermediate probability of PE.   The formula for an age-adjusted D-dimer cut-off is \"age/100\".  For example, a 60 year old patient would have an age-adjusted cut-off of 0.60 MCGFEU/mL and an 80 year old 0.80 MCGFEU/mL.    CBC & Differential [[id number]]  (Abnormal) Collected: 01/06/24 1344    Specimen: Blood Updated: 01/06/24 1351    Narrative:      The following orders were created for panel order CBC & Differential.  Procedure                               Abnormality         Status                     ---------                               -----------         ------                     CBC Auto " Differential[[id number]]        Abnormal            Final result                 Please view results for these tests on the individual orders.    CBC Auto Differential [[id number]]  (Abnormal) Collected: 01/06/24 1344    Specimen: Blood Updated: 01/06/24 1351     WBC 7.51 10*3/mm3      RBC 4.76 10*6/mm3      Hemoglobin 12.8 g/dL      Hematocrit 39.9 %      MCV 83.8 fL      MCH 26.9 pg      MCHC 32.1 g/dL      RDW 13.1 %      RDW-SD 40.3 fl      MPV 9.7 fL      Platelets 384 10*3/mm3      Neutrophil % 64.4 %      Lymphocyte % 20.0 %      Monocyte % 8.7 %      Eosinophil % 2.4 %      Basophil % 0.9 %      Immature Grans % 3.6 %      Neutrophils, Absolute 4.84 10*3/mm3      Lymphocytes, Absolute 1.50 10*3/mm3      Monocytes, Absolute 0.65 10*3/mm3      Eosinophils, Absolute 0.18 10*3/mm3      Basophils, Absolute 0.07 10*3/mm3      Immature Grans, Absolute 0.27 10*3/mm3      nRBC 0.0 /100 WBC           Imaging Results (Last 24 Hours)       Procedure Component Value Units Date/Time    XR Chest 1 View [[id number]] Collected: 01/06/24 1409     Updated: 01/06/24 1414    Narrative:      XR CHEST 1 VW-     HISTORY: Weakness     COMPARISON: 1/9/2020     FINDINGS: Frontal view the chest obtained.     There are bilateral pulmonary opacities with perihilar prominence. Heart  appears normal in size. No pleural effusion or pneumothorax. No acute  regional bony pathology.       Impression:      1. Bilateral perihilar pulmonary opacities which may represent perihilar  edema or pneumonitis.        This report was signed and finalized on 1/6/2024 2:11 PM by Dr. Latesha Medellin MD.                 ASSESSMENT AND PLAN:         Generalized weakness    General weakness      1.  COVID-19   On room air, 96%   Chest x-ray does not reveal any consolidation, questionable pneumonitis   Patient generally weak and poor appetite    2.   Nausea and vomiting   As needed Zofran   Diet as tolerated    3.   Generalized weakness   Unable to walk in the ER,  consult PT and OT    4.   Hypertension   Resume home medications    Likely home tomorrow      Sathish Peoples ADRY Vincent  07:27 CST 1/7/2024       COVID-19-maintaining oxygen sats on room air.  Only risk factor is age.  Dehydration-not able to tolerate orals, symptomatic treatment gentle rehydration with IV fluids.  Unstable gait-PT OT.  Resume home meds and recheck labs in AM  Discharge home 1-2 days.

## 2024-01-07 NOTE — PLAN OF CARE
Goal Outcome Evaluation:  Plan of Care Reviewed With: patient        Progress: no change  Outcome Evaluation: Pt denies pain; IVF continue; voiding; room air; tolerating diet; safety maintained.

## 2024-01-07 NOTE — PLAN OF CARE
Goal Outcome Evaluation:  Plan of Care Reviewed With: patient  Progress: no change          Pt with no c/o pain this shift. IVF cont to infuse per orders. Up ad gabino. Voiding per BRP. Maintained on room air with sats in mid to high 90's. Prn sleep med ordered prn on call APRN. Resting well between care. VSS. Safety maintained.

## 2024-01-08 VITALS
WEIGHT: 175 LBS | BODY MASS INDEX: 26.52 KG/M2 | HEIGHT: 68 IN | RESPIRATION RATE: 16 BRPM | DIASTOLIC BLOOD PRESSURE: 65 MMHG | SYSTOLIC BLOOD PRESSURE: 142 MMHG | OXYGEN SATURATION: 98 % | TEMPERATURE: 98 F | HEART RATE: 74 BPM

## 2024-01-08 PROBLEM — U07.1 COVID-19: Status: ACTIVE | Noted: 2024-01-08

## 2024-01-08 PROBLEM — N17.9 AKI (ACUTE KIDNEY INJURY): Status: ACTIVE | Noted: 2024-01-08

## 2024-01-08 PROBLEM — E87.6 HYPOKALEMIA: Status: ACTIVE | Noted: 2024-01-08

## 2024-01-08 LAB
ANION GAP SERPL CALCULATED.3IONS-SCNC: 11 MMOL/L (ref 5–15)
BUN SERPL-MCNC: 26 MG/DL (ref 8–23)
BUN/CREAT SERPL: 19.1 (ref 7–25)
CALCIUM SPEC-SCNC: 8 MG/DL (ref 8.6–10.5)
CHLORIDE SERPL-SCNC: 109 MMOL/L (ref 98–107)
CO2 SERPL-SCNC: 22 MMOL/L (ref 22–29)
CREAT SERPL-MCNC: 1.36 MG/DL (ref 0.76–1.27)
EGFRCR SERPLBLD CKD-EPI 2021: 54.9 ML/MIN/1.73
GLUCOSE SERPL-MCNC: 106 MG/DL (ref 65–99)
POTASSIUM SERPL-SCNC: 3.6 MMOL/L (ref 3.5–5.2)
QT INTERVAL: 390 MS
QT INTERVAL: 396 MS
QTC INTERVAL: 444 MS
QTC INTERVAL: 445 MS
SODIUM SERPL-SCNC: 142 MMOL/L (ref 136–145)

## 2024-01-08 PROCEDURE — G0378 HOSPITAL OBSERVATION PER HR: HCPCS

## 2024-01-08 PROCEDURE — 96361 HYDRATE IV INFUSION ADD-ON: CPT

## 2024-01-08 PROCEDURE — 94664 DEMO&/EVAL PT USE INHALER: CPT

## 2024-01-08 PROCEDURE — 80048 BASIC METABOLIC PNL TOTAL CA: CPT | Performed by: NURSE PRACTITIONER

## 2024-01-08 PROCEDURE — 25810000003 SODIUM CHLORIDE 0.9 % SOLUTION: Performed by: NURSE PRACTITIONER

## 2024-01-08 PROCEDURE — 94799 UNLISTED PULMONARY SVC/PX: CPT

## 2024-01-08 PROCEDURE — 25010000002 ENOXAPARIN PER 10 MG: Performed by: NURSE PRACTITIONER

## 2024-01-08 RX ADMIN — ALBUTEROL SULFATE 2 PUFF: 90 AEROSOL, METERED RESPIRATORY (INHALATION) at 06:18

## 2024-01-08 RX ADMIN — SODIUM CHLORIDE 100 ML/HR: 9 INJECTION, SOLUTION INTRAVENOUS at 00:15

## 2024-01-08 RX ADMIN — IPRATROPIUM BROMIDE 2 PUFF: 17 AEROSOL, METERED RESPIRATORY (INHALATION) at 10:26

## 2024-01-08 RX ADMIN — TRIAMTERENE AND HYDROCHLOROTHIAZIDE 1 TABLET: 37.5; 25 TABLET ORAL at 09:41

## 2024-01-08 RX ADMIN — GABAPENTIN 800 MG: 400 CAPSULE ORAL at 09:41

## 2024-01-08 RX ADMIN — LOSARTAN POTASSIUM 50 MG: 50 TABLET, FILM COATED ORAL at 09:41

## 2024-01-08 RX ADMIN — IPRATROPIUM BROMIDE 2 PUFF: 17 AEROSOL, METERED RESPIRATORY (INHALATION) at 06:18

## 2024-01-08 RX ADMIN — ALBUTEROL SULFATE 2 PUFF: 90 AEROSOL, METERED RESPIRATORY (INHALATION) at 10:26

## 2024-01-08 RX ADMIN — Medication 10 ML: at 09:42

## 2024-01-08 NOTE — DISCHARGE SUMMARY
Hospital Discharge Summary    Frederic Palacios  :  1950  MRN:  [id number]    Admit date:  2024  Discharge date: 2024    Admitting Physician:    Evert Tong MD    Discharge Diagnoses:      Generalized weakness    General weakness    COVID-19    KAIT (acute kidney injury)    Hypokalemia      Hospital Course:   73-year-old gentleman who was diagnosed outpatient with COVID-19 and lives alone developed worsening symptoms of weakness causing him to present to the ER.  On presentation he was found to be severely dehydrated with KAIT as well as hypokalemia and was unable to ambulate in the ER safely.  He was admitted for IV fluids, electrolyte replacement, and physical therapy.  He showed gradual improvement throughout his stay and was back near his baseline after receiving IV fluids for 48 hours.  He will be discharged home in stable condition with follow-up in our office in 1 week.  He he need only mask now for 4 more days based on his timing of symptom onset as far as quarantine is concerned.    The patient was admitted for the above noted medical/surgical issues. Please see daily progress note for further details concerning their stay. The patient improved throughout their stay and reached maximum medical improvement on the day of discharge. The patient/family agree with the treatment plan as outlined above. All questions concerning their stay were answered prior to discharge. They understand the importance of follow up concerning any abnormal test results.     Physical Exam  Constitutional:       Appearance: He is well-developed.   HENT:      Head: Normocephalic and atraumatic.   Eyes:      Conjunctiva/sclera: Conjunctivae normal.      Pupils: Pupils are equal, round, and reactive to light.   Cardiovascular:      Rate and Rhythm: Normal rate and regular rhythm.      Heart sounds: Normal heart sounds. No murmur heard.     No friction rub.   Pulmonary:      Effort: Pulmonary effort is normal.  No respiratory distress.      Breath sounds: Normal breath sounds. No wheezing or rales.   Abdominal:      General: Bowel sounds are normal. There is no distension.      Palpations: Abdomen is soft.      Tenderness: There is no abdominal tenderness.   Musculoskeletal:         General: Normal range of motion.      Cervical back: Normal range of motion.   Skin:     Capillary Refill: Capillary refill takes less than 2 seconds.   Neurological:      Mental Status: He is alert and oriented to person, place, and time.      Cranial Nerves: No cranial nerve deficit.   Psychiatric:         Behavior: Behavior normal.           Discharge Medications:         Discharge Medications        Continue These Medications        Instructions Start Date   albuterol sulfate  (90 Base) MCG/ACT inhaler  Commonly known as: PROVENTIL HFA;VENTOLIN HFA;PROAIR HFA   2 puffs, Inhalation, Every 4 Hours PRN      gabapentin 800 MG tablet  Commonly known as: NEURONTIN   800 mg, Oral, 3 Times Daily, for 30 days.      losartan 50 MG tablet  Commonly known as: COZAAR   1 tablet, Oral, Daily      nortriptyline 10 MG capsule  Commonly known as: PAMELOR   1 capsule, Oral, Nightly PRN      Spiriva Respimat 2.5 MCG/ACT aerosol solution inhaler  Generic drug: tiotropium bromide monohydrate   2 puffs, Inhalation, Daily      triamterene-hydrochlorothiazide 37.5-25 MG per tablet  Commonly known as: MAXZIDE-25   1 capsule, Oral, Every Morning             Stop These Medications      methadone 5 MG tablet  Commonly known as: DOLOPHINE     naloxone 4 MG/0.1ML nasal spray  Commonly known as: NARCAN              Activity: Ad gabino.    Diet: As before    Consults: None    Significant Diagnostic Studies:    XR Chest 1 View    Result Date: 1/6/2024  1. Bilateral perihilar pulmonary opacities which may represent perihilar edema or pneumonitis.   This report was signed and finalized on 1/6/2024 2:11 PM by Dr. Latesha Medellin MD.            Treatments:   IV fluids,  potassium replacement    Disposition:   Home in stable condition    24 minutes time spent on discharge including discussion with patient/family, SW, and coordination of care.     Follow up with Evert Tong MD in 1 weeks.    Signed:  Evert Tong MD   1/8/2024, 07:46 CST c

## 2024-01-08 NOTE — THERAPY DISCHARGE NOTE
Acute Care - Occupational Therapy Discharge Summary  [facility]     Patient Name: Frederic Palacios  : 1950  MRN: [id number]    Today's Date: 2024                 Admit Date: 2024        OT Recommendation and Plan    Visit Dx:    ICD-10-CM ICD-9-CM   1. Generalized weakness  R53.1 780.79   2. COVID  U07.1 079.89   3. Hypokalemia  E87.6 276.8   4. KAIT (acute kidney injury)  N17.9 584.9   5. Dehydration  E86.0 276.51                OT Rehab Goals       Row Name 24 1300             Transfer Goal 1 (OT)    Activity/Assistive Device (Transfer Goal 1, OT) bed-to-chair/chair-to-bed;toilet;commode  -LS      [place] Level/Cues Needed (Transfer Goal 1, OT) independent  -LS      Time Frame (Transfer Goal 1, OT) long term goal (LTG);by discharge  -LS      Progress/Outcome (Transfer Goal 1, OT) goal not met  -LS         Bathing Goal 1 (OT)    Activity/Device (Bathing Goal 1, OT) bathing skills, all  -LS      [place] Level/Cues Needed (Bathing Goal 1, OT) independent  -LS      Time Frame (Bathing Goal 1, OT) long term goal (LTG);by discharge  -LS      Progress/Outcomes (Bathing Goal 1, OT) goal not met  -LS         Toileting Goal 1 (OT)    Activity/Device (Toileting Goal 1, OT) toileting skills, all;commode  -LS      [place] Level/Cues Needed (Toileting Goal 1, OT) independent  -LS      Time Frame (Toileting Goal 1, OT) long term goal (LTG);by discharge  -LS      Progress/Outcome (Toileting Goal 1, OT) goal not met  -LS                User Key  (r) = Recorded By, (t) = Taken By, (c) = Cosigned By      Initials Name Provider Type Discipline    Soledad Marquez COTA Occupational Therapist Assistant THERAPIES                                OT Discharge Summary  Anticipated Discharge Disposition (OT): home with assist  Reason for Discharge: Discharge from facility  Outcomes Achieved: Refer to plan of care for updates on goals achieved  Discharge Destination: Home with assist      Soledad Barrera  ROMO  1/8/2024

## 2024-01-08 NOTE — PLAN OF CARE
Pt stable at this time; he will not have a ride available until after 4pm; follow-up instructions provided verbal and written.

## 2024-01-08 NOTE — PLAN OF CARE
Goal Outcome Evaluation:  Plan of Care Reviewed With: patient        Progress: no change  Outcome Evaluation: Patient denies pain; IVF; up with SBA; Room air; Alert and oriented; resting between care; safety maintained

## 2024-03-22 ENCOUNTER — TELEPHONE (OUTPATIENT)
Dept: UROLOGY | Facility: CLINIC | Age: 74
End: 2024-03-22
Payer: MEDICARE

## 2024-03-22 DIAGNOSIS — J43.2 CENTRILOBULAR EMPHYSEMA: ICD-10-CM

## 2024-03-22 RX ORDER — TIOTROPIUM BROMIDE INHALATION SPRAY 3.12 UG/1
2 SPRAY, METERED RESPIRATORY (INHALATION) DAILY
Qty: 4 G | Refills: 11 | Status: SHIPPED | OUTPATIENT
Start: 2024-03-22

## 2024-03-22 NOTE — TELEPHONE ENCOUNTER
Rx Refill Note  Requested Prescriptions     Pending Prescriptions Disp Refills    Spiriva Respimat 2.5 MCG/ACT aerosol solution inhaler [Pharmacy Med Name: SPIRIVA RESPIMAT 2.5 MCG INH]  11     Sig: INHALE 2 PUFFS BY MOUTH DAILY      Last office visit with prescribing clinician: 9/6/2023   Last telemedicine visit with prescribing clinician: Visit date not found   Next office visit with prescribing clinician: 4/8/2024                         Would you like a call back once the refill request has been completed: [] Yes [] No    If the office needs to give you a call back, can they leave a voicemail: [] Yes [] No    Yessy Robles CMA  03/22/24, 14:10 CDT

## 2024-03-22 NOTE — TELEPHONE ENCOUNTER
Scheduled pt with [facility] and sent reminder     ----- Message from Elida Hector MA sent at 3/21/2024 10:40 AM CDT -----    ----- Message -----  From: Rikki Shields MD  Sent: 3/21/2024  10:37 AM CDT  To: Elida Hector MA    He can see I[facility].  ----- Message -----  From: Elida Hector MA  Sent: 3/21/2024  10:03 AM CDT  To: Rikki Shields MD    Patient was referred by PCP to [facility] for second opinion of the renal cyst we have seen him for. He is scheduled for his 6 mo follow up with you in April, are you okay with seeing patient?

## 2024-04-01 ENCOUNTER — APPOINTMENT (OUTPATIENT)
Dept: CT IMAGING | Facility: HOSPITAL | Age: 74
End: 2024-04-01
Payer: MEDICARE

## 2024-04-01 ENCOUNTER — HOSPITAL ENCOUNTER (OUTPATIENT)
Dept: CT IMAGING | Facility: HOSPITAL | Age: 74
Discharge: HOME | End: 2024-04-01
Payer: MEDICARE

## 2024-04-01 DIAGNOSIS — N28.1 COMPLEX RENAL CYST: ICD-10-CM

## 2024-04-01 DIAGNOSIS — R91.1 LEFT LOWER LOBE PULMONARY NODULE: ICD-10-CM

## 2024-04-01 LAB — CREAT BLDA-MCNC: 1.8 MG/DL (ref 0.6–1.3)

## 2024-04-01 PROCEDURE — 74178 CT ABD&PLV WO CNTR FLWD CNTR: CPT

## 2024-04-01 PROCEDURE — 82565 ASSAY OF CREATININE: CPT

## 2024-04-01 PROCEDURE — 71250 CT THORAX DX C-: CPT

## 2024-04-01 PROCEDURE — 25510000001 IOPAMIDOL PER 1 ML: Performed by: UROLOGY

## 2024-04-01 RX ADMIN — IOPAMIDOL 100 ML: 755 INJECTION, SOLUTION INTRAVENOUS at 10:01

## 2024-04-01 NOTE — PROGRESS NOTES
Let pt know this looked ok.  Basically stable from before.  Nothing else to do for now.  Keep follow up as planned

## 2024-04-08 ENCOUNTER — OFFICE VISIT (OUTPATIENT)
Dept: PULMONOLOGY | Facility: CLINIC | Age: 74
End: 2024-04-08
Payer: MEDICARE

## 2024-04-08 VITALS
OXYGEN SATURATION: 96 % | BODY MASS INDEX: 29.1 KG/M2 | WEIGHT: 192 LBS | HEART RATE: 74 BPM | HEIGHT: 68 IN | SYSTOLIC BLOOD PRESSURE: 126 MMHG | DIASTOLIC BLOOD PRESSURE: 82 MMHG

## 2024-04-08 DIAGNOSIS — J43.2 CENTRILOBULAR EMPHYSEMA: Primary | ICD-10-CM

## 2024-04-08 PROCEDURE — 94664 DEMO&/EVAL PT USE INHALER: CPT | Performed by: INTERNAL MEDICINE

## 2024-04-08 PROCEDURE — 99214 OFFICE O/P EST MOD 30 MIN: CPT | Performed by: INTERNAL MEDICINE

## 2024-04-08 RX ORDER — ROSUVASTATIN CALCIUM 5 MG/1
1 TABLET, COATED ORAL DAILY
COMMUNITY

## 2024-04-08 NOTE — PROGRESS NOTES
"Background:  Pt w dyspnea, abn dlco, emphysema.  AAT MS   Chief Complaint  Centrilobular emphysema    Subjective    History of Present Illness     Frederic Palacios is here for follow up with [facility].  History of Present Illness  He is using Spiriva.  He is still trying to work around his house.  He checked sat at home which was 88 or less on 3 occasions.  He is fatigued and tired.  He has neuropathy.       Tobacco Use: Low Risk  (4/8/2024)    Patient History     Smoking Tobacco Use: Never     Smokeless Tobacco Use: Never     Passive Exposure: Not on file      Current Outpatient Medications   Medication Instructions    albuterol sulfate  (90 Base) MCG/ACT inhaler 2 puffs, Inhalation, Every 4 Hours PRN    gabapentin (NEURONTIN) 800 mg, Oral, 3 Times Daily, for 30 days.    losartan (COZAAR) 50 MG tablet 1 tablet, Oral, Daily    nortriptyline (PAMELOR) 10 MG capsule 1 capsule, Oral, Nightly PRN    rosuvastatin (CRESTOR) 5 MG tablet 1 tablet, Oral, Daily    tiotropium bromide monohydrate (Spiriva Respimat) 2.5 MCG/ACT aerosol solution inhaler 2 puffs, Inhalation, Daily    triamterene-hydrochlorothiazide (MAXZIDE-25) 37.5-25 MG per tablet 1 capsule, Oral, Every Morning      Objective     Vital Signs:   /82   Pulse 74   Ht 172.7 cm (68\")   Wt 87.1 kg (192 lb)   SpO2 96% Comment: RA  BMI 29.19 kg/m²   Physical Exam  Constitutional:       Appearance: Normal appearance. He is not ill-appearing or diaphoretic.   Eyes:      Extraocular Movements: Extraocular movements intact.   Pulmonary:      Effort: Pulmonary effort is normal. No respiratory distress.      Breath sounds: No wheezing, rhonchi or rales.   Skin:     Findings: No erythema or rash.   Neurological:      Mental Status: He is alert.        Result Review  Data Reviewed:    CT Chest Without Contrast Diagnostic    Result Date: 4/1/2024  Impression: 1. A 2 mm subpleural left lower lobe nodule is " stable compared back to 4/12/2022 and considered benign. 2. A 5 mm nodule described on 4/12/2023 is not visible today. 3. Linear and dependent atelectasis in both lower lobes. Bleb or bullae in the lingula on the left. Mild bronchiectasis. 4. Mild atheromatous disease of the thoracic aorta and coronary arteries. Mild ectasia of the ascending thoracic aorta measuring 3.5 cm. 5. Liver cysts. Partially imaged probable left renal cyst.    This report was signed and finalized on 4/1/2024 12:32 PM by Dr. Justin Izquierdo MD.      CT Abdomen Pelvis With & Without Contrast    Result Date: 4/1/2024  Impression: 1. No significant change in the nodule of concern in the right kidney. No change in size or shape. Probably present a cyst with high proteinaceous contents or hemorrhagic/infected cyst. Another follow-up examination in 1 year may be obtained to ensure long-term stability. The remaining several lesions are stable since the previous study. 2. Stable low-density lesion in the liver. 3. Other nonacute findings stable since the previous study.         This report was signed and finalized on 4/1/2024 10:57 AM by Dr. Ashley Kimble MD.       PFT Values          3/6/2023    11:15   Pre Drug PFT Results   FVC 96   FEV1 98   FEF 25-75% 110   FEV1/FVC 77                Assessment and Plan    Diagnoses and all orders for this visit:    1. Centrilobular emphysema (Primary)  -     CT Chest Without Contrast Diagnostic; Future    Trial of airsupra.  He has white card. We demonstrated proper technique for hfa mdi.  If helpful we could get airsupra or Symbicort on white card  Will plan follow up ct in a year  6 minute walk test to assess oxygen need if any given desaturations noted at home    Follow Up   Return in about 6 months (around 10/8/2024) for Spirometry and DLCO.  Patient was given instructions and counseling regarding his condition or for health maintenance advice. Please see specific information pulled into the AVS if  appropriate.    Electronically signed by Ishaan Fraire MD, 4/8/2024, 19:52 CDT

## 2024-04-10 NOTE — PROGRESS NOTES
Subjective    Mr. Palacios is 74 y.o. male    Chief Complaint: renal mass follow up    History of Present Illness    Patient here for renal mass.  CT 9/23 showed indeterminate cystic lesion right posterior mid kidney.  CT done for vomiting.  Denies flank pain or gross hematuria.       AUA 14/35 with incomplete emptying, frequency, intermittency, urgency, weak stream, nocturia X 1. Patient previously saw Dr. Griffith  for low risk prostate cancer and underwent XRT completed in 2017. PSA followed by PCP. Las PSA to review in chart 2020 0.84.    The following portions of the patient's history were reviewed and updated as appropriate: allergies, current medications, past family history, past medical history, past social history, past surgical history and problem list.    Review of Systems   Constitutional:  Negative for chills and fever.   Gastrointestinal:  Negative for abdominal pain, anal bleeding, blood in stool, nausea and vomiting.   Genitourinary:  Positive for dysuria. Negative for hematuria.         Current Outpatient Medications:     albuterol sulfate  (90 Base) MCG/ACT inhaler, Inhale 2 puffs Every 4 (Four) Hours As Needed for Wheezing., Disp: 18 g, Rfl: 11    gabapentin (NEURONTIN) 800 MG tablet, Take 1 tablet by mouth 3 (Three) Times a Day. for 30 days., Disp: , Rfl:     losartan (COZAAR) 50 MG tablet, Take 1 tablet by mouth Daily., Disp: , Rfl:     meloxicam (MOBIC) 15 MG tablet, , Disp: , Rfl:     nortriptyline (PAMELOR) 10 MG capsule, Take 1 capsule by mouth At Night As Needed for Sleep., Disp: , Rfl:     rosuvastatin (CRESTOR) 5 MG tablet, Take 1 tablet by mouth Daily., Disp: , Rfl:     tiotropium bromide monohydrate (Spiriva Respimat) 2.5 MCG/ACT aerosol solution inhaler, INHALE 2 PUFFS BY MOUTH DAILY, Disp: 4 g, Rfl: 11    triamterene-hydrochlorothiazide (MAXZIDE-25) 37.5-25 MG per tablet, Take 1 tablet by mouth Every Morning., Disp: , Rfl:     Past Medical History:   Diagnosis Date    History of  "radiation therapy 01/03/2017    7560 cGy to prostate    Hypertension     Prostate cancer        Past Surgical History:   Procedure Laterality Date    COLONOSCOPY  07/08/2010    normal    COLONOSCOPY  01/29/2004    small polyp at 25 cm,otherwise normal colonoscopy    COLONOSCOPY N/A 2/16/2018    Procedure: COLONOSCOPY WITH ANESTHESIA;  Surgeon: Abhijeet Alonso DO;  Location: [facility] ENDOSCOPY;  Service:     ENDOSCOPY  01/29/2004    gastritis    PROSTATE BIOPSY         Social History     Socioeconomic History    Marital status: Single   Tobacco Use    Smoking status: Never    Smokeless tobacco: Never   Vaping Use    Vaping status: Never Used   Substance and Sexual Activity    Alcohol use: No    Drug use: No    Sexual activity: Not Currently       Family History   Problem Relation Age of Onset    COPD Father     Colon cancer Father     Colon cancer Maternal Grandmother        Objective    Temp 97.4 °F (36.3 °C)   Ht 172.7 cm (67.99\")   Wt 87.1 kg (192 lb)   BMI 29.20 kg/m²     Physical Exam  Constitutional:       Appearance: Normal appearance.   Abdominal:      Tenderness: There is no right CVA tenderness or left CVA tenderness.   Skin:     General: Skin is warm and dry.   Neurological:      Mental Status: He is alert and oriented to person, place, and time.   Psychiatric:         Mood and Affect: Mood normal.         Behavior: Behavior normal.             Results for orders placed or performed during the hospital encounter of 04/01/24   POC Creatinine    Specimen: Blood   Result Value Ref Range    Creatinine 1.80 (H) 0.60 - 1.30 mg/dL   CT Abdomen Pelvis With & Without Contrast (04/01/2024 10:01)   Assessment and Plan    Diagnoses and all orders for this visit:    1. Renal lesion (Primary)  -     CT Abdomen Pelvis With & Without Contrast; Future    2. Prostate CA  -     PSA DIAGNOSTIC; Future      Repeat CT at present no growth noted on right renal lesion. Components of proteinaceous/hemorrhagic cyst.  Recommend " continuing to follow 6-12 months with CT Urogram.      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  We discussed malignancy risk of 10 to 15%.  This lesion appears predominantly cystic to me and does not meet requirements for enhancement of 15 to 20 Hounsfield units.

## 2024-04-11 ENCOUNTER — OFFICE VISIT (OUTPATIENT)
Dept: UROLOGY | Facility: CLINIC | Age: 74
End: 2024-04-11
Payer: MEDICARE

## 2024-04-11 VITALS — WEIGHT: 192 LBS | BODY MASS INDEX: 29.1 KG/M2 | TEMPERATURE: 97.4 F | HEIGHT: 68 IN

## 2024-04-11 DIAGNOSIS — C61 PROSTATE CA: ICD-10-CM

## 2024-04-11 DIAGNOSIS — N28.9 RENAL LESION: Primary | ICD-10-CM

## 2024-04-11 RX ORDER — MELOXICAM 15 MG/1
TABLET ORAL
COMMUNITY
Start: 2024-04-08

## 2024-04-19 ENCOUNTER — OFFICE VISIT (OUTPATIENT)
Dept: PULMONOLOGY | Facility: CLINIC | Age: 74
End: 2024-04-19
Payer: OTHER MISCELLANEOUS

## 2024-04-19 DIAGNOSIS — J43.2 CENTRILOBULAR EMPHYSEMA: Primary | ICD-10-CM

## 2024-04-19 NOTE — PROGRESS NOTES
I was asked to evaluate patient after completion of 6 minute walk due to complaints of dizziness. Patient states dizziness is not new to him and this occurs often with exertion. Blood pressure, heart rate and oxygen saturation were stable. (See MA notes) No vision disturbances. Dizziness resolved after sitting and resting a few minutes. Education provided on seeking ER/pcp evaluation if profound dizziness recurred. He left in stable condition.

## 2024-04-19 NOTE — PROCEDURES
"6 Minute Walk Test    Performed by: Fred Jorge CMA  Authorized by: Ishaan Fraire MD    General:     - Medical History Checked      - Medical clearance provided for the patient to participate in exercise testing      Contraindications:    - No contraindications identified       Exercise Details     Supplemental oxygen:  NA    Mobility aid:  NA    Hallway: Total Feet:  654    Miles:  0.123    Meters:  199.34    Rest, Exercise, Recovery Readings    Rest     BP: 142/80    SAT: 98%    O2: RA    HR: 76    RPE: 0   Finish     BP: 140/80    SAT: 99%    O2: RA    HR: 78    RPE: 4   Recovery 1     BP: 132/80    SAT: 99%    O2: RA    HR: 78    RPE:  4   Recovery 2     BP:  136/76    SAT:  99%    O2: RA    HR:  76    RPE: 3    Minute by Minute Recordings    1st Minute     SAT: 98%    O2: RA    HR: 91    RPE:  2   2nd minute     SAT: 97%    O2: RA    HR: 96    RPE: 4   3rd minute     SAT: 97%    O2: RA    HR: 94    RPE: 4   4th minute     SAT: 98%    O2: RA    HR: 95    RPE: 4   5th minute     SAT: 98%    O2: RA    HR: 95    RPE: 5   6th minute     SAT: 98%    O2: RA    HR: 92    RPE: 4    Was test terminated?: No        Technician:  Fred CORCORAN       Overall notes:  Patient walked in the hallway for six minutes. His total distance walked was 654 feet or 199.34 meters. Patient's oxygen levels stayed between 99%-97%. He is aware he did not qualify for portable oxygen.    At the end of the test patient complained of \"dizziness\". Anali RIDER was advised of his complaints. See her note.    "

## 2024-05-20 NOTE — PROGRESS NOTES
"  Subjective        Frederic Palacios presents to [facility]    History of Present Illness  74-year-old male presents for follow-up.  Has been wearing compression gloves on his hands which have helped some.  Did not get nocturnal wrist splints.  Did go see pain management, who he reports started him on methadone.  However he was concerned because the pharmacy made him also  Narcan with it.      Past Medical History:   Diagnosis Date    History of radiation therapy 01/03/2017    7560 cGy to prostate    Hypertension     Prostate cancer           Current Outpatient Medications:     albuterol sulfate  (90 Base) MCG/ACT inhaler, Inhale 2 puffs Every 4 (Four) Hours As Needed for Wheezing., Disp: 18 g, Rfl: 11    gabapentin (NEURONTIN) 800 MG tablet, Take 1 tablet by mouth 3 (Three) Times a Day. for 30 days., Disp: , Rfl:     losartan (COZAAR) 50 MG tablet, Take 1 tablet by mouth Daily., Disp: , Rfl:     nortriptyline (PAMELOR) 10 MG capsule, Take 1 capsule by mouth At Night As Needed for Sleep., Disp: , Rfl:     rosuvastatin (CRESTOR) 5 MG tablet, Take 1 tablet by mouth Daily., Disp: , Rfl:     tiotropium bromide monohydrate (Spiriva Respimat) 2.5 MCG/ACT aerosol solution inhaler, INHALE 2 PUFFS BY MOUTH DAILY, Disp: 4 g, Rfl: 11    triamterene-hydrochlorothiazide (MAXZIDE-25) 37.5-25 MG per tablet, Take 1 tablet by mouth Every Morning., Disp: , Rfl:     meloxicam (MOBIC) 15 MG tablet, , Disp: , Rfl:        Objective   Vital Signs:   /72 (BP Location: Right arm, Patient Position: Sitting, Cuff Size: Adult)   Pulse 88   Resp 18   Ht 172.7 cm (67.99\")   Wt 86.6 kg (191 lb)   SpO2 98%   BMI 29.05 kg/m²     Physical Exam  Constitutional:       General: He is awake.   Eyes:      Extraocular Movements: Extraocular movements intact.   Neurological:      Mental Status: He is alert.   Psychiatric:         Speech: Speech normal.      Neurological Exam  Mental Status  Awake and " alert. Speech is normal. Language is fluent with no aphasia.    Cranial Nerves  CN III, IV, VI: Extraocular movements intact bilaterally.  CN VII: Full and symmetric facial movement.    Gait  Casual gait is normal including stance, stride, and arm swing.      Result Review :                     Assessment and Plan   74-year-old with progressive painful neuropathy.  EMG confirmed a sensorimotor polyneuropathy.  Lab work-up has been normal.  He did not have benefit on gabapentin or duloxetine previously.  Increasing Lyrica caused significant rash.  Now back on gabapentin.  No clear benefit.  Was sent to pain management but was concerned about the medication they prescribed him.  He likely also has carpal tunnel syndrome making him have increased symptoms in his hands, however this can be difficult with superimposed neuropathy.  I discussed with him that he needs nocturnal wrist splints and repeat EMG as last 1 was done in 2022.  Based on the severity and the benefit with Rissman's, we will likely refer him to Dr. Argueta.    Plan:    1.  Cock-up wrist splints for right wrist.  2.  EMG.  3.  Consider referral to Dr. Argueta based on EMG results.      Follow Up   No follow-ups on file.  Patient was given instructions and counseling regarding his condition or for health maintenance advice. Please see specific information pulled into the AVS if appropriate.

## 2024-05-21 ENCOUNTER — OFFICE VISIT (OUTPATIENT)
Dept: NEUROLOGY | Facility: CLINIC | Age: 74
End: 2024-05-21
Payer: MEDICARE

## 2024-05-21 VITALS
SYSTOLIC BLOOD PRESSURE: 130 MMHG | RESPIRATION RATE: 18 BRPM | HEIGHT: 68 IN | OXYGEN SATURATION: 98 % | BODY MASS INDEX: 28.95 KG/M2 | WEIGHT: 191 LBS | HEART RATE: 88 BPM | DIASTOLIC BLOOD PRESSURE: 72 MMHG

## 2024-05-21 DIAGNOSIS — G56.03 CARPAL TUNNEL SYNDROME, BILATERAL: ICD-10-CM

## 2024-05-21 DIAGNOSIS — R20.2 PARESTHESIA: Primary | ICD-10-CM

## 2024-05-21 PROCEDURE — 3075F SYST BP GE 130 - 139MM HG: CPT | Performed by: PSYCHIATRY & NEUROLOGY

## 2024-05-21 PROCEDURE — 3078F DIAST BP <80 MM HG: CPT | Performed by: PSYCHIATRY & NEUROLOGY

## 2024-05-21 PROCEDURE — 1160F RVW MEDS BY RX/DR IN RCRD: CPT | Performed by: PSYCHIATRY & NEUROLOGY

## 2024-05-21 PROCEDURE — 99214 OFFICE O/P EST MOD 30 MIN: CPT | Performed by: PSYCHIATRY & NEUROLOGY

## 2024-05-21 PROCEDURE — 1159F MED LIST DOCD IN RCRD: CPT | Performed by: PSYCHIATRY & NEUROLOGY

## 2024-07-30 ENCOUNTER — TRANSCRIBE ORDERS (OUTPATIENT)
Dept: ADMINISTRATIVE | Facility: HOSPITAL | Age: 74
End: 2024-07-30
Payer: MEDICARE

## 2024-07-30 DIAGNOSIS — R10.32 LEFT LOWER QUADRANT PAIN: Primary | ICD-10-CM

## 2024-08-13 ENCOUNTER — HOSPITAL ENCOUNTER (OUTPATIENT)
Dept: CT IMAGING | Facility: HOSPITAL | Age: 74
Discharge: HOME | End: 2024-08-13
Admitting: FAMILY MEDICINE
Payer: MEDICARE

## 2024-08-13 DIAGNOSIS — R10.32 LEFT LOWER QUADRANT PAIN: ICD-10-CM

## 2024-08-13 LAB — CREAT BLDA-MCNC: 1.9 MG/DL (ref 0.6–1.3)

## 2024-08-13 PROCEDURE — 25510000001 IOPAMIDOL 61 % SOLUTION: Performed by: FAMILY MEDICINE

## 2024-08-13 PROCEDURE — 74178 CT ABD&PLV WO CNTR FLWD CNTR: CPT

## 2024-08-13 PROCEDURE — 82565 ASSAY OF CREATININE: CPT

## 2024-08-13 RX ADMIN — IOPAMIDOL 100 ML: 612 INJECTION, SOLUTION INTRAVENOUS at 09:07

## 2024-08-19 ENCOUNTER — APPOINTMENT (OUTPATIENT)
Dept: CT IMAGING | Facility: HOSPITAL | Age: 74
End: 2024-08-19
Payer: MEDICARE

## 2024-08-19 ENCOUNTER — HOSPITAL ENCOUNTER (OUTPATIENT)
Facility: HOSPITAL | Age: 74
Setting detail: OBSERVATION
Discharge: HOME HEALTH | End: 2024-08-21
Attending: FAMILY MEDICINE | Admitting: FAMILY MEDICINE
Payer: MEDICARE

## 2024-08-19 DIAGNOSIS — S00.01XA ABRASION OF SCALP, INITIAL ENCOUNTER: ICD-10-CM

## 2024-08-19 DIAGNOSIS — M51.34 THORACIC DEGENERATIVE DISC DISEASE: ICD-10-CM
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
## 2024-08-19 DIAGNOSIS — S23.9XXA THORACIC SPRAIN: ICD-10-CM

## 2024-08-19 DIAGNOSIS — S01.01XA LACERATION OF SCALP, INITIAL ENCOUNTER: ICD-10-CM

## 2024-08-19 DIAGNOSIS — S20.221A CONTUSION OF RIGHT SIDE OF BACK, INITIAL ENCOUNTER: ICD-10-CM

## 2024-08-19 DIAGNOSIS — M51.36 LUMBAR DEGENERATIVE DISC DISEASE: ICD-10-CM

## 2024-08-19 DIAGNOSIS — S06.0X9A CONCUSSION WITH LOSS OF CONSCIOUSNESS, INITIAL ENCOUNTER: ICD-10-CM

## 2024-08-19 DIAGNOSIS — Z74.09 IMPAIRED MOBILITY: ICD-10-CM

## 2024-08-19 DIAGNOSIS — S13.9XXA NECK SPRAIN, INITIAL ENCOUNTER: ICD-10-CM

## 2024-08-19 DIAGNOSIS — S22.42XA CLOSED FRACTURE OF MULTIPLE RIBS OF LEFT SIDE, INITIAL ENCOUNTER: Primary | ICD-10-CM

## 2024-08-19 DIAGNOSIS — S06.0X1A CONCUSSION WITH LOSS OF CONSCIOUSNESS OF 30 MINUTES OR LESS, INITIAL ENCOUNTER: ICD-10-CM

## 2024-08-19 DIAGNOSIS — S33.5XXA LUMBAR SPRAIN, INITIAL ENCOUNTER: ICD-10-CM

## 2024-08-19 DIAGNOSIS — R52 INTRACTABLE PAIN: ICD-10-CM

## 2024-08-19 PROBLEM — S22.49XA RIB FRACTURES: Status: ACTIVE | Noted: 2024-08-19

## 2024-08-19 LAB
ABO GROUP BLD: NORMAL
ALBUMIN SERPL-MCNC: 3.9 G/DL (ref 3.5–5.2)
ALBUMIN/GLOB SERPL: 1.2 G/DL
ALP SERPL-CCNC: 82 U/L (ref 39–117)
ALT SERPL W P-5'-P-CCNC: 22 U/L (ref 1–41)
ANION GAP SERPL CALCULATED.3IONS-SCNC: 13 MMOL/L (ref 5–15)
AST SERPL-CCNC: 33 U/L (ref 1–40)
BASOPHILS # BLD AUTO: 0.06 10*3/MM3 (ref 0–0.2)
BASOPHILS NFR BLD AUTO: 0.8 % (ref 0–1.5)
BILIRUB SERPL-MCNC: 0.3 MG/DL (ref 0–1.2)
BLD GP AB SCN SERPL QL: NEGATIVE
BUN SERPL-MCNC: 32 MG/DL (ref 8–23)
BUN/CREAT SERPL: 14.5 (ref 7–25)
CALCIUM SPEC-SCNC: 9.2 MG/DL (ref 8.6–10.5)
CHLORIDE SERPL-SCNC: 109 MMOL/L (ref 98–107)
CO2 SERPL-SCNC: 22 MMOL/L (ref 22–29)
CREAT SERPL-MCNC: 2.2 MG/DL (ref 0.76–1.27)
DEPRECATED RDW RBC AUTO: 43.9 FL (ref 37–54)
EGFRCR SERPLBLD CKD-EPI 2021: 30.7 ML/MIN/1.73
EOSINOPHIL # BLD AUTO: 0.22 10*3/MM3 (ref 0–0.4)
EOSINOPHIL NFR BLD AUTO: 3 % (ref 0.3–6.2)
ERYTHROCYTE [DISTWIDTH] IN BLOOD BY AUTOMATED COUNT: 14.1 % (ref 12.3–15.4)
ETHANOL UR QL: <0.01 %
GLOBULIN UR ELPH-MCNC: 3.2 GM/DL
GLUCOSE SERPL-MCNC: 133 MG/DL (ref 65–99)
HCT VFR BLD AUTO: 37 % (ref 37.5–51)
HGB BLD-MCNC: 11.9 G/DL (ref 13–17.7)
HOLD SPECIMEN: NORMAL
HOLD SPECIMEN: NORMAL
IMM GRANULOCYTES # BLD AUTO: 0.07 10*3/MM3 (ref 0–0.05)
IMM GRANULOCYTES NFR BLD AUTO: 1 % (ref 0–0.5)
INR PPP: 0.92 (ref 0.91–1.09)
LIPASE SERPL-CCNC: 35 U/L (ref 13–60)
LYMPHOCYTES # BLD AUTO: 1.93 10*3/MM3 (ref 0.7–3.1)
LYMPHOCYTES NFR BLD AUTO: 26.4 % (ref 19.6–45.3)
MCH RBC QN AUTO: 27.5 PG (ref 26.6–33)
MCHC RBC AUTO-ENTMCNC: 32.2 G/DL (ref 31.5–35.7)
MCV RBC AUTO: 85.5 FL (ref 79–97)
MONOCYTES # BLD AUTO: 0.49 10*3/MM3 (ref 0.1–0.9)
MONOCYTES NFR BLD AUTO: 6.7 % (ref 5–12)
NEUTROPHILS NFR BLD AUTO: 4.54 10*3/MM3 (ref 1.7–7)
NEUTROPHILS NFR BLD AUTO: 62.1 % (ref 42.7–76)
NRBC BLD AUTO-RTO: 0 /100 WBC (ref 0–0.2)
PLATELET # BLD AUTO: 273 10*3/MM3 (ref 140–450)
PMV BLD AUTO: 10.6 FL (ref 6–12)
POTASSIUM SERPL-SCNC: 4.3 MMOL/L (ref 3.5–5.2)
PROT SERPL-MCNC: 7.1 G/DL (ref 6–8.5)
PROTHROMBIN TIME: 12.7 SECONDS (ref 11.8–14.8)
RBC # BLD AUTO: 4.33 10*6/MM3 (ref 4.14–5.8)
RH BLD: POSITIVE
SODIUM SERPL-SCNC: 144 MMOL/L (ref 136–145)
T&S EXPIRATION DATE: NORMAL
WBC NRBC COR # BLD AUTO: 7.31 10*3/MM3 (ref 3.4–10.8)
WHOLE BLOOD HOLD COAG: NORMAL
WHOLE BLOOD HOLD SPECIMEN: NORMAL

## 2024-08-19 PROCEDURE — 72128 CT CHEST SPINE W/O DYE: CPT

## 2024-08-19 PROCEDURE — 72131 CT LUMBAR SPINE W/O DYE: CPT

## 2024-08-19 PROCEDURE — G0378 HOSPITAL OBSERVATION PER HR: HCPCS

## 2024-08-19 PROCEDURE — 25510000001 IOPAMIDOL 61 % SOLUTION: Performed by: FAMILY MEDICINE

## 2024-08-19 PROCEDURE — 96374 THER/PROPH/DIAG INJ IV PUSH: CPT

## 2024-08-19 PROCEDURE — 86900 BLOOD TYPING SEROLOGIC ABO: CPT | Performed by: FAMILY MEDICINE

## 2024-08-19 PROCEDURE — 85610 PROTHROMBIN TIME: CPT | Performed by: FAMILY MEDICINE

## 2024-08-19 PROCEDURE — 72125 CT NECK SPINE W/O DYE: CPT

## 2024-08-19 PROCEDURE — 70450 CT HEAD/BRAIN W/O DYE: CPT

## 2024-08-19 PROCEDURE — 86850 RBC ANTIBODY SCREEN: CPT | Performed by: FAMILY MEDICINE

## 2024-08-19 PROCEDURE — 80053 COMPREHEN METABOLIC PANEL: CPT | Performed by: FAMILY MEDICINE

## 2024-08-19 PROCEDURE — 96375 TX/PRO/DX INJ NEW DRUG ADDON: CPT

## 2024-08-19 PROCEDURE — 25010000002 ONDANSETRON PER 1 MG: Performed by: FAMILY MEDICINE

## 2024-08-19 PROCEDURE — 96376 TX/PRO/DX INJ SAME DRUG ADON: CPT

## 2024-08-19 PROCEDURE — 85025 COMPLETE CBC W/AUTO DIFF WBC: CPT | Performed by: FAMILY MEDICINE

## 2024-08-19 PROCEDURE — 0 HYDROMORPHONE 1 MG/ML SOLUTION: Performed by: FAMILY MEDICINE

## 2024-08-19 PROCEDURE — 83690 ASSAY OF LIPASE: CPT | Performed by: FAMILY MEDICINE

## 2024-08-19 PROCEDURE — 99285 EMERGENCY DEPT VISIT HI MDM: CPT

## 2024-08-19 PROCEDURE — 74177 CT ABD & PELVIS W/CONTRAST: CPT

## 2024-08-19 PROCEDURE — 71260 CT THORAX DX C+: CPT

## 2024-08-19 PROCEDURE — 82077 ASSAY SPEC XCP UR&BREATH IA: CPT | Performed by: FAMILY MEDICINE

## 2024-08-19 PROCEDURE — 94799 UNLISTED PULMONARY SVC/PX: CPT

## 2024-08-19 PROCEDURE — 86901 BLOOD TYPING SEROLOGIC RH(D): CPT | Performed by: FAMILY MEDICINE

## 2024-08-19 PROCEDURE — 94760 N-INVAS EAR/PLS OXIMETRY 1: CPT

## 2024-08-19 RX ORDER — TRIAMTERENE/HYDROCHLOROTHIAZID 37.5-25 MG
1 TABLET ORAL DAILY
Status: DISCONTINUED | OUTPATIENT
Start: 2024-08-20 | End: 2024-08-20

## 2024-08-19 RX ORDER — ONDANSETRON 2 MG/ML
4 INJECTION INTRAMUSCULAR; INTRAVENOUS ONCE
Status: COMPLETED | OUTPATIENT
Start: 2024-08-19 | End: 2024-08-19

## 2024-08-19 RX ORDER — ACETAMINOPHEN 160 MG/5ML
650 SOLUTION ORAL EVERY 4 HOURS PRN
Status: DISCONTINUED | OUTPATIENT
Start: 2024-08-19 | End: 2024-08-21 | Stop reason: HOSPADM

## 2024-08-19 RX ORDER — OXYCODONE HYDROCHLORIDE 5 MG/1
5 TABLET ORAL EVERY 6 HOURS PRN
Status: DISCONTINUED | OUTPATIENT
Start: 2024-08-19 | End: 2024-08-21 | Stop reason: HOSPADM

## 2024-08-19 RX ORDER — SODIUM CHLORIDE 0.9 % (FLUSH) 0.9 %
10 SYRINGE (ML) INJECTION EVERY 12 HOURS SCHEDULED
Status: DISCONTINUED | OUTPATIENT
Start: 2024-08-19 | End: 2024-08-21 | Stop reason: HOSPADM

## 2024-08-19 RX ORDER — NORTRIPTYLINE HCL 10 MG
10 CAPSULE ORAL NIGHTLY PRN
Status: DISCONTINUED | OUTPATIENT
Start: 2024-08-19 | End: 2024-08-21 | Stop reason: HOSPADM

## 2024-08-19 RX ORDER — ACETAMINOPHEN 650 MG/1
650 SUPPOSITORY RECTAL EVERY 4 HOURS PRN
Status: DISCONTINUED | OUTPATIENT
Start: 2024-08-19 | End: 2024-08-21 | Stop reason: HOSPADM

## 2024-08-19 RX ORDER — IOPAMIDOL 612 MG/ML
100 INJECTION, SOLUTION INTRAVASCULAR
Status: COMPLETED | OUTPATIENT
Start: 2024-08-19 | End: 2024-08-19

## 2024-08-19 RX ORDER — GABAPENTIN 300 MG/1
300 CAPSULE ORAL EVERY 8 HOURS SCHEDULED
Status: DISCONTINUED | OUTPATIENT
Start: 2024-08-19 | End: 2024-08-21 | Stop reason: HOSPADM

## 2024-08-19 RX ORDER — BISACODYL 10 MG
10 SUPPOSITORY, RECTAL RECTAL DAILY PRN
Status: DISCONTINUED | OUTPATIENT
Start: 2024-08-19 | End: 2024-08-21 | Stop reason: HOSPADM

## 2024-08-19 RX ORDER — SODIUM CHLORIDE 0.9 % (FLUSH) 0.9 %
10 SYRINGE (ML) INJECTION AS NEEDED
Status: DISCONTINUED | OUTPATIENT
Start: 2024-08-19 | End: 2024-08-19

## 2024-08-19 RX ORDER — SODIUM CHLORIDE 0.9 % (FLUSH) 0.9 %
10 SYRINGE (ML) INJECTION AS NEEDED
Status: DISCONTINUED | OUTPATIENT
Start: 2024-08-19 | End: 2024-08-21 | Stop reason: HOSPADM

## 2024-08-19 RX ORDER — AMOXICILLIN 250 MG
2 CAPSULE ORAL 2 TIMES DAILY
Status: DISCONTINUED | OUTPATIENT
Start: 2024-08-19 | End: 2024-08-21 | Stop reason: HOSPADM

## 2024-08-19 RX ORDER — POLYETHYLENE GLYCOL 3350 17 G/17G
17 POWDER, FOR SOLUTION ORAL DAILY PRN
Status: DISCONTINUED | OUTPATIENT
Start: 2024-08-19 | End: 2024-08-21 | Stop reason: HOSPADM

## 2024-08-19 RX ORDER — ACETAMINOPHEN 325 MG/1
650 TABLET ORAL EVERY 4 HOURS PRN
Status: DISCONTINUED | OUTPATIENT
Start: 2024-08-19 | End: 2024-08-21 | Stop reason: HOSPADM

## 2024-08-19 RX ORDER — ALBUTEROL SULFATE 0.83 MG/ML
2.5 SOLUTION RESPIRATORY (INHALATION) EVERY 4 HOURS PRN
Status: DISCONTINUED | OUTPATIENT
Start: 2024-08-19 | End: 2024-08-21 | Stop reason: HOSPADM

## 2024-08-19 RX ORDER — ROSUVASTATIN CALCIUM 5 MG/1
5 TABLET, COATED ORAL NIGHTLY
Status: DISCONTINUED | OUTPATIENT
Start: 2024-08-19 | End: 2024-08-21 | Stop reason: HOSPADM

## 2024-08-19 RX ORDER — BISACODYL 5 MG/1
5 TABLET, DELAYED RELEASE ORAL DAILY PRN
Status: DISCONTINUED | OUTPATIENT
Start: 2024-08-19 | End: 2024-08-21 | Stop reason: HOSPADM

## 2024-08-19 RX ORDER — SODIUM CHLORIDE 9 MG/ML
40 INJECTION, SOLUTION INTRAVENOUS AS NEEDED
Status: DISCONTINUED | OUTPATIENT
Start: 2024-08-19 | End: 2024-08-21 | Stop reason: HOSPADM

## 2024-08-19 RX ORDER — LOSARTAN POTASSIUM 50 MG/1
50 TABLET ORAL DAILY
Status: DISCONTINUED | OUTPATIENT
Start: 2024-08-19 | End: 2024-08-21 | Stop reason: HOSPADM

## 2024-08-19 RX ADMIN — OXYCODONE HYDROCHLORIDE 5 MG: 5 TABLET ORAL at 18:41

## 2024-08-19 RX ADMIN — ONDANSETRON 4 MG: 2 INJECTION INTRAMUSCULAR; INTRAVENOUS at 13:21

## 2024-08-19 RX ADMIN — HYDROMORPHONE HYDROCHLORIDE 1 MG: 1 INJECTION, SOLUTION INTRAMUSCULAR; INTRAVENOUS; SUBCUTANEOUS at 13:21

## 2024-08-19 RX ADMIN — ROSUVASTATIN CALCIUM 5 MG: 5 TABLET, FILM COATED ORAL at 20:05

## 2024-08-19 RX ADMIN — ONDANSETRON 4 MG: 2 INJECTION INTRAMUSCULAR; INTRAVENOUS at 12:34

## 2024-08-19 RX ADMIN — ONDANSETRON 4 MG: 2 INJECTION INTRAMUSCULAR; INTRAVENOUS at 16:25

## 2024-08-19 RX ADMIN — LOSARTAN POTASSIUM 50 MG: 50 TABLET, FILM COATED ORAL at 20:05

## 2024-08-19 RX ADMIN — GABAPENTIN 300 MG: 300 CAPSULE ORAL at 22:02

## 2024-08-19 RX ADMIN — DOCUSATE SODIUM 50 MG AND SENNOSIDES 8.6 MG 2 TABLET: 8.6; 5 TABLET, FILM COATED ORAL at 20:05

## 2024-08-19 RX ADMIN — Medication 10 ML: at 20:06

## 2024-08-19 RX ADMIN — ACETAMINOPHEN 650 MG: 325 TABLET ORAL at 22:02

## 2024-08-19 RX ADMIN — IOPAMIDOL 100 ML: 612 INJECTION, SOLUTION INTRAVENOUS at 14:23

## 2024-08-19 RX ADMIN — HYDROMORPHONE HYDROCHLORIDE 1 MG: 1 INJECTION, SOLUTION INTRAMUSCULAR; INTRAVENOUS; SUBCUTANEOUS at 12:34

## 2024-08-19 NOTE — ED PROVIDER NOTES
"HPI:    Patient is a 74-year-old white male who was on a ladder 10 feet high when the ladder slipped and he fell to the ground.  Patient states he can remember falling but did lose consciousness.  Felt like he fell more on the right side.  Patient states he is having difficulty taking a deep breath but has severe low to mid back pain.  A passerby saw the patient laying in the yard and called EMS.  Upon EMS arrival they noted that he had good air entry in both lungs but was having difficulty taking a deep breath.  Felt more comfortable also with his knees bent especially his right knee.  Patient was placed in a cervical collar and on a board and brought here to the emergency room.  Patient was complaining about the difficulty breathing and thus was placed on a nonrebreather.  No reports of hypoxia was noted and route.  Patient's pain 10 out of 10.  Patient denies any head pain.  States the worsening of his pain is in his lung with breathing on the right side as well as in his mid to low back.  There is no numbness in his pelvis.  He states that both of his lower extremities are \"fine and is able to move them without difficulty\".  Patient also able to move all of his upper extremities but it hurts to move on the right side due to the movement of the right sided rib cage and back.      REVIEW OF SYSTEMS    CONSTITUTIONAL:  No complaints of fever, chills,or weakness  EYES:  No complaints of discharge   ENT: No complaints of sore throat or ear pain  CARDIOVASCULAR:  No complaints of chest pain, palpitations, or swelling  RESPIRATORY: Positive for painful respirations right sided and right sided trunk pain.    GI:  No complaints of abdominal pain, nausea, vomiting, or diarrhea  MUSCULOSKELETAL: Positive for severe mid to low back pain.    SKIN:  No complaints of rash  NEUROLOGIC:  No complaints of headache, focal weakness, or sensory changes  ENDOCRINE:  No complaints of polyuria or polydipsia  LYMPHATIC:  No complaints " of swollen glands  GENITOURINARY: No complaints of urinary frequency or hematuria        PAST MEDICAL HISTORY  Past Medical History:   Diagnosis Date    History of radiation therapy 01/03/2017    7560 cGy to prostate    Hypertension     Prostate cancer        FAMILY HISTORY  Family History   Problem Relation Age of Onset    COPD Father     Colon cancer Father     Colon cancer Maternal Grandmother        SOCIAL HISTORY  Social History     Socioeconomic History    Marital status: Single   Tobacco Use    Smoking status: Never    Smokeless tobacco: Never   Vaping Use    Vaping status: Never Used   Substance and Sexual Activity    Alcohol use: No    Drug use: No    Sexual activity: Not Currently       IMMUNIZATION HISTORY  Deferred to primary care physician.    SURGICAL HISTORY  Past Surgical History:   Procedure Laterality Date    COLONOSCOPY  07/08/2010    normal    COLONOSCOPY  01/29/2004    small polyp at 25 cm,otherwise normal colonoscopy    COLONOSCOPY N/A 2/16/2018    Procedure: COLONOSCOPY WITH ANESTHESIA;  Surgeon: Abhijeet Alonso DO;  Location: [facility] ENDOSCOPY;  Service:     ENDOSCOPY  01/29/2004    gastritis    PROSTATE BIOPSY         CURRENT MEDICATIONS    Current Facility-Administered Medications:     sodium chloride 0.9 % flush 10 mL, 10 mL, Intravenous, PRN, Tom Krishna Jr., MD    Current Outpatient Medications:     albuterol sulfate  (90 Base) MCG/ACT inhaler, Inhale 2 puffs Every 4 (Four) Hours As Needed for Wheezing., Disp: 18 g, Rfl: 11    gabapentin (NEURONTIN) 800 MG tablet, Take 1 tablet by mouth 3 (Three) Times a Day. for 30 days., Disp: , Rfl:     losartan (COZAAR) 50 MG tablet, Take 1 tablet by mouth Daily., Disp: , Rfl:     meloxicam (MOBIC) 15 MG tablet, , Disp: , Rfl:     nortriptyline (PAMELOR) 10 MG capsule, Take 1 capsule by mouth At Night As Needed for Sleep., Disp: , Rfl:     rosuvastatin (CRESTOR) 5 MG tablet, Take 1 tablet by mouth Daily., Disp: , Rfl:     tiotropium  "bromide monohydrate (Spiriva Respimat) 2.5 MCG/ACT aerosol solution inhaler, INHALE 2 PUFFS BY MOUTH DAILY, Disp: 4 g, Rfl: 11    triamterene-hydrochlorothiazide (MAXZIDE-25) 37.5-25 MG per tablet, Take 1 tablet by mouth Every Morning., Disp: , Rfl:     ALLERGIES  Allergies   Allergen Reactions    Lyrica [Pregabalin] Other (See Comments)     Felt like skin was coming off       Musculoskeletal exam    VITAL SIGNS:   /71   Pulse 75   Temp 96.9 °F (36.1 °C) (Axillary)   Resp 28   Ht 172.7 cm (67.99\")   Wt 86.6 kg (191 lb)   SpO2 100%   BMI 29.05 kg/m²     Constitutional: Patient is alert and in no distress.  Patient with severe upper, mid, low back, right sided ribs, lung, and neck discomfort.    Eyes: EOMI and PERRLA intact.    ENT: There is a normal pharynx with no acute erythema or exudate and oral mucosa is moist.  Nose is clear with no drainage.  Tympanic membranes intact and nonerythemic    Cardiovascular: S1-S2 regular rate and rhythm. No murmurs, rubs or gallops are noted.    Respiratory: Patient is clear to auscultation bilaterally with no wheezing or rhonchi.  Chest wall is tender to palpation over the right lateral and posterior rib cage..  There are no external lesions on the chest.  There is no crepitance    Abdomen: Soft, Bowel sounds are normal in all 4 quadrants. There is no rebound or guarding noted.  There is no abdominal distention or hepatosplenomegaly.  There is mild right upper quadrant tenderness of the abdomen.    Neck: Hard cervical collar in place.  There is mild posterior cervical tenderness no obvious step-off or deformity.    Back: Tenderness with bilateral flank and midline of the thoracic lumbar spines particularly more to the right.    Musculoskeletal: No tenderness to palpation of the upper 2 extremities and there is no tenderness to palpation of the bilateral lower extremities    Integumentary: Scalp abrasion    Genitourinary: Patient is voiding " appropriately.    Psychiatric: Normal affect and mood      RADIOLOGY/PROCEDURES        CT Head Without Contrast   Final Result   Mild cerebral and cerebellar atrophy with chronic microvascular disease   but no evidence of acute intracranial process.               This report was signed and finalized on 8/19/2024 2:36 PM by Dr. Hakan Guzman MD.          CT Cervical Spine Without Contrast   Final Result   1. No evidence of acute osseous injury or malalignment in the cervical   spine.    2. Central and foraminal stenosis at multiple levels in the cervical   spine as described above.               This report was signed and finalized on 8/19/2024 2:39 PM by Dr. Hakan Guzman MD.          CT Thoracic Spine Without Contrast   Final Result   .   1. No evidence of acute fracture or listhesis.   2. Degenerative disc disease and spondylosis in the mid and upper   thoracic spine with an accentuated thoracic kyphosis.           This report was signed and finalized on 8/19/2024 2:42 PM by Dr. Hakan Guzman MD.          CT Lumbar Spine Without Contrast   Final Result       1.  Degenerative changes in the lumbar spine but no definite acute   osseous abnormality. Please see the report above for details regarding   thecal sac and foraminal narrowing.           This report was signed and finalized on 8/19/2024 2:45 PM by Dr. Rj Freedman MD.          CT Chest With Contrast Diagnostic   Final Result   1. Bibasilar atelectasis. Lungs are otherwise clear with no evidence of   pulmonary contusion or pneumothorax. No acute infiltrate. No evidence of   mediastinal hematoma.   2. Nondisplaced fractures of the right anterolateral fifth and sixth   ribs. There is spondylosis in the mid and upper thoracic spine with an   accentuated thoracic kyphosis. No paraspinal hematoma demonstrated.   3. Cortical cysts of the upper pole of the kidneys. There is a hepatic   cyst within the left lobe of the liver. A small hiatal hernia  is   present..               This report was signed and finalized on 8/19/2024 2:51 PM by Dr. Hakan Guzman MD.          CT Abdomen Pelvis With Contrast   Final Result   1. There are nondisplaced rib fractures on the right. There is   atelectasis in both lung bases. There is no evidence of pneumothorax.   2. No solid organ injury or hemoperitoneum is seen.   3. Liver and renal cysts. There are probable hyperdense hemorrhagic or   proteinaceous cyst on the right side that appears stable in size   compared back to 9/23/2023. Enhancing lesions are not fully excluded on   this exam as no noncontrast exam was obtained. Nonemergent follow-up   ultrasound could be performed to evaluate the hyperdense right renal   lesions further.   4. Degenerative changes of the visualized spine and hips. Atheromatous   disease of the aortoiliac vessels.           The full report of this exam was immediately signed and available to the   emergency room. The patient is currently in the emergency room.       This report was signed and finalized on 8/19/2024 3:02 PM by Dr. Justin Izquierdo MD.                 FUTURE APPOINTMENTS     Future Appointments   Date Time Provider Department Center   8/26/2024 12:30 PM Kai Godfrey APRN [facility] GE PAD PAD   8/28/2024  8:00 AM [facility] PAD EMG (OP) NEURO RM 2 (DIAL) [facility] PAD DESIRE PAD   10/4/2024  9:00 AM PAD CT 2 [facility] PAD CAT PAD   10/4/2024  9:30 AM PAD CT 2 [facility] PAD CAT PAD   10/9/2024 10:30 AM THERAPIST RESPIRATORY DI PAD [facility] PAD PAD   10/9/2024 10:45 AM Ishaan Fraire MD [facility] PAD PAD   10/11/2024 11:30 AM Irena Styles APRN [facility] U PAD PAD              COURSE & MEDICAL DECISION MAKING    Patient's partial differential diagnosis can include:    Traumatic pneumothorax, traumatic aortic dissection, traumatic thoracic fracture, traumatic lumbar fracture, traumatic cervical fracture, traumatic brain injury, intracranial bleed, skull fracture, thoracic sprain, lumbar sprain, pulmonary  contusion, traumatic hemopneumothorax, liver laceration, abdominal aortic rupture, spleen laceration, concussion, and others    Patient given Dilaudid 1 mg and Zofran 4 mg IV for pain.  Have ordered a trauma series scan and CT scans for further evaluation.    Patient was given another dose of Dilaudid 1 mg and 4 mg Zofran for just prior to the scans due to the patient's increased pain.    CT scans of the head, cervical spine, thoracic spine, lumbar spine, chest abdomen pelvis shows only 2 rib fractures on the right fifth and sixth anterior laterally.  There is no pneumothorax.  There is no liver laceration or spleen laceration.  There is no aortic dissection or rupture.  There is no intracranial bleed.    Due to patient's injury and pain will call the patient's PCP for observation overnight to watch him with neurochecks.  Awaiting for Dr. Tong's return phone call.     Spoke with the patient's PCP Dr. Tong who states he would hobs the patient overnight and monitor him with neurochecks and pain control.      Patient's level of risk: High      CRITICAL CARE    CRITICAL CARE: No    CRITICAL CARE TIME: None      Recent Results (from the past 24 hour(s))   Comprehensive Metabolic Panel    Collection Time: 08/19/24 12:37 PM    Specimen: Blood   Result Value Ref Range    Glucose 133 (H) 65 - 99 mg/dL    BUN 32 (H) 8 - 23 mg/dL    Creatinine 2.20 (H) 0.76 - 1.27 mg/dL    Sodium 144 136 - 145 mmol/L    Potassium 4.3 3.5 - 5.2 mmol/L    Chloride 109 (H) 98 - 107 mmol/L    CO2 22.0 22.0 - 29.0 mmol/L    Calcium 9.2 8.6 - 10.5 mg/dL    Total Protein 7.1 6.0 - 8.5 g/dL    Albumin 3.9 3.5 - 5.2 g/dL    ALT (SGPT) 22 1 - 41 U/L    AST (SGOT) 33 1 - 40 U/L    Alkaline Phosphatase 82 39 - 117 U/L    Total Bilirubin 0.3 0.0 - 1.2 mg/dL    Globulin 3.2 gm/dL    A/G Ratio 1.2 g/dL    BUN/Creatinine Ratio 14.5 7.0 - 25.0    Anion Gap 13.0 5.0 - 15.0 mmol/L    eGFR 30.7 (L) >60.0 mL/min/1.73   Lipase    Collection Time:  08/19/24 12:37 PM    Specimen: Blood   Result Value Ref Range    Lipase 35 13 - 60 U/L   Protime-INR    Collection Time: 08/19/24 12:37 PM    Specimen: Blood   Result Value Ref Range    Protime 12.7 11.8 - 14.8 Seconds    INR 0.92 0.91 - 1.09   Ethanol    Collection Time: 08/19/24 12:37 PM    Specimen: Blood   Result Value Ref Range    Ethanol % <0.010 %   Green Top (Gel)    Collection Time: 08/19/24 12:37 PM   Result Value Ref Range    Extra Tube Hold for add-ons.    Lavender Top    Collection Time: 08/19/24 12:37 PM   Result Value Ref Range    Extra Tube hold for add-on    Light Blue Top    Collection Time: 08/19/24 12:37 PM   Result Value Ref Range    Extra Tube Hold for add-ons.    CBC Auto Differential    Collection Time: 08/19/24 12:37 PM    Specimen: Blood   Result Value Ref Range    WBC 7.31 3.40 - 10.80 10*3/mm3    RBC 4.33 4.14 - 5.80 10*6/mm3    Hemoglobin 11.9 (L) 13.0 - 17.7 g/dL    Hematocrit 37.0 (L) 37.5 - 51.0 %    MCV 85.5 79.0 - 97.0 fL    MCH 27.5 26.6 - 33.0 pg    MCHC 32.2 31.5 - 35.7 g/dL    RDW 14.1 12.3 - 15.4 %    RDW-SD 43.9 37.0 - 54.0 fl    MPV 10.6 6.0 - 12.0 fL    Platelets 273 140 - 450 10*3/mm3    Neutrophil % 62.1 42.7 - 76.0 %    Lymphocyte % 26.4 19.6 - 45.3 %    Monocyte % 6.7 5.0 - 12.0 %    Eosinophil % 3.0 0.3 - 6.2 %    Basophil % 0.8 0.0 - 1.5 %    Immature Grans % 1.0 (H) 0.0 - 0.5 %    Neutrophils, Absolute 4.54 1.70 - 7.00 10*3/mm3    Lymphocytes, Absolute 1.93 0.70 - 3.10 10*3/mm3    Monocytes, Absolute 0.49 0.10 - 0.90 10*3/mm3    Eosinophils, Absolute 0.22 0.00 - 0.40 10*3/mm3    Basophils, Absolute 0.06 0.00 - 0.20 10*3/mm3    Immature Grans, Absolute 0.07 (H) 0.00 - 0.05 10*3/mm3    nRBC 0.0 0.0 - 0.2 /100 WBC   Type & Screen    Collection Time: 08/19/24 12:43 PM    Specimen: Blood   Result Value Ref Range    ABO Type O     RH type Positive     Antibody Screen Negative     T&S Expiration Date 8/22/2024 11:59:59 PM    Red Top    Collection Time: 08/19/24 12:43 PM    Result Value Ref Range    Extra Tube Hold for add-ons.           Also  Old charts were reviewed per [facility] EMR.  Pertinent details are summarized above.  All laboratory, radiologic, and EKG studies that were performed in the Emergency Department were a necessary part of the evaluation needed to exclude unstable or  emergent medical conditions.     Patient was hemodynamically and neurologically stable in the ED.   Pertinent studies were reviewed as above.     The patient received:  Medications   sodium chloride 0.9 % flush 10 mL (has no administration in time range)   HYDROmorphone (DILAUDID) injection 1 mg (1 mg Intravenous Given 8/19/24 1234)   ondansetron (ZOFRAN) injection 4 mg (4 mg Intravenous Given 8/19/24 1234)   HYDROmorphone (DILAUDID) injection 1 mg (1 mg Intravenous Given 8/19/24 1321)   ondansetron (ZOFRAN) injection 4 mg (4 mg Intravenous Given 8/19/24 1321)   iopamidol (ISOVUE-300) 61 % injection 100 mL (100 mL Intravenous Given 8/19/24 1423)            ED Disposition       ED Disposition   Decision to Admit    Condition   --    Comment   --                 Dragon disclaimer:  Part of this note may be an electronic transcription/translation of spoken language to printed text using the Dragon Dictation System.     I have reviewed the patient’s prescription history via a prescription monitoring program.  This information is consistent with my knowledge of the patient’s controlled substance use history.    Patient evaluated during Coronavirus Pandemic. Isolation practices followed according to [facility] policy.    FINAL IMPRESSION   Diagnosis Plan   1. Closed fracture of multiple ribs of left side, initial encounter      Anterior fifth and sixth      2. Fall from ladder, initial encounter      10 feet      3. Neck sprain, initial encounter        4. Thoracic sprain        5. Lumbar sprain, initial encounter        6. Lumbar degenerative disc disease        7. Thoracic degenerative disc disease         8. Contusion of right side of back, initial encounter        9. Concussion with loss of consciousness, initial encounter        10. Laceration of scalp, initial encounter        11. Intractable pain              MD Tomi Magaña Jr, Thomas Mark Jr., MD  08/19/24 0083

## 2024-08-19 NOTE — ED NOTES
Nursing report ED to floor  Frederic Palacios  74 y.o.  male    HPI:   Chief Complaint   Patient presents with    Trauma       Admitting doctor:   No admitting provider for patient encounter.    Consulting provider(s):  Consults       No orders found from 7/21/2024 to 8/20/2024.             Admitting diagnosis:   The primary encounter diagnosis was Closed fracture of multiple ribs of left side, initial encounter. Diagnoses of Fall from ladder, initial encounter, Neck sprain, initial encounter, Thoracic sprain, Lumbar sprain, initial encounter, Lumbar degenerative disc disease, Thoracic degenerative disc disease, Contusion of right side of back, initial encounter, Concussion with loss of consciousness, initial encounter, Laceration of scalp, initial encounter, Intractable pain, and Abrasion of scalp, initial encounter were also pertinent to this visit.    Code status:   Current Code Status       Date Active Code Status Order ID Comments User Context       Prior            Allergies:   Lyrica [pregabalin]    Intake and Output  No intake or output data in the 24 hours ending 08/19/24 1713    Weight:       08/19/24  1229   Weight: 86.6 kg (191 lb)       Most recent vitals:   Vitals:    08/19/24 1256 08/19/24 1311 08/19/24 1316 08/19/24 1316   BP: 136/74 138/71     BP Location:       Patient Position:       Pulse: 74 74 75    Resp:    28   Temp:       TempSrc:       SpO2: 100% 100% 100%    Weight:       Height:         Oxygen Therapy: .    Active LDAs/IV Access:   Lines, Drains & Airways       Active LDAs       Name Placement date Placement time Site Days    Peripheral IV 08/19/24 1229 Anterior;Distal;Left;Upper Arm 08/19/24  1229  Arm  less than 1    Peripheral IV 08/19/24 1244 Right Antecubital 08/19/24  1244  Antecubital  less than 1                    Labs (abnormal labs have a star):   Labs Reviewed   COMPREHENSIVE METABOLIC PANEL - Abnormal; Notable for the following components:       Result Value    Glucose 133  (*)     BUN 32 (*)     Creatinine 2.20 (*)     Chloride 109 (*)     eGFR 30.7 (*)     All other components within normal limits    Narrative:     GFR Normal >60  Chronic Kidney Disease <60  Kidney Failure <15    The GFR formula is only valid for adults with stable renal function between ages 18 and 70.   CBC WITH AUTO DIFFERENTIAL - Abnormal; Notable for the following components:    Hemoglobin 11.9 (*)     Hematocrit 37.0 (*)     Immature Grans % 1.0 (*)     Immature Grans, Absolute 0.07 (*)     All other components within normal limits   LIPASE - Normal   PROTIME-INR - Normal   RAINBOW DRAW    Narrative:     The following orders were created for panel order [facility] Draw.  Procedure                               Abnormality         Status                     ---------                               -----------         ------                     Green Top (Gel)[[id number]]                                  Final result               Lavender Top[[id number]]                                     Final result               Red Top[[id number]]                                          Final result               Light Blue Top[[id number]]                                   Final result                 Please view results for these tests on the individual orders.   ETHANOL    Narrative:     Not for legal purposes. Chain of Custody not followed.    TYPE AND SCREEN   CBC AND DIFFERENTIAL    Narrative:     The following orders were created for panel order CBC & Differential.  Procedure                               Abnormality         Status                     ---------                               -----------         ------                     CBC Auto Differential[[id number]]        Abnormal            Final result                 Please view results for these tests on the individual orders.   GREEN TOP   LAVENDER TOP   RED TOP   LIGHT BLUE TOP       Meds given in ED:   Medications   sodium chloride 0.9 % flush 10 mL (has no  administration in time range)   HYDROmorphone (DILAUDID) injection 1 mg (1 mg Intravenous Given 8/19/24 1234)   ondansetron (ZOFRAN) injection 4 mg (4 mg Intravenous Given 8/19/24 1234)   HYDROmorphone (DILAUDID) injection 1 mg (1 mg Intravenous Given 8/19/24 1321)   ondansetron (ZOFRAN) injection 4 mg (4 mg Intravenous Given 8/19/24 1321)   iopamidol (ISOVUE-300) 61 % injection 100 mL (100 mL Intravenous Given 8/19/24 1423)   ondansetron (ZOFRAN) injection 4 mg (4 mg Intravenous Given 8/19/24 1625)           NIH Stroke Scale:       Isolation/Infection(s):  No active isolations   No active infections     COVID Testing  Collected .  Resulted .    Nursing report ED to floor:  Mental status: .  Ambulatory status: .  Precautions: .    ED nurse phone extentsion- ..

## 2024-08-20 PROBLEM — S06.0X1A CONCUSSION WITH LOSS OF CONSCIOUSNESS OF 30 MINUTES OR LESS: Status: ACTIVE | Noted: 2024-08-20

## 2024-08-20 PROBLEM — N18.32 STAGE 3B CHRONIC KIDNEY DISEASE (CKD): Status: ACTIVE | Noted: 2024-08-20

## 2024-08-20 LAB
ALBUMIN SERPL-MCNC: 3.9 G/DL (ref 3.5–5.2)
ALBUMIN/GLOB SERPL: 1.1 G/DL
ALP SERPL-CCNC: 83 U/L (ref 39–117)
ALT SERPL W P-5'-P-CCNC: 18 U/L (ref 1–41)
ANION GAP SERPL CALCULATED.3IONS-SCNC: 11 MMOL/L (ref 5–15)
AST SERPL-CCNC: 26 U/L (ref 1–40)
BILIRUB SERPL-MCNC: 0.5 MG/DL (ref 0–1.2)
BUN SERPL-MCNC: 33 MG/DL (ref 8–23)
BUN/CREAT SERPL: 16.9 (ref 7–25)
CALCIUM SPEC-SCNC: 9.7 MG/DL (ref 8.6–10.5)
CHLORIDE SERPL-SCNC: 107 MMOL/L (ref 98–107)
CO2 SERPL-SCNC: 25 MMOL/L (ref 22–29)
CREAT SERPL-MCNC: 1.95 MG/DL (ref 0.76–1.27)
EGFRCR SERPLBLD CKD-EPI 2021: 35.4 ML/MIN/1.73
GLOBULIN UR ELPH-MCNC: 3.4 GM/DL
GLUCOSE SERPL-MCNC: 99 MG/DL (ref 65–99)
POTASSIUM SERPL-SCNC: 3.9 MMOL/L (ref 3.5–5.2)
PROT SERPL-MCNC: 7.3 G/DL (ref 6–8.5)
SODIUM SERPL-SCNC: 143 MMOL/L (ref 136–145)

## 2024-08-20 PROCEDURE — 80053 COMPREHEN METABOLIC PANEL: CPT | Performed by: FAMILY MEDICINE

## 2024-08-20 PROCEDURE — 97162 PT EVAL MOD COMPLEX 30 MIN: CPT

## 2024-08-20 PROCEDURE — 25810000003 LACTATED RINGERS PER 1000 ML: Performed by: FAMILY MEDICINE

## 2024-08-20 PROCEDURE — G0378 HOSPITAL OBSERVATION PER HR: HCPCS

## 2024-08-20 RX ORDER — MELOXICAM 15 MG/1
1 TABLET ORAL DAILY
COMMUNITY
Start: 2024-08-20

## 2024-08-20 RX ORDER — SODIUM CHLORIDE, SODIUM LACTATE, POTASSIUM CHLORIDE, CALCIUM CHLORIDE 600; 310; 30; 20 MG/100ML; MG/100ML; MG/100ML; MG/100ML
125 INJECTION, SOLUTION INTRAVENOUS CONTINUOUS
Status: DISCONTINUED | OUTPATIENT
Start: 2024-08-20 | End: 2024-08-21 | Stop reason: HOSPADM

## 2024-08-20 RX ORDER — TIOTROPIUM BROMIDE INHALATION SPRAY 3.12 UG/1
2 SPRAY, METERED RESPIRATORY (INHALATION)
COMMUNITY
Start: 2024-08-20

## 2024-08-20 RX ORDER — DULOXETIN HYDROCHLORIDE 30 MG/1
1 CAPSULE, DELAYED RELEASE ORAL DAILY
COMMUNITY
Start: 2024-08-20

## 2024-08-20 RX ORDER — VALACYCLOVIR HYDROCHLORIDE 1 G/1
1000 TABLET, FILM COATED ORAL DAILY
COMMUNITY

## 2024-08-20 RX ADMIN — GABAPENTIN 300 MG: 300 CAPSULE ORAL at 21:33

## 2024-08-20 RX ADMIN — OXYCODONE HYDROCHLORIDE 5 MG: 5 TABLET ORAL at 06:33

## 2024-08-20 RX ADMIN — LOSARTAN POTASSIUM 50 MG: 50 TABLET, FILM COATED ORAL at 08:01

## 2024-08-20 RX ADMIN — OXYCODONE HYDROCHLORIDE 5 MG: 5 TABLET ORAL at 16:13

## 2024-08-20 RX ADMIN — DOCUSATE SODIUM 50 MG AND SENNOSIDES 8.6 MG 2 TABLET: 8.6; 5 TABLET, FILM COATED ORAL at 21:33

## 2024-08-20 RX ADMIN — ACETAMINOPHEN 650 MG: 325 TABLET ORAL at 21:33

## 2024-08-20 RX ADMIN — OXYCODONE HYDROCHLORIDE 5 MG: 5 TABLET ORAL at 00:45

## 2024-08-20 RX ADMIN — DOCUSATE SODIUM 50 MG AND SENNOSIDES 8.6 MG 2 TABLET: 8.6; 5 TABLET, FILM COATED ORAL at 08:02

## 2024-08-20 RX ADMIN — GABAPENTIN 300 MG: 300 CAPSULE ORAL at 15:08

## 2024-08-20 RX ADMIN — ACETAMINOPHEN 650 MG: 325 TABLET ORAL at 11:52

## 2024-08-20 RX ADMIN — SODIUM CHLORIDE, POTASSIUM CHLORIDE, SODIUM LACTATE AND CALCIUM CHLORIDE 125 ML/HR: 600; 310; 30; 20 INJECTION, SOLUTION INTRAVENOUS at 08:01

## 2024-08-20 RX ADMIN — Medication 10 ML: at 21:34

## 2024-08-20 RX ADMIN — GABAPENTIN 300 MG: 300 CAPSULE ORAL at 06:33

## 2024-08-20 RX ADMIN — ROSUVASTATIN CALCIUM 5 MG: 5 TABLET, FILM COATED ORAL at 21:33

## 2024-08-20 NOTE — PLAN OF CARE
"Goal Outcome Evaluation:  Plan of Care Reviewed With: patient        Progress: no change  Outcome Evaluation: A&Ox4, RA, voiding using urinal. Up x1 to BR. C/O rib pain PRN medications given with improvement. Pt stated \"My ribs hurt so bad when I deep breath.\" Pt is educated about the importance of deep breathing and using the incentive spirometer. Bed alarm on, call light in place.                               "

## 2024-08-20 NOTE — CASE MANAGEMENT/SOCIAL WORK
Discharge Planning Assessment  [facility]     Patient Name: Frederic Palacios  MRN: [id number]  Today's Date: 8/20/2024    Admit Date: 8/19/2024    Plan: Home Health   Discharge Needs Assessment       Row Name 08/20/24 1241       Living Environment    People in Home alone    Current Living Arrangements home    Potentially Unsafe Housing Conditions none    Primary Care Provided by self    Provides Primary Care For no one    Family Caregiver if Needed friend(s)    Quality of Family Relationships supportive;helpful;involved    Able to Return to Prior Arrangements yes       Resource/Environmental Concerns    Resource/Environmental Concerns none       Food Insecurity    Within the past 12 months, you worried that your food would run out before you got the money to buy more. Never true    Within the past 12 months, the food you bought just didn't last and you didn't have money to get more. Never true       Transition Planning    Patient/Family Anticipates Transition to home    Patient/Family Anticipated Services at Transition home health care    Transportation Anticipated family or friend will provide       Discharge Needs Assessment    Readmission Within the Last 30 Days no previous admission in last 30 days    Equipment Currently Used at Home cane, straight    Concerns to be Addressed basic needs;discharge planning    Equipment Needed After Discharge none    Outpatient/Agency/Support Group Needs homecare agency    Discharge Facility/Level of Care Needs home with home health    Current Discharge Risk chronically ill;lives alone                   Discharge Plan       Row Name 08/20/24 1244       Plan    Plan Home Health    Patient/Family in Agreement with Plan yes    Plan Comments Spoke with pt to assess for d/c planning. Pt lives at home alone and plans same. He does want HH care to follow in home, therapy recommends- wants ordered for both P.T and bath aide. Pt has PCM that comes in 1x monthly to check on him. Pt has RX  coverage/PCP. Will follow.                  Continued Care and Services - Admitted Since 8/19/2024    No active coordination exists for this encounter.          Demographic Summary    No documentation.                  Functional Status    No documentation.                  Psychosocial    No documentation.                  Abuse/Neglect    No documentation.                  Legal    No documentation.                  Substance Abuse    No documentation.                  Patient Forms    No documentation.                     HARPER LaurenW

## 2024-08-20 NOTE — PLAN OF CARE
Goal Outcome Evaluation:  Plan of Care Reviewed With: patient           Outcome Evaluation: PT eval complete. Pt in recliner, AOx4 with complaints of minor pain prior to session. Pt reports independence at baseline and goal is to return home independently. Today pt educated on pursed lip breathing as tolerated, bracing with use of a pillow and mobility with pain prior to session. Pt performed functional AROM with increase in pain therefor no MMT performed. Due to pt performing full ROM his strength remains >3+/5. Pt performed sit>stand with CGA and Max verbal cues, ambulated 600' with CGA and FWW and tolerated standing balance to void in bathroom for 3 minutes. Pt gait began with slow shuffling type steps but as he progressed a more normalized gait pattern was present with shortened stride length Pt reported increase pain to 6/10 thorughout session and stated the pain was worse during transitioning movements. Pt will benefit from skilled PT services to improve safety with AD, bed mobility, stair neogitation while maintaining tolerance to pain with mobility. Recommend home with HH when medically stable pending progress and pain control with mobility, but PT will continue to follow.      Anticipated Discharge Disposition (PT): home with home health

## 2024-08-20 NOTE — H&P
History and Physical    Patient:  Frederic Palacios  MRN: [id number]    CHIEF COMPLAINT: Fall, rib pain    History Obtained From: the patient   PCP: Evert Tong MD    HISTORY OF PRESENT ILLNESS:   The patient is a 74 y.o. male who presents with a fall from a ladder that was unwitnessed.  Patient states that he was working on his porch when he fell off a ladder and spent quite some time on the ground as he was unable to get to his phone due to pain and no one was with him.  Complains now of right-sided rib pain.  Did lose consciousness for an unknown period of time but does not believe this was long.  He was found to have rib fractures in the ER of the right side but CT of his head was negative.  He is mentating at his baseline this morning but states that he continues to have right-sided rib pain that is somewhat controlled with oxycodone.  He is mildly sedated when I interview him this morning.    REVIEW OF SYSTEMS:    Review of Systems   Constitutional:  Negative for chills and fever.   HENT:  Negative for congestion and sore throat.    Respiratory:  Negative for cough.    Cardiovascular:  Positive for chest pain. Negative for leg swelling.   Gastrointestinal:  Negative for abdominal pain.   Genitourinary:  Negative for dysuria.   Musculoskeletal:  Negative for back pain.   Neurological:  Negative for dizziness and headaches.   Psychiatric/Behavioral:  Negative for agitation and confusion.           Past Medical History:  Past Medical History:   Diagnosis Date    History of radiation therapy 01/03/2017    7560 cGy to prostate    Hypertension     Prostate cancer        Past Surgical History:  Past Surgical History:   Procedure Laterality Date    COLONOSCOPY  07/08/2010    normal    COLONOSCOPY  01/29/2004    small polyp at 25 cm,otherwise normal colonoscopy    COLONOSCOPY N/A 2/16/2018    Procedure: COLONOSCOPY WITH ANESTHESIA;  Surgeon: Abhijeet Alonso DO;  Location: [facility] ENDOSCOPY;  Service:      "ENDOSCOPY  01/29/2004    gastritis    PROSTATE BIOPSY         Medications Prior to Admission:    Medications Prior to Admission   Medication Sig Dispense Refill Last Dose    albuterol sulfate  (90 Base) MCG/ACT inhaler Inhale 2 puffs Every 4 (Four) Hours As Needed for Wheezing. 18 g 11     gabapentin (NEURONTIN) 800 MG tablet Take 1 tablet by mouth 3 (Three) Times a Day. for 30 days.       losartan (COZAAR) 50 MG tablet Take 1 tablet by mouth Daily.       meloxicam (MOBIC) 15 MG tablet  (Patient not taking: Reported on 5/21/2024)       nortriptyline (PAMELOR) 10 MG capsule Take 1 capsule by mouth At Night As Needed for Sleep.       rosuvastatin (CRESTOR) 5 MG tablet Take 1 tablet by mouth Daily.       tiotropium bromide monohydrate (Spiriva Respimat) 2.5 MCG/ACT aerosol solution inhaler INHALE 2 PUFFS BY MOUTH DAILY 4 g 11     triamterene-hydrochlorothiazide (MAXZIDE-25) 37.5-25 MG per tablet Take 1 tablet by mouth Every Morning.          Allergies:  Lyrica [pregabalin]    Social History:   Social History     Socioeconomic History    Marital status: Single   Tobacco Use    Smoking status: Never    Smokeless tobacco: Never   Vaping Use    Vaping status: Never Used   Substance and Sexual Activity    Alcohol use: No    Drug use: No    Sexual activity: Not Currently       Family History:   Family History   Problem Relation Age of Onset    COPD Father     Colon cancer Father     Colon cancer Maternal Grandmother            Physical Exam:    Vitals: /51 (BP Location: Left arm, Patient Position: Lying)   Pulse 57   Temp 97.7 °F (36.5 °C) (Oral)   Resp 14   Ht 172.7 cm (67.99\")   Wt 86.6 kg (191 lb)   SpO2 97%   BMI 29.05 kg/m²   Physical Exam  Constitutional:       Appearance: He is well-developed.   HENT:      Head: Normocephalic and atraumatic.   Eyes:      Conjunctiva/sclera: Conjunctivae normal.      Pupils: Pupils are equal, round, and reactive to light.   Cardiovascular:      Rate and Rhythm: " Normal rate and regular rhythm.      Heart sounds: Normal heart sounds. No murmur heard.     No friction rub.   Pulmonary:      Effort: Pulmonary effort is normal. No respiratory distress.      Breath sounds: Normal breath sounds. No wheezing or rales.   Chest:      Chest wall: Tenderness present.   Abdominal:      General: Bowel sounds are normal. There is no distension.      Palpations: Abdomen is soft.      Tenderness: There is no abdominal tenderness.   Musculoskeletal:         General: Normal range of motion.      Cervical back: Normal range of motion.   Skin:     Capillary Refill: Capillary refill takes less than 2 seconds.   Neurological:      Mental Status: He is alert and oriented to person, place, and time.      Cranial Nerves: No cranial nerve deficit.   Psychiatric:         Behavior: Behavior normal.           Lab Results (last 24 hours)       Procedure Component Value Units Date/Time    [facility] Draw [[id number]] Collected: 08/19/24 1237    Specimen: Blood Updated: 08/19/24 1315    Narrative:      The following orders were created for panel order [facility].  Procedure                               Abnormality         Status                     ---------                               -----------         ------                     Green Top (Gel)[[id number]]                                  Final result               Lavender Top[[id number]]                                     Final result               Red Top[[id number]]                                          Final result               Light Blue Top[[id number]]                                   Final result                 Please view results for these tests on the individual orders.    Red Top [[id number]] Collected: 08/19/24 1243    Specimen: Blood Updated: 08/19/24 1315     Extra Tube Hold for add-ons.     Comment: Auto resulted.       Comprehensive Metabolic Panel [[id number]]  (Abnormal) Collected: 08/19/24 1237    Specimen: Blood Updated: 08/19/24  1312     Glucose 133 mg/dL      BUN 32 mg/dL      Creatinine 2.20 mg/dL      Sodium 144 mmol/L      Potassium 4.3 mmol/L      Comment: Slight hemolysis detected by analyzer. Result may be falsely elevated.        Chloride 109 mmol/L      CO2 22.0 mmol/L      Calcium 9.2 mg/dL      Total Protein 7.1 g/dL      Albumin 3.9 g/dL      ALT (SGPT) 22 U/L      AST (SGOT) 33 U/L      Comment: Slight hemolysis detected by analyzer. Result may be falsely elevated.        Alkaline Phosphatase 82 U/L      Total Bilirubin 0.3 mg/dL      Globulin 3.2 gm/dL      A/G Ratio 1.2 g/dL      BUN/Creatinine Ratio 14.5     Anion Gap 13.0 mmol/L      eGFR 30.7 mL/min/1.73     Narrative:      GFR Normal >60  Chronic Kidney Disease <60  Kidney Failure <15    The GFR formula is only valid for adults with stable renal function between ages 18 and 70.    Lipase [[id number]]  (Normal) Collected: 08/19/24 1237    Specimen: Blood Updated: 08/19/24 1304     Lipase 35 U/L     Ethanol [[id number]] Collected: 08/19/24 1237    Specimen: Blood Updated: 08/19/24 1303     Ethanol % <0.010 %     Narrative:      Not for legal purposes. Chain of Custody not followed.     Protime-INR [[id number]]  (Normal) Collected: 08/19/24 1237    Specimen: Blood Updated: 08/19/24 1255     Protime 12.7 Seconds      INR 0.92    CBC & Differential [[id number]]  (Abnormal) Collected: 08/19/24 1237    Specimen: Blood Updated: 08/19/24 1248    Narrative:      The following orders were created for panel order CBC & Differential.  Procedure                               Abnormality         Status                     ---------                               -----------         ------                     CBC Auto Differential[[id number]]        Abnormal            Final result                 Please view results for these tests on the individual orders.    CBC Auto Differential [[id number]]  (Abnormal) Collected: 08/19/24 1237    Specimen: Blood Updated: 08/19/24 1248     WBC 7.31 10*3/mm3       RBC 4.33 10*6/mm3      Hemoglobin 11.9 g/dL      Hematocrit 37.0 %      MCV 85.5 fL      MCH 27.5 pg      MCHC 32.2 g/dL      RDW 14.1 %      RDW-SD 43.9 fl      MPV 10.6 fL      Platelets 273 10*3/mm3      Neutrophil % 62.1 %      Lymphocyte % 26.4 %      Monocyte % 6.7 %      Eosinophil % 3.0 %      Basophil % 0.8 %      Immature Grans % 1.0 %      Neutrophils, Absolute 4.54 10*3/mm3      Lymphocytes, Absolute 1.93 10*3/mm3      Monocytes, Absolute 0.49 10*3/mm3      Eosinophils, Absolute 0.22 10*3/mm3      Basophils, Absolute 0.06 10*3/mm3      Immature Grans, Absolute 0.07 10*3/mm3      nRBC 0.0 /100 WBC     Lavender Top [[id number]] Collected: 08/19/24 1237    Specimen: Blood Updated: 08/19/24 1245     Extra Tube hold for add-on     Comment: Auto resulted       Green Top (Gel) [[id number]] Collected: 08/19/24 1237    Specimen: Blood Updated: 08/19/24 1245     Extra Tube Hold for add-ons.     Comment: Auto resulted.       Light Blue Top [[id number]] Collected: 08/19/24 1237    Specimen: Blood Updated: 08/19/24 1245     Extra Tube Hold for add-ons.     Comment: Auto resulted                -----------------------------------------------------------------    Radiology:     CT Abdomen Pelvis With Contrast    Result Date: 8/19/2024  EXAMINATION:  CT ABDOMEN PELVIS W CONTRAST-  8/19/2024 12:50 PM  HISTORY: The patient fell from 10 feet.  TECHNIQUE: Spiral CT was performed of the abdomen and pelvis with IV contrast. Multiplanar images were reconstructed.  DLP: 4057.94 mGy.cm Automated dosage reduction technique was utilized to reduce patient dose.  COMPARISON: 8/13/2024 and 9/23/2023.  LUNG BASES: There is atelectasis in both lung bases. There are nondisplaced fractures of the anterolateral right fifth, sixth, seventh and eighth ribs. There is a nondisplaced fracture of the posterior right ninth rib. There is no visible pneumothorax in the right lung base.  LIVER AND SPLEEN: There is a 2.9 cm left hepatic lobe liver  cyst. There is an 8 mm cyst in the right hepatic lobe image 39 series 3. The spleen is unremarkable.  PANCREAS: No pancreatic mass or inflammatory change.  KIDNEYS AND ADRENALS: The adrenal glands are normal. There are fairly large dominant renal cysts on the left. There are 2 hyperdense lesions in the mid to lower pole right kidney, one measuring 33 Hounsfield units and 2 cm and the other measuring 62 Hounsfield units and 1.4 cm. Both of these lesions are stable in size compared to 9/23/2023. They are likely hyperdense hemorrhagic or proteinaceous cysts. The ureters are nondilated. The urinary bladder is unremarkable. There are metallic appearing implants within the prostate gland.  BOWEL: There is a small hiatal hernia. There is under distention of the stomach. Small bowel loops are nondilated. There is under distention of portions of the colon.  OTHER: There is atheromatous disease of the aortoiliac vessels. There are degenerative changes of the spine and hips. There are no enlarged lymph nodes in the abdomen or the pelvis.       1. There are nondisplaced rib fractures on the right. There is atelectasis in both lung bases. There is no evidence of pneumothorax. 2. No solid organ injury or hemoperitoneum is seen. 3. Liver and renal cysts. There are probable hyperdense hemorrhagic or proteinaceous cyst on the right side that appears stable in size compared back to 9/23/2023. Enhancing lesions are not fully excluded on this exam as no noncontrast exam was obtained. Nonemergent follow-up ultrasound could be performed to evaluate the hyperdense right renal lesions further. 4. Degenerative changes of the visualized spine and hips. Atheromatous disease of the aortoiliac vessels.   The full report of this exam was immediately signed and available to the emergency room. The patient is currently in the emergency room.  This report was signed and finalized on 8/19/2024 3:02 PM by Dr. Justin Izquierdo MD.      CT Chest With  Contrast Diagnostic    Result Date: 8/19/2024  CT CHEST W CONTRAST DIAGNOSTIC- 8/19/2024 12:50 PM  HISTORY: Fall trauma height 10 feet  COMPARISON: 4/1/2024  DLP: 328.1 mGycm. All CT scans are performed using dose optimization techniques as appropriate to the performed exam and including at least one of the following: Automated exposure control, adjustment of the mA and/or kV according to size, and the use of the iterative reconstruction technique.  TECHNIQUE: Serial helical tomographic images of the chest were acquired following the infusion of Isovue contrast intravenously. Bone and soft tissue algorithms were provided.   FINDINGS: Neck base: The imaged portion of the neck and thyroid gland is unremarkable.  Lungs: No pneumothorax or pulmonary contusion. There is bibasilar subsegmental atelectasis. No acute infiltrate.. No pleural effusion is present. The trachea and bronchial tree are patent.  Heart: Normal in size and appearance. There is no pericardial effusion.  Vasculature: There is aortic atherosclerosis without stenosis or aneurysm. No coronary arteriosclerosis identified. There is atherosclerosis in the great vessels without definite stenosis. The pulmonary arteries are normal in appearance.  Lymph nodes: No enlarged axillary, hilar, or mediastinal lymph nodes.  Bones and soft tissues: There are nondisplaced fractures of the right anterolateral fifth and sixth ribs. No additional rib fractures are identified. The sternum is intact..  Upper abdomen: A small hiatal hernia is present. There is a benign hepatic cyst within the left lobe of the liver measuring 3.3 cm in size. There are cortical cysts of the upper pole of both kidneys. The adrenals are unremarkable. A small hiatal hernia is present..      1. Bibasilar atelectasis. Lungs are otherwise clear with no evidence of pulmonary contusion or pneumothorax. No acute infiltrate. No evidence of mediastinal hematoma. 2. Nondisplaced fractures of the right  anterolateral fifth and sixth ribs. There is spondylosis in the mid and upper thoracic spine with an accentuated thoracic kyphosis. No paraspinal hematoma demonstrated. 3. Cortical cysts of the upper pole of the kidneys. There is a hepatic cyst within the left lobe of the liver. A small hiatal hernia is present..    This report was signed and finalized on 8/19/2024 2:51 PM by Dr. Hakan Guzman MD.      CT Lumbar Spine Without Contrast    Result Date: 8/19/2024  EXAMINATION: CT LUMBAR SPINE WO CONTRAST-  8/19/2024 2:40 PM  HISTORY: Fall height 10 feet ladder with LOC  COMPARISON: 8/13/2024  DLP: 4057.94 mGy.cm  In order to have a CT radiation dose as low as reasonably achievable, Automated Exposure Control was utilized for adjustment of the mA and/or KV according to patient size.  TECHNIQUE: Serial helical tomographic images of the lumbar spine were obtained without the use of intravenous contrast. Additionally, sagittal and coronal reformatted images were provided for review.  FINDINGS: OSSEOUS: I don't see any definite evidence of an acute fracture deformity. There is multilevel facet arthropathy. There is arthritis at the bilateral SI joints with some hypertrophic changes but no evidence of fusion. Multilevel bridging endplate osteophytes in the lower thoracic spine and in some of the lumbar spine as well.  ALIGNMENT: Minimal retrolisthesis of L3 on L4 and L2 on L3.  DISC SPACES: There is mild disc space height loss at L5-S1, L3-L4, and L2-L3. Multilevel endplate osteophyte formation throughout the lumbar spine.  SPINAL CANAL AND FORAMINA: At L2-L3 there is mild diffuse disc bulge but no definite evidence of foraminal narrowing. Very mild thecal sac narrowing. Similar findings at L3-L4 without foraminal narrowing but mild thecal sac narrowing suspected. Shallow posterior protrusion at L5-S1 remains subligamentous. No thecal sac or foraminal narrowing.  SOFT TISSUES: Atherosclerotic vascular disease in the aorta  and iliac vessels.        1.  Degenerative changes in the lumbar spine but no definite acute osseous abnormality. Please see the report above for details regarding thecal sac and foraminal narrowing.   This report was signed and finalized on 8/19/2024 2:45 PM by Dr. Rj Freedman MD.      CT Thoracic Spine Without Contrast    Result Date: 8/19/2024  Examination: CT thoracic spine without contrast 8/19/2024  HISTORY: Fall from height of 10 feet. Loss of consciousness.  DOSE: 328.1 mGycm. All CT scans are performed using dose optimization techniques as appropriate to the performed exam and including at least one of the following: Automated exposure control, adjustment of the mA and/or kV according to size, and the use of the iterative reconstruction technique.  FINDINGS: Multiple contiguous axial images are obtained through the thoracic spine at 2 mm intervals with reformatted images obtained in the sagittal and coronal projection from the original data set. There is no evidence of acute fracture or malalignment. There is degenerative disc disease and spondylosis within the mid and upper thoracic spine. There is an accentuated thoracic kyphosis.      . 1. No evidence of acute fracture or listhesis. 2. Degenerative disc disease and spondylosis in the mid and upper thoracic spine with an accentuated thoracic kyphosis.   This report was signed and finalized on 8/19/2024 2:42 PM by Dr. Hakan Guzmna MD.      CT Cervical Spine Without Contrast    Result Date: 8/19/2024  CT CERVICAL SPINE WO CONTRAST- 8/19/2024 12:50 PM  HISTORY: Fall height 10 feet ladder with LOC  COMPARISON: None  DOSE LENGTH PRODUCT: 442.7 mGycm. All CT scans are performed using dose optimization techniques as appropriate to the performed exam and including at least one of the following: Automated exposure control, adjustment of the mA and/or kV according to size, and the use of the iterative reconstruction technique.  TECHNIQUE: Serial helical  tomographic images of the cervical spine were obtained without the use of intravenous contrast. Additionally, sagittal and coronal reformatted images were also provided for review.  FINDINGS: Alignment: Normal.  Bones: There is no evidence of fracture. Vertebral body heights are maintained.  Disc spaces: Degenerative disc disease is noted at C5-C6, C6-C7 and C7-T1.  Canal and neuroforamina: There is central stenosis at C5-C6 and C6-C7 as well as C7-T1 related to disc osteophyte complex. There is foraminal narrowing at multiple levels related to facet arthropathy and uncinate spurring.  Soft tissues: Unremarkable.  Lung apices: Clear. No pneumothorax.      1. No evidence of acute osseous injury or malalignment in the cervical spine.  2. Central and foraminal stenosis at multiple levels in the cervical spine as described above.    This report was signed and finalized on 8/19/2024 2:39 PM by Dr. Hakan Guzman MD.      CT Head Without Contrast    Result Date: 8/19/2024  CT HEAD WO CONTRAST- 8/19/2024 12:50 PM  HISTORY: Fall from ladder trauma LOC  COMPARISON: None  DLP: 863.1. All CT scans are performed using dose optimization techniques as appropriate to the performed exam and including at least one of the following: Automated exposure control, adjustment of the mA and/or kV according to size, and the use of the iterative reconstruction technique.  TECHNIQUE: Serial axial tomographic images of the brain were obtained without the use of intravenous contrast.  FINDINGS: The midline structures are nondisplaced. There is mild cerebral and cerebellar atrophy, with an associated increase in the prominence of the ventricles and sulci. The basilar cisterns are normal in size and configuration. There is no evidence of intracranial hemorrhage or mass-effect. There is low attenuation in the periventricular white matter, consistent with chronic ischemic change. There are no abnormal extra-axial fluid collections. There is no  evidence of tonsillar herniation.  The included orbits and their contents are unremarkable. The visualized paranasal sinuses, mastoid air cells and middle ear cavities are clear. The visualized osseous structures and overlying soft tissues of the skull and face are intact.      Mild cerebral and cerebellar atrophy with chronic microvascular disease but no evidence of acute intracranial process.    This report was signed and finalized on 8/19/2024 2:36 PM by Dr. Hakan Guzman MD.        Assessment and Plan   Rib fractures with intractable pain  Patient continues to have pain and feels like he cannot care for himself at home as he lives alone.  States that he is unable to get up and move without severe pain.  Will have PT/OT evaluate him today get case management on board.      KAIT on CKD stage IIIb  Patient's creatinine elevated to 2.2 from baseline around 1.6-1.8.  Likely related to dehydration.  Discontinue his diuretic and start gentle fluids.  Monitor creatinine.    Hypertension  Blood pressure stable  Hold diuretic      Disposition: Observation    CODE STATUS: Full    DVT prophylaxis: SCDs          Rib fractures    Hypertension    KAIT (acute kidney injury)    Stage 3b chronic kidney disease (CKD)    Concussion with loss of consciousness of 30 minutes or less      Evert Tong MD 8/20/2024 07:33 CDT

## 2024-08-20 NOTE — PLAN OF CARE
Goal Outcome Evaluation:  Plan of Care Reviewed With: patient        Progress: no change  Outcome Evaluation: ivf in progress. much pain right side, worse with movement. medicated per order- with fair relief. tolerates diet. have instructed to call for assist getting up. cont to monitor.

## 2024-08-20 NOTE — NURSING NOTE
"Nursing received pt from ER, ex-wife at bedside, pt in pain \"10\" of \"10\" pain, reviewing orders, called MD for diet order, pain med and food given, call light within reach and bed alarm on. Report to be given to oncoming shift for any further orders.  "

## 2024-08-20 NOTE — THERAPY EVALUATION
Patient Name: Frederic Palacios  : 1950    MRN: [id number]                              Today's Date: 2024       Admit Date: 2024    Visit Dx:     ICD-10-CM ICD-9-CM   1. Closed fracture of multiple ribs of left side, initial encounter  S22.42XA 807.09   2. Fall from ladder, initial encounter  W11.XXXA E881.0   3. Neck sprain, initial encounter  S13.9XXA 847.0   4. Thoracic sprain  S23.9XXA 847.1   5. Lumbar sprain, initial encounter  S33.5XXA 847.2   6. Lumbar degenerative disc disease  M51.36 722.52   7. Thoracic degenerative disc disease  M51.34 722.51   8. Contusion of right side of back, initial encounter  S20.221A 922.31   9. Concussion with loss of consciousness, initial encounter  S06.0X9A 850.5   10. Laceration of scalp, initial encounter  S01.01XA 873.0   11. Intractable pain  R52 780.96   12. Abrasion of scalp, initial encounter  S00.01XA 910.0   13. Impaired mobility [Z74.09]  Z74.09 799.89     Patient Active Problem List   Diagnosis    Prostate cancer    History of radiation therapy    Erectile dysfunction    Family history of colon cancer    Non-smoker    Encounter for follow-up surveillance of prostate cancer    Heartburn    Hypertension    Generalized weakness    General weakness    COVID-19    KAIT (acute kidney injury)    Hypokalemia    Rib fractures    Stage 3b chronic kidney disease (CKD)    Concussion with loss of consciousness of 30 minutes or less     Past Medical History:   Diagnosis Date    History of radiation therapy 2017    7560 cGy to prostate    Hypertension     Prostate cancer      Past Surgical History:   Procedure Laterality Date    COLONOSCOPY  2010    normal    COLONOSCOPY  2004    small polyp at 25 cm,otherwise normal colonoscopy    COLONOSCOPY N/A 2018    Procedure: COLONOSCOPY WITH ANESTHESIA;  Surgeon: Abhijeet Alonso DO;  Location: [facility] ENDOSCOPY;  Service:     ENDOSCOPY  2004    gastritis    PROSTATE BIOPSY        General  Information       Row Name 08/20/24 1053          Physical Therapy Time and Intention    Document Type evaluation  fall from ladder Dx; R sided Anterolateral rib fractures at rib 5-6 with loss of consciousness. H/o neuropathy and CKD  -     Mode of Treatment physical therapy  -       Row Name 08/20/24 1053          General Information    Patient Profile Reviewed yes  -AJ     Prior Level of Function independent:;all household mobility;community mobility;home management;gait;ADL's  -AJ     Existing Precautions/Restrictions fall;right;other (see comments)  rib fractures  -     Barriers to Rehab medically complex;physical barrier  -       Row Name 08/20/24 1053          Living Environment    People in Home alone  -[facility] Name 08/20/24 1053          Home Main Entrance    Number of Stairs, Main Entrance two;other (see comments)  has a ramp but is unfinished  -[facility] Name 08/20/24 1053          Stairs Within Home, Primary    Number of Stairs, Within Home, Primary none  -[facility] Name 08/20/24 1053          Cognition    Orientation Status (Cognition) oriented x 4  -[facility] Name 08/20/24 1053          Safety Issues, Functional Mobility    Safety Issues Affecting Function (Mobility) impulsivity;safety precaution awareness;safety precautions follow-through/compliance  -     Impairments Affecting Function (Mobility) pain;shortness of breath  -               User Key  (r) = Recorded By, (t) = Taken By, (c) = Cosigned By      Initials Name Provider Type    Pierre Ware, PT DPT Physical Therapist                   Mobility       Row Name 08/20/24 1053          Bed-Chair Transfer    Bed-Chair [place] (Transfers) contact guard  -     Assistive Device (Bed-Chair Transfers) walker, front-wheeled  -       Row Name 08/20/24 1053          Sit-Stand Transfer    Sit-Stand [place] (Transfers) standby assist;contact guard  -     Assistive Device (Sit-Stand Transfers) walker, front-wheeled   -       Row Name 08/20/24 1053          Gait/Stairs (Locomotion)    [place] Level (Gait) contact guard;1 person to manage equipment  -     Assistive Device (Gait) walker, front-wheeled  -     Patient was able to Ambulate yes  -AJ     Distance in Feet (Gait) 600  -AJ     Deviations/Abnormal Patterns (Gait) bilateral deviations  -     Bilateral Gait Deviations forward flexed posture  -               User Key  (r) = Recorded By, (t) = Taken By, (c) = Cosigned By      Initials Name Provider Type    Pierre Ware PT DPT Physical Therapist                   Obj/Interventions       Row Name 08/20/24 1053          Range of Motion Comprehensive    General Range of Motion bilateral lower extremity ROM WFL  -       Row Name 08/20/24 1053          Strength Comprehensive (MMT)    General Manual Muscle Testing (MMT) Assessment lower extremity strength deficits identified  -     Comment, General Manual Muscle Testing (MMT) Assessment No formal MMT performed due to pain, but demo >3+/5 LE strength  -       Row Name 08/20/24 1053          Balance    Balance Assessment sitting static balance;sitting dynamic balance;standing static balance;standing dynamic balance  -     Static Sitting Balance independent  -     Dynamic Sitting Balance independent  -     Position, Sitting Balance supported;sitting in chair  -     Static Standing Balance standby assist  -     Dynamic Standing Balance contact guard  -     Position/Device Used, Standing Balance supported;walker, rolling  -     Balance Interventions sitting;standing;sit to stand;supported;static;dynamic  -       Row Name 08/20/24 1053          Sensory Assessment (Somatosensory)    Sensory Assessment (Somatosensory) bilateral LE  -AJ     Bilateral LE Sensory Assessment general sensation;impaired;absent;other (see comments)  reports nueropathy in B legs and feet  -               User Key  (r) = Recorded By, (t) = Taken By, (c) = Cosigned By       Initials Name Provider Type    Pierre Ware, PT DPT Physical Therapist                   Goals/Plan       Row Name 08/20/24 1053          Bed Mobility Goal 1 (PT)    Activity/Assistive Device (Bed Mobility Goal 1, PT) rolling to left;rolling to right;sit to supine;supine to sit  -AJ     [place] Level/Cues Needed (Bed Mobility Goal 1, PT) supervision required  -AJ     Time Frame (Bed Mobility Goal 1, PT) long term goal (LTG);10 days  -AJ     Strategies/Barriers (Bed Mobility Goal 1, PT) pain less than 5/10  -AJ     Progress/Outcomes (Bed Mobility Goal 1, PT) new goal  -AJ       Row Name 08/20/24 1053          Transfer Goal 1 (PT)    Activity/Assistive Device (Transfer Goal 1, PT) sit-to-stand/stand-to-sit;bed-to-chair/chair-to-bed;toilet;walk-in shower  -AJ     [place] Level/Cues Needed (Transfer Goal 1, PT) standby assist;supervision required  -AJ     Time Frame (Transfer Goal 1, PT) long term goal (LTG);10 days  -AJ     Strategies/Barriers (Transfers Goal 1, PT) pain less than 5/10  -AJ     Progress/Outcome (Transfer Goal 1, PT) new goal  -AJ       Row Name 08/20/24 1053          Gait Training Goal 1 (PT)    Activity/Assistive Device (Gait Training Goal 1, PT) gait (walking locomotion);decrease asymmetrical patterns;decrease fall risk;diminish gait deviation;forward stepping;improve balance and speed;increase endurance/gait distance;increase energy conservation;assistive device use;walker, rolling  -AJ     [place] Level (Gait Training Goal 1, PT) independent  -AJ     Distance (Gait Training Goal 1, PT) 500' with pain less than 4/10  -AJ     Time Frame (Gait Training Goal 1, PT) long term goal (LTG);10 days  -AJ     Progress/Outcome (Gait Training Goal 1, PT) new goal  -       Row Name 08/20/24 1053          Stairs Goal 1 (PT)    Activity/Assistive Device (Stairs Goal 1, PT) stairs, all skills;ascending stairs;descending stairs;step-to-step;decrease fall risk;improve balance and speed  -AJ      [place] Level/Cues Needed (Stairs Goal 1, PT) contact guard required  -AJ     Number of Stairs (Stairs Goal 1, PT) 2 as needed for home and pain less than 4/10  -AJ     Time Frame (Stairs Goal 1, PT) long term goal (LTG);10 days  -AJ     Progress/Outcome (Stairs Goal 1, PT) new goal  -       Row Name 08/20/24 1053          Therapy Assessment/Plan (PT)    Planned Therapy Interventions (PT) balance training;bed mobility training;home exercise program;patient/family education;gait training;transfer training;postural re-education;lumbar stabilization;strengthening;stair training;ROM (range of motion)  -AJ               User Key  (r) = Recorded By, (t) = Taken By, (c) = Cosigned By      Initials Name Provider Type    Pierre Ware, PT DPT Physical Therapist                   Clinical Impression       Row Name 08/20/24 1053          Pain    Pretreatment Pain Rating 2/10  -AJ     Posttreatment Pain Rating 6/10  -AJ     Pain Location - Side/Orientation Right  -AJ     Pain Location generalized  -AJ     Pain Location - abdomen;chest;flank  -AJ     Pre/Posttreatment Pain Comment where fractures present  -AJ     Pain Intervention(s) Ambulation/increased activity;Nursing Notified;Repositioned  -AJ     Additional Documentation Pain Scale: Numbers Pre/Post-Treatment (Group)  -       Row Name 08/20/24 1053          Plan of Care Review    Plan of Care Reviewed With patient  -AJ     Outcome Evaluation PT eval complete. Pt in recliner, AOx4 with complaints of minor pain prior to session. Pt reports independence at baseline and goal is to return home independently. Today pt educated on pursed lip breathing as tolerated, bracing with use of a pillow and mobility with pain prior to session. Pt performed functional AROM with increase in pain therefor no MMT performed. Due to pt performing full ROM his strength remains >3+/5. Pt performed sit>stand with CGA and Max verbal cues, ambulated 600' with CGA and FWW and tolerated  standing balance to void in bathroom for 3 minutes. Pt gait began with slow shuffling type steps but as he progressed a more normalized gait pattern was present with shortened stride length Pt reported increase pain to 6/10 thorughout session and stated the pain was worse during transitioning movements. Pt will benefit from skilled PT services to improve safety with AD, bed mobility, stair neogitation while maintaining tolerance to pain with mobility. Recommend home with HH when medically stable pending progress and pain control with mobility, but PT will continue to follow.  -       Row Name 08/20/24 1053          Therapy Assessment/Plan (PT)    Rehab Potential (PT) good, to achieve stated therapy goals  -     Criteria for Skilled Interventions Met (PT) yes;meets criteria;skilled treatment is necessary  -AJ     Therapy Frequency (PT) 2 times/day  -AJ     Predicted Duration of Therapy Intervention (PT) until d/c  -KJ       Row Name 08/20/24 1053          Vital Signs    Pre SpO2 (%) 99  -AJ     O2 Delivery Pre Treatment room air  -AJ     Intra SpO2 (%) 90  -AJ     O2 Delivery Intra Treatment room air  -AJ     Post SpO2 (%) 96  -AJ     O2 Delivery Post Treatment room air  -AJ     Pre Patient Position Sitting  -AJ     Intra Patient Position Standing  -AJ     Post Patient Position Sitting  -AJ       Row Name 08/20/24 1053          Positioning and Restraints    Pre-Treatment Position sitting in chair/recliner  -AJ     Post Treatment Position chair  -AJ     In Chair notified nsg;with nsg;call light within reach;encouraged to call for assist;with family/caregiver  -               User Key  (r) = Recorded By, (t) = Taken By, (c) = Cosigned By      Initials Name Provider Type    Pierre Ware, PT DPT Physical Therapist                   Outcome Measures       Row Name 08/20/24 1053 08/20/24 0800       How much help from another person do you currently need...    Turning from your back to your side while in flat bed  without using bedrails? 4  did not asses  -AJ 3  -TM    Moving from lying on back to sitting on the side of a flat bed without bedrails? 4  did not asses  -AJ 3  -TM    Moving to and from a bed to a chair (including a wheelchair)? 3  -AJ 3  -TM    Standing up from a chair using your arms (e.g., wheelchair, bedside chair)? 3  -AJ 3  -TM    Climbing 3-5 steps with a railing? 2  -AJ 3  -TM    To walk in hospital room? 4  -AJ 3  -TM    AM-PAC 6 Clicks Score (PT) 20  - 18  -TM    Highest Level of Mobility Goal 6 --> Walk 10 steps or more  - 6 --> Walk 10 steps or more  -      Row Name 08/20/24 1053          Functional Assessment    Outcome Measure Options AM-PAC 6 Clicks Basic Mobility (PT)  -               User Key  (r) = Recorded By, (t) = Taken By, (c) = Cosigned By      Initials Name Provider Type    TM Aviva Kaur, RN Registered Nurse    Pierre Ware, PT DPT Physical Therapist                                 Physical Therapy Education       Title: PT OT SLP Therapies (Done)       Topic: Physical Therapy (Done)       Point: Mobility training (Done)       Learning Progress Summary             Patient Acceptance, E, VU by KJ at 8/20/2024 1210    Comment: bracing with pillow as needed, role of PT, goals and pain control                         Point: Home exercise program (Done)       Learning Progress Summary             Patient Acceptance, E, VU by KJ at 8/20/2024 1210    Comment: bracing with pillow as needed, role of PT, goals and pain control                         Point: Body mechanics (Done)       Learning Progress Summary             Patient Acceptance, E, VU by KJ at 8/20/2024 1210    Comment: bracing with pillow as needed, role of PT, goals and pain control                         Point: Precautions (Done)       Learning Progress Summary             Patient Acceptance, E, VU by KJ at 8/20/2024 1210    Comment: bracing with pillow as needed, role of PT, goals and pain control                                          User Key       Initials Effective Dates Name Provider Type Discipline     08/15/24 -  Pierre Land, PT DPT Physical Therapist PT                  PT Recommendation and Plan  Planned Therapy Interventions (PT): balance training, bed mobility training, home exercise program, patient/family education, gait training, transfer training, postural re-education, lumbar stabilization, strengthening, stair training, ROM (range of motion)  Plan of Care Reviewed With: patient  Outcome Evaluation: PT eval complete. Pt in recliner, AOx4 with complaints of minor pain prior to session. Pt reports independence at baseline and goal is to return home independently. Today pt educated on pursed lip breathing as tolerated, bracing with use of a pillow and mobility with pain prior to session. Pt performed functional AROM with increase in pain therefor no MMT performed. Due to pt performing full ROM his strength remains >3+/5. Pt performed sit>stand with CGA and Max verbal cues, ambulated 600' with CGA and FWW and tolerated standing balance to void in bathroom for 3 minutes. Pt gait began with slow shuffling type steps but as he progressed a more normalized gait pattern was present with shortened stride length Pt reported increase pain to 6/10 thorughout session and stated the pain was worse during transitioning movements. Pt will benefit from skilled PT services to improve safety with AD, bed mobility, stair neogitation while maintaining tolerance to pain with mobility. Recommend home with HH when medically stable pending progress and pain control with mobility, but PT will continue to follow.     Time Calculation:         PT Charges       Row Name 08/20/24 1053             Time Calculation    Start Time 1053  -AJ      Stop Time 1155  -AJ      Time Calculation (min) 62 min  -AJ      PT Received On 08/20/24  -AJ      PT Goal Re-Cert Due Date 08/30/24  -AJ         Untimed Charges    PT Eval/Re-eval Minutes 62  -AJ          Total Minutes    Untimed Charges Total Minutes 62  -AJ       Total Minutes 62  -AJ                User Key  (r) = Recorded By, (t) = Taken By, (c) = Cosigned By      Initials Name Provider Type    Pierre Ware, PT DPT Physical Therapist                  Therapy Charges for Today       Code Description Service Date Service Provider Modifiers Qty    46774212676 HC PT EVAL MOD COMPLEXITY 4 8/20/2024 Pierre Land PT DPT GP 1            PT G-Codes  Outcome Measure Options: AM-PAC 6 Clicks Basic Mobility (PT)  AM-PAC 6 Clicks Score (PT): 20  PT Discharge Summary  Anticipated Discharge Disposition (PT): home with home health    Pierre Land PT DPTAMI  8/20/2024

## 2024-08-21 ENCOUNTER — DOCUMENTATION (OUTPATIENT)
Dept: HOME HEALTH SERVICES | Facility: HOME HEALTHCARE | Age: 74
End: 2024-08-21
Payer: MEDICARE

## 2024-08-21 ENCOUNTER — HOME HEALTH ADMISSION (OUTPATIENT)
Dept: HOME HEALTH SERVICES | Facility: HOME HEALTHCARE | Age: 74
End: 2024-08-21
Payer: MEDICARE

## 2024-08-21 VITALS
OXYGEN SATURATION: 97 % | WEIGHT: 191 LBS | SYSTOLIC BLOOD PRESSURE: 143 MMHG | BODY MASS INDEX: 28.95 KG/M2 | HEART RATE: 80 BPM | TEMPERATURE: 98 F | DIASTOLIC BLOOD PRESSURE: 75 MMHG | HEIGHT: 68 IN | RESPIRATION RATE: 18 BRPM

## 2024-08-21 LAB
ALBUMIN SERPL-MCNC: 3.6 G/DL (ref 3.5–5.2)
ALBUMIN/GLOB SERPL: 1.2 G/DL
ALP SERPL-CCNC: 71 U/L (ref 39–117)
ALT SERPL W P-5'-P-CCNC: 13 U/L (ref 1–41)
ANION GAP SERPL CALCULATED.3IONS-SCNC: 10 MMOL/L (ref 5–15)
AST SERPL-CCNC: 20 U/L (ref 1–40)
BILIRUB SERPL-MCNC: 0.4 MG/DL (ref 0–1.2)
BUN SERPL-MCNC: 29 MG/DL (ref 8–23)
BUN/CREAT SERPL: 18.8 (ref 7–25)
CALCIUM SPEC-SCNC: 8.6 MG/DL (ref 8.6–10.5)
CHLORIDE SERPL-SCNC: 106 MMOL/L (ref 98–107)
CO2 SERPL-SCNC: 25 MMOL/L (ref 22–29)
CREAT SERPL-MCNC: 1.54 MG/DL (ref 0.76–1.27)
EGFRCR SERPLBLD CKD-EPI 2021: 47 ML/MIN/1.73
GLOBULIN UR ELPH-MCNC: 2.9 GM/DL
GLUCOSE SERPL-MCNC: 106 MG/DL (ref 65–99)
POTASSIUM SERPL-SCNC: 3.5 MMOL/L (ref 3.5–5.2)
PROT SERPL-MCNC: 6.5 G/DL (ref 6–8.5)
SODIUM SERPL-SCNC: 141 MMOL/L (ref 136–145)

## 2024-08-21 PROCEDURE — 25810000003 LACTATED RINGERS PER 1000 ML: Performed by: FAMILY MEDICINE

## 2024-08-21 PROCEDURE — 96361 HYDRATE IV INFUSION ADD-ON: CPT

## 2024-08-21 PROCEDURE — G0378 HOSPITAL OBSERVATION PER HR: HCPCS

## 2024-08-21 PROCEDURE — 80053 COMPREHEN METABOLIC PANEL: CPT | Performed by: FAMILY MEDICINE

## 2024-08-21 RX ORDER — POTASSIUM CHLORIDE 750 MG/1
40 CAPSULE, EXTENDED RELEASE ORAL EVERY 4 HOURS
Status: DISCONTINUED | OUTPATIENT
Start: 2024-08-21 | End: 2024-08-21 | Stop reason: HOSPADM

## 2024-08-21 RX ORDER — OXYCODONE HYDROCHLORIDE 5 MG/1
5 TABLET ORAL EVERY 6 HOURS PRN
Qty: 20 TABLET | Refills: 0 | Status: SHIPPED | OUTPATIENT
Start: 2024-08-21 | End: 2024-08-26

## 2024-08-21 RX ADMIN — OXYCODONE HYDROCHLORIDE 5 MG: 5 TABLET ORAL at 03:02

## 2024-08-21 RX ADMIN — DOCUSATE SODIUM 50 MG AND SENNOSIDES 8.6 MG 2 TABLET: 8.6; 5 TABLET, FILM COATED ORAL at 08:20

## 2024-08-21 RX ADMIN — OXYCODONE HYDROCHLORIDE 5 MG: 5 TABLET ORAL at 08:53

## 2024-08-21 RX ADMIN — POTASSIUM CHLORIDE 40 MEQ: 750 CAPSULE, EXTENDED RELEASE ORAL at 08:20

## 2024-08-21 RX ADMIN — LOSARTAN POTASSIUM 50 MG: 50 TABLET, FILM COATED ORAL at 08:20

## 2024-08-21 RX ADMIN — GABAPENTIN 300 MG: 300 CAPSULE ORAL at 05:44

## 2024-08-21 RX ADMIN — SODIUM CHLORIDE, POTASSIUM CHLORIDE, SODIUM LACTATE AND CALCIUM CHLORIDE 125 ML/HR: 600; 310; 30; 20 INJECTION, SOLUTION INTRAVENOUS at 02:54

## 2024-08-21 NOTE — THERAPY DISCHARGE NOTE
Acute Care - Physical Therapy Discharge Summary  [facility]       Patient Name: Frederic Palacios  : 1950  MRN: [id number]    Today's Date: 2024                 Admit Date: 2024      PT Recommendation and Plan    Visit Dx:    ICD-10-CM ICD-9-CM   1. Closed fracture of multiple ribs of left side, initial encounter  S22.42XA 807.09   2. Fall from ladder, initial encounter  W11.XXXA E881.0   3. Neck sprain, initial encounter  S13.9XXA 847.0   4. Thoracic sprain  S23.9XXA 847.1   5. Lumbar sprain, initial encounter  S33.5XXA 847.2   6. Lumbar degenerative disc disease  M51.36 722.52   7. Thoracic degenerative disc disease  M51.34 722.51   8. Contusion of right side of back, initial encounter  S20.221A 922.31   9. Concussion with loss of consciousness, initial encounter  S06.0X9A 850.5   10. Laceration of scalp, initial encounter  S01.01XA 873.0   11. Intractable pain  R52 780.96   12. Abrasion of scalp, initial encounter  S00.01XA 910.0   13. Impaired mobility [Z74.09]  Z74.09 799.89   14. Concussion with loss of consciousness of 30 minutes or less, initial encounter  S06.0X1A 850.11                PT Rehab Goals       Row Name 24 1100             Bed Mobility Goal 1 (PT)    Activity/Assistive Device (Bed Mobility Goal 1, PT) rolling to left;rolling to right;sit to supine;supine to sit  -AB      [place] Level/Cues Needed (Bed Mobility Goal 1, PT) supervision required  -AB      Time Frame (Bed Mobility Goal 1, PT) long term goal (LTG);10 days  -AB      Strategies/Barriers (Bed Mobility Goal 1, PT) pain less than 5/10  -AB      Progress/Outcomes (Bed Mobility Goal 1, PT) goal not met  -AB         Transfer Goal 1 (PT)    Activity/Assistive Device (Transfer Goal 1, PT) sit-to-stand/stand-to-sit;bed-to-chair/chair-to-bed;toilet;walk-in shower  -AB      [place] Level/Cues Needed (Transfer Goal 1, PT) standby assist;supervision required  -AB      Time Frame (Transfer Goal 1, PT) long term goal  (LTG);10 days  -AB      Strategies/Barriers (Transfers Goal 1, PT) pain less than 5/10  -AB      Progress/Outcome (Transfer Goal 1, PT) goal not met  -AB         Gait Training Goal 1 (PT)    Activity/Assistive Device (Gait Training Goal 1, PT) gait (walking locomotion);decrease asymmetrical patterns;decrease fall risk;diminish gait deviation;forward stepping;improve balance and speed;increase endurance/gait distance;increase energy conservation;assistive device use;walker, rolling  -AB      [place] Level (Gait Training Goal 1, PT) independent  -AB      Distance (Gait Training Goal 1, PT) 500' with pain less than 4/10  -AB      Time Frame (Gait Training Goal 1, PT) long term goal (LTG);10 days  -AB      Progress/Outcome (Gait Training Goal 1, PT) goal not met  -AB         Stairs Goal 1 (PT)    Activity/Assistive Device (Stairs Goal 1, PT) stairs, all skills;ascending stairs;descending stairs;step-to-step;decrease fall risk;improve balance and speed  -AB      [place] Level/Cues Needed (Stairs Goal 1, PT) contact guard required  -AB      Number of Stairs (Stairs Goal 1, PT) 2 as needed for home and pain less than 4/10  -AB      Time Frame (Stairs Goal 1, PT) long term goal (LTG);10 days  -AB      Progress/Outcome (Stairs Goal 1, PT) goal not met  -AB                User Key  (r) = Recorded By, (t) = Taken By, (c) = Cosigned By      Initials Name Provider Type Discipline    Elizabeth Galeas PTA Physical Therapist Assistant PT                        PT Discharge Summary  Anticipated Discharge Disposition (PT): home with home health  Reason for Discharge: Discharge from facility  Outcomes Achieved: Refer to plan of care for updates on goals achieved  Discharge Destination: Home with home health      Elizabeth Nelson PTA   8/21/2024

## 2024-08-21 NOTE — PROGRESS NOTES
Received HH referral from Dr. Tong at [facility]. Unable to reach pt. Spoke with alternate , Nga, who wishes to be primary contact. Will see pt tomorrow, 8/22, for SN SOC.

## 2024-08-21 NOTE — DISCHARGE SUMMARY
Hospital Discharge Summary    Frederic Palacios  :  1950  MRN:  [id number]    Admit date:  2024  Discharge date: 2024    Admitting Physician:    Evert Tong MD    Discharge Diagnoses:      Rib fractures    Hypertension    KAIT (acute kidney injury)    Stage 3b chronic kidney disease (CKD)    Concussion with loss of consciousness of 30 minutes or less      Hospital Course:   74-year-old gentleman presented after a fall from a ladder 10 feet high and had loss of consciousness.  Signs and symptoms consistent with concussion as well as he had 2 right-sided rib fractures.  Admitted for pain control and physical therapy evaluation.  He was able to ambulate 600 feet with physical therapy and pain was reasonably well-controlled with modest dose of oxycodone.  He will be discharged home with home health PT and nursing as he lives alone.  Initially he requested rehab but would not qualify based on his ability to function with PT.  He did have a mild KAIT on admission but creatinine returned to baseline after IV fluids.  Felt to be prerenal related to dehydration.    The patient was admitted for the above noted medical/surgical issues. Please see daily progress note for further details concerning their stay. The patient improved throughout their stay and reached maximum medical improvement on the day of discharge. The patient/family agree with the treatment plan as outlined above. All questions concerning their stay were answered prior to discharge. They understand the importance of follow up concerning any abnormal test results.     Physical Exam  Constitutional:       Appearance: He is well-developed.   HENT:      Head: Normocephalic and atraumatic.   Eyes:      Conjunctiva/sclera: Conjunctivae normal.      Pupils: Pupils are equal, round, and reactive to light.   Cardiovascular:      Rate and Rhythm: Normal rate and regular rhythm.      Heart sounds: Normal heart sounds. No murmur heard.     No  friction rub.   Pulmonary:      Effort: Pulmonary effort is normal. No respiratory distress.      Breath sounds: Normal breath sounds. No wheezing or rales.   Chest:      Chest wall: Tenderness present.   Abdominal:      General: Bowel sounds are normal. There is no distension.      Palpations: Abdomen is soft.      Tenderness: There is no abdominal tenderness.   Musculoskeletal:         General: Normal range of motion.      Cervical back: Normal range of motion.   Skin:     Capillary Refill: Capillary refill takes less than 2 seconds.   Neurological:      Mental Status: He is alert and oriented to person, place, and time.      Cranial Nerves: No cranial nerve deficit.   Psychiatric:         Behavior: Behavior normal.           Discharge Medications:         Discharge Medications        New Medications        Instructions Start Date   oxyCODONE 5 MG immediate release tablet  Commonly known as: ROXICODONE   5 mg, Oral, Every 6 Hours PRN             Continue These Medications        Instructions Start Date   albuterol sulfate  (90 Base) MCG/ACT inhaler  Commonly known as: PROVENTIL HFA;VENTOLIN HFA;PROAIR HFA   2 puffs, Inhalation, Every 4 Hours PRN      DULoxetine 30 MG capsule  Commonly known as: CYMBALTA   30 mg, Oral, Daily      gabapentin 800 MG tablet  Commonly known as: NEURONTIN   800 mg, Oral, 3 Times Daily, for 30 days.      losartan 50 MG tablet  Commonly known as: COZAAR   1 tablet, Oral, Daily      meloxicam 15 MG tablet  Commonly known as: MOBIC   15 mg, Oral, Daily, prn      nortriptyline 10 MG capsule  Commonly known as: PAMELOR   1 capsule, Oral, Nightly PRN      rosuvastatin 5 MG tablet  Commonly known as: CRESTOR   1 tablet, Oral, Daily      Spiriva Respimat 2.5 MCG/ACT aerosol solution inhaler  Generic drug: tiotropium bromide monohydrate   2 puffs, Inhalation, Daily - RT      triamterene-hydrochlorothiazide 37.5-25 MG per tablet  Commonly known as: MAXZIDE-25   1 capsule, Oral, Every  Morning      Valtrex 1000 MG tablet  Generic drug: valACYclovir   1,000 mg, Oral, Daily               Activity: Ad gabino.    Diet: Ad gabino.    Consults: PT/OT    Significant Diagnostic Studies:    CT Abdomen Pelvis With Contrast    Result Date: 8/19/2024  1. There are nondisplaced rib fractures on the right. There is atelectasis in both lung bases. There is no evidence of pneumothorax. 2. No solid organ injury or hemoperitoneum is seen. 3. Liver and renal cysts. There are probable hyperdense hemorrhagic or proteinaceous cyst on the right side that appears stable in size compared back to 9/23/2023. Enhancing lesions are not fully excluded on this exam as no noncontrast exam was obtained. Nonemergent follow-up ultrasound could be performed to evaluate the hyperdense right renal lesions further. 4. Degenerative changes of the visualized spine and hips. Atheromatous disease of the aortoiliac vessels.   The full report of this exam was immediately signed and available to the emergency room. The patient is currently in the emergency room.  This report was signed and finalized on 8/19/2024 3:02 PM by Dr. Justin Izquierdo MD.      CT Chest With Contrast Diagnostic    Result Date: 8/19/2024  1. Bibasilar atelectasis. Lungs are otherwise clear with no evidence of pulmonary contusion or pneumothorax. No acute infiltrate. No evidence of mediastinal hematoma. 2. Nondisplaced fractures of the right anterolateral fifth and sixth ribs. There is spondylosis in the mid and upper thoracic spine with an accentuated thoracic kyphosis. No paraspinal hematoma demonstrated. 3. Cortical cysts of the upper pole of the kidneys. There is a hepatic cyst within the left lobe of the liver. A small hiatal hernia is present..    This report was signed and finalized on 8/19/2024 2:51 PM by Dr. Hakan Guzman MD.      CT Lumbar Spine Without Contrast    Result Date: 8/19/2024   1.  Degenerative changes in the lumbar spine but no definite acute osseous  abnormality. Please see the report above for details regarding thecal sac and foraminal narrowing.   This report was signed and finalized on 8/19/2024 2:45 PM by Dr. Rj Freedman MD.      CT Thoracic Spine Without Contrast    Result Date: 8/19/2024  . 1. No evidence of acute fracture or listhesis. 2. Degenerative disc disease and spondylosis in the mid and upper thoracic spine with an accentuated thoracic kyphosis.   This report was signed and finalized on 8/19/2024 2:42 PM by Dr. Hakan Guzman MD.      CT Cervical Spine Without Contrast    Result Date: 8/19/2024  1. No evidence of acute osseous injury or malalignment in the cervical spine.  2. Central and foraminal stenosis at multiple levels in the cervical spine as described above.    This report was signed and finalized on 8/19/2024 2:39 PM by Dr. Hakan Guzman MD.      CT Head Without Contrast    Result Date: 8/19/2024  Mild cerebral and cerebellar atrophy with chronic microvascular disease but no evidence of acute intracranial process.    This report was signed and finalized on 8/19/2024 2:36 PM by Dr. Hakan Guzman MD.      CT Abdomen Pelvis With & Without Contrast    Result Date: 8/13/2024   1.  No acute findings in the abdomen or pelvis.  2.  Mild colonic diverticulosis without evidence of diverticulitis. Moderate volume stool throughout the colon.  This report was signed and finalized on 8/13/2024 10:30 AM by Dr. Bishop Pugh MD.            Treatments:   Pain control    Disposition:   Home in stable condition    Time spent on discharge including discussion with patient/family, SW, and coordination of care.     Follow up with Evert Togn MD in 1 weeks.    Signed:  Evert Tong MD   8/21/2024, 07:13 CDT

## 2024-08-21 NOTE — NURSING NOTE
Pt D/C home today. Went over AVS with pt. Educated on side effect of constipation when taking pain medication. IV's removed. Belongings sent home with pt.

## 2024-08-21 NOTE — PLAN OF CARE
"Goal Outcome Evaluation:  Plan of Care Reviewed With: patient        Progress: improving  Outcome Evaluation: A&Ox4, RA, VSS. Up x1 w/ walker. Pt pain increases when trying to get up, PRN pain medicaitons given. Pt stated \"I want to sleep in the chair it doesn't make my ribs hurt as much.\" Pt stated \"My ribs don't hurt when I stand up and walk.\" Call light within reach.                               "

## 2024-08-22 ENCOUNTER — HOME CARE VISIT (OUTPATIENT)
Dept: HOME HEALTH SERVICES | Facility: CLINIC | Age: 74
End: 2024-08-22
Payer: MEDICARE

## 2024-08-22 PROCEDURE — G0299 HHS/HOSPICE OF RN EA 15 MIN: HCPCS

## 2024-08-23 VITALS
OXYGEN SATURATION: 97 % | WEIGHT: 183 LBS | SYSTOLIC BLOOD PRESSURE: 107 MMHG | DIASTOLIC BLOOD PRESSURE: 60 MMHG | TEMPERATURE: 99.5 F | HEART RATE: 76 BPM | RESPIRATION RATE: 18 BRPM | BODY MASS INDEX: 27.74 KG/M2 | HEIGHT: 68 IN

## 2024-08-23 NOTE — HOME HEALTH
"SOC Note: 74 year old male recently hospitalized at [facility] for multiple rib fracture d/t fall and discharged home 8/21/24.  Patient is alert and orientated and able to make his needs known.  Medication reviewed with patient and caregiver, voicing understanding of dosage and frequency of new pain medication.  Patient reports pain to ribs 8/10 prior to taking medication.  Patient noted with some guarding of upper chest area d/t pain.  Patient using rollator to ambuate throughout home.     Home Health ordered for: disciplines  SN, PT    Reason for Hosp/Primary Dx/Co-morbidities: Rib fractures from fall, COPD, neuropathy    Focus of Care: closed fracture of multiple ribs    Patient's goal(s): \"less pain and move easier\"    Current Functional status/mobility/DME:  rollator    HB status/Living Arrangements: lives alone    Skin Integrity/wound status: skin intact, no breakdown noted    Code Status:  full code    Fall Risk/Safety concerns: weakness, pain, poor safety awareness    Educated on Emergency Plan, steps to take prior to going to the ER and when to Call [facility]:  instrcuted at visit    Medication issues/Concerns:none at this time    Additional Problems/Concerns: none    SDOH Barriers (i.e. caregiver concerns, social isolation, transportation, food insecurity, environment, income etc.)/Need for MSW: none needed"

## 2024-08-23 NOTE — CASE COMMUNICATION
"SOC Note: 74 year old male recently hospitalized at [facility] for multiple rib fracture d/t fall and discharged home 8/21/24.  Patient is alert and orientated and able to make his needs known.  Medication reviewed with patient and caregiver, voicing understanding of dosage and frequency of new pain medication.  Patient reports pain to ribs 8/10 prior to taking medication.  Patient noted with some guarding of upper chest area d/t pain.  Humberto alas using rollator to ambuate throughout home.     Home Health ordered for: disciplines  SN, PT    Reason for Hosp/Primary Dx/Co-morbidities: Rib fractures from fall, COPD, neuropathy    Focus of Care: closed fracture of multiple ribs    Patient's goal(s): \"less pain and move easier\"    Current Functional status/mobility/DME:  rollator    HB status/Living Arrangements: lives alone    Skin Integrity/wound status: skin intact, no breakd own noted    Code Status:  full code    Fall Risk/Safety concerns: weakness, pain, poor safety awareness    Educated on Emergency Plan, steps to take prior to going to the ER and when to Call [facility]:  instrcuted at visit    Medication issues/Concerns:none at this time    Additional Problems/Concerns: none    SDOH Barriers (i.e. caregiver concerns, social isolation, transportation, food insecurity, environment, income etc.)/Ne ed for MSW: none needed"

## 2024-08-26 ENCOUNTER — HOME CARE VISIT (OUTPATIENT)
Dept: HOME HEALTH SERVICES | Facility: CLINIC | Age: 74
End: 2024-08-26
Payer: MEDICARE

## 2024-08-26 PROCEDURE — G0151 HHCP-SERV OF PT,EA 15 MIN: HCPCS

## 2024-08-27 ENCOUNTER — HOME CARE VISIT (OUTPATIENT)
Dept: HOME HEALTH SERVICES | Facility: CLINIC | Age: 74
End: 2024-08-27
Payer: MEDICARE

## 2024-08-27 VITALS
TEMPERATURE: 97.7 F | OXYGEN SATURATION: 96 % | RESPIRATION RATE: 18 BRPM | SYSTOLIC BLOOD PRESSURE: 124 MMHG | HEART RATE: 76 BPM | DIASTOLIC BLOOD PRESSURE: 64 MMHG

## 2024-08-27 NOTE — CASE COMMUNICATION
Reason for Hosp/Primary Dx :  75 yo male dx ; Fall, rib fractures . Pt reports fall from ladder 8-19-24     referral orders : Fall, rib fractures    Focus of Care: deconditioning, bed mobility , transfers, gait, safety per Fall, rib fractures    - pt declines formal tx and agrees to 1 followup and reassess     Current Functional status/mobility/DME : See DME    HB status/Living Arrangements:   pt requires A for mobility and pt safety     pt lived alone ( 24/7 prn )  3 step entry - pt caprenter and working on home     Skin Integrity/wound status: na    Code Status: FULL CODE    Fall Risk: high risk    PmHx:  COPD     PLOF : community amb -

## 2024-08-28 NOTE — CASE COMMUNICATION
SN VISIT MISSED ON 8- PTS WIFE STATED THIS WAS NOT A GOOD DAY FOR A VISIT AS PT WAS VERY BUSY WILL FOLLOW UP AS NORMALLY SCHEDULED

## 2024-08-29 ENCOUNTER — HOME CARE VISIT (OUTPATIENT)
Dept: HOME HEALTH SERVICES | Facility: CLINIC | Age: 74
End: 2024-08-29
Payer: MEDICARE

## 2024-08-29 PROCEDURE — G0493 RN CARE EA 15 MIN HH/HOSPICE: HCPCS

## 2024-08-30 VITALS
OXYGEN SATURATION: 96 % | SYSTOLIC BLOOD PRESSURE: 142 MMHG | RESPIRATION RATE: 18 BRPM | DIASTOLIC BLOOD PRESSURE: 72 MMHG | HEART RATE: 77 BPM | TEMPERATURE: 96.8 F

## 2024-09-03 ENCOUNTER — HOME CARE VISIT (OUTPATIENT)
Dept: HOME HEALTH SERVICES | Facility: CLINIC | Age: 74
End: 2024-09-03
Payer: MEDICARE

## 2024-09-03 VITALS
OXYGEN SATURATION: 97 % | DIASTOLIC BLOOD PRESSURE: 60 MMHG | RESPIRATION RATE: 18 BRPM | TEMPERATURE: 97.2 F | HEART RATE: 88 BPM | SYSTOLIC BLOOD PRESSURE: 112 MMHG

## 2024-09-03 PROCEDURE — G0151 HHCP-SERV OF PT,EA 15 MIN: HCPCS

## 2024-09-03 NOTE — CASE COMMUNICATION
pt agrees to  PT dc with  HEP and family A prn   pt reports compliance HEP   pt declines need for outpt rehab services       Reason for Hosp/Primary Dx : 73 yo male dx ; Fall, rib fractures . Pt reports fall from ladder 8-19-24   PmHx: COPD   PLOF : community [facility] -

## 2024-09-05 ENCOUNTER — HOME CARE VISIT (OUTPATIENT)
Dept: HOME HEALTH SERVICES | Facility: CLINIC | Age: 74
End: 2024-09-05
Payer: MEDICARE

## 2024-09-05 PROCEDURE — G0493 RN CARE EA 15 MIN HH/HOSPICE: HCPCS

## 2024-09-05 NOTE — Clinical Note
Discharge Summary/Summary of Care Provided: rib fractures, disease management, pain management, medication management, fall prevention  Patient received home health for diagnosis: rib fractures  Current level of functional ability: independent  Living arrangements: lives alone  Progress towards goals and/or Were all goals met? met  If not all goals met, barriers that prevented patient from meeting goals: na  SDOH concerns (i.e. Caregiver availability, social isolation, environment, income, transportation access, food insecurity etc.) na  Follow-up appointment plans and community resources provided: discussed at discharge visit, patient voices will keep all upcoming appointments
no dysuria, no frequency, and no hematuria.  No vaginal bleeding / spotting at present.

## 2024-09-06 VITALS
DIASTOLIC BLOOD PRESSURE: 70 MMHG | RESPIRATION RATE: 16 BRPM | TEMPERATURE: 97 F | SYSTOLIC BLOOD PRESSURE: 142 MMHG | HEART RATE: 76 BPM | OXYGEN SATURATION: 96 %

## 2024-09-06 NOTE — HOME HEALTH
Discharge Summary/Summary of Care Provided: rib fractures, disease management, pain management, medication management, fall prevention  Patient received home health for diagnosis: rib fractures  Current level of functional ability: independent  Living arrangements: lives alone  Progress towards goals and/or Were all goals met? met  If not all goals met, barriers that prevented patient from meeting goals: na  SDOH concerns (i.e. Caregiver availability, social isolation, environment, income, transportation access, food insecurity etc.) na  Follow-up appointment plans and community resources provided: discussed at discharge visit, patient voices will keep all upcoming appointments

## 2024-09-30 ENCOUNTER — TRANSCRIBE ORDERS (OUTPATIENT)
Dept: ADMINISTRATIVE | Facility: HOSPITAL | Age: 74
End: 2024-09-30
Payer: MEDICARE

## 2024-09-30 ENCOUNTER — HOSPITAL ENCOUNTER (OUTPATIENT)
Dept: GENERAL RADIOLOGY | Facility: HOSPITAL | Age: 74
Discharge: HOME | End: 2024-09-30
Admitting: FAMILY MEDICINE
Payer: MEDICARE

## 2024-09-30 DIAGNOSIS — S22.41XD MULTIPLE FRACTURES OF RIBS OF RIGHT SIDE WITH ROUTINE HEALING: Primary | ICD-10-CM

## 2024-09-30 DIAGNOSIS — S22.41XD MULTIPLE FRACTURES OF RIBS OF RIGHT SIDE WITH ROUTINE HEALING: ICD-10-CM

## 2024-09-30 PROCEDURE — 71046 X-RAY EXAM CHEST 2 VIEWS: CPT

## 2024-09-30 NOTE — PROGRESS NOTES
Subjective    Mr. Palacios is 74 y.o. male    Chief Complaint: Right renal cyst    History of Present Illness    Patient here for renal mass.  CT 9/23 showed indeterminate cystic lesion right posterior mid kidney.  CT done for vomiting.  Denies flank pain or gross hematuria.       AUA 14/35 with incomplete emptying, frequency, intermittency, urgency, weak stream, nocturia X 1. Patient previously saw Dr. Griffith  for low risk prostate cancer and underwent XRT completed in 2017. PSA followed by PCP. Las PSA to review in chart 2020 0.84.    The following portions of the patient's history were reviewed and updated as appropriate: allergies, current medications, past family history, past medical history, past social history, past surgical history and problem list.    Review of Systems   Constitutional:  Negative for chills and fever.   Gastrointestinal:  Negative for abdominal pain, anal bleeding and blood in stool.   Genitourinary:  Positive for dysuria and flank pain. Negative for hematuria.   Musculoskeletal:  Positive for back pain.         Current Outpatient Medications:     albuterol sulfate  (90 Base) MCG/ACT inhaler, Inhale 2 puffs Every 4 (Four) Hours As Needed for Wheezing., Disp: 18 g, Rfl: 11    DULoxetine (CYMBALTA) 30 MG capsule, Take 1 capsule by mouth Daily. Indications: Diabetes with Nerve Disease, Disp: , Rfl:     gabapentin (NEURONTIN) 800 MG tablet, Take 1 tablet by mouth 3 (Three) Times a Day. for 30 days.  Indications: Diabetes with Nerve Disease, Disp: , Rfl:     losartan (COZAAR) 50 MG tablet, Take 1 tablet by mouth Daily. Indications: High Blood Pressure, Disp: , Rfl:     meloxicam (MOBIC) 15 MG tablet, Take 1 tablet by mouth Daily. prn  Indications: Joint Damage causing Pain and Loss of Function, Disp: , Rfl:     nortriptyline (PAMELOR) 10 MG capsule, Take 1 capsule by mouth At Night As Needed for Sleep. Indications: Trouble Sleeping, Disp: , Rfl:     rosuvastatin (CRESTOR) 5 MG tablet,  "Take 1 tablet by mouth Daily. Indications: High Amount of Fats in the Blood, Disp: , Rfl:     tiotropium bromide monohydrate (Spiriva Respimat) 2.5 MCG/ACT aerosol solution inhaler, Inhale 2 puffs Daily. Indications: Chronic Obstructive Lung Disease, Disp: , Rfl:     triamterene-hydrochlorothiazide (MAXZIDE-25) 37.5-25 MG per tablet, Take 1 tablet by mouth Every Morning. Indications: High Blood Pressure, Disp: , Rfl:     valACYclovir (Valtrex) 1000 MG tablet, Take 1 tablet by mouth Daily., Disp: , Rfl:     Past Medical History:   Diagnosis Date    History of radiation therapy 01/03/2017    7560 cGy to prostate    Hypertension     Prostate cancer        Past Surgical History:   Procedure Laterality Date    COLONOSCOPY  07/08/2010    normal    COLONOSCOPY  01/29/2004    small polyp at 25 cm,otherwise normal colonoscopy    COLONOSCOPY N/A 2/16/2018    Procedure: COLONOSCOPY WITH ANESTHESIA;  Surgeon: Abhijeet Alonso DO;  Location: [facility] ENDOSCOPY;  Service:     ENDOSCOPY  01/29/2004    gastritis    PROSTATE BIOPSY         Social History     Socioeconomic History    Marital status: Single   Tobacco Use    Smoking status: Never    Smokeless tobacco: Never   Vaping Use    Vaping status: Never Used   Substance and Sexual Activity    Alcohol use: No    Drug use: No    Sexual activity: Not Currently       Family History   Problem Relation Age of Onset    COPD Father     Colon cancer Father     Colon cancer Maternal Grandmother        Objective    Temp 97.6 °F (36.4 °C)   Ht 170.2 cm (67.01\")   Wt 84.8 kg (187 lb)   BMI 29.28 kg/m²     Physical Exam  Constitutional:       Appearance: Normal appearance.   Abdominal:      Tenderness: There is no right CVA tenderness or left CVA tenderness.   Skin:     General: Skin is warm and dry.   Neurological:      Mental Status: He is alert and oriented to person, place, and time.   Psychiatric:         Mood and Affect: Mood normal.         Behavior: Behavior normal. "             Results for orders placed or performed during the hospital encounter of 08/19/24   Comprehensive Metabolic Panel    Specimen: Blood   Result Value Ref Range    Glucose 133 (H) 65 - 99 mg/dL    BUN 32 (H) 8 - 23 mg/dL    Creatinine 2.20 (H) 0.76 - 1.27 mg/dL    Sodium 144 136 - 145 mmol/L    Potassium 4.3 3.5 - 5.2 mmol/L    Chloride 109 (H) 98 - 107 mmol/L    CO2 22.0 22.0 - 29.0 mmol/L    Calcium 9.2 8.6 - 10.5 mg/dL    Total Protein 7.1 6.0 - 8.5 g/dL    Albumin 3.9 3.5 - 5.2 g/dL    ALT (SGPT) 22 1 - 41 U/L    AST (SGOT) 33 1 - 40 U/L    Alkaline Phosphatase 82 39 - 117 U/L    Total Bilirubin 0.3 0.0 - 1.2 mg/dL    Globulin 3.2 gm/dL    A/G Ratio 1.2 g/dL    BUN/Creatinine Ratio 14.5 7.0 - 25.0    Anion Gap 13.0 5.0 - 15.0 mmol/L    eGFR 30.7 (L) >60.0 mL/min/1.73   Lipase    Specimen: Blood   Result Value Ref Range    Lipase 35 13 - 60 U/L   Protime-INR    Specimen: Blood   Result Value Ref Range    Protime 12.7 11.8 - 14.8 Seconds    INR 0.92 0.91 - 1.09   Ethanol    Specimen: Blood   Result Value Ref Range    Ethanol % <0.010 %   CBC Auto Differential    Specimen: Blood   Result Value Ref Range    WBC 7.31 3.40 - 10.80 10*3/mm3    RBC 4.33 4.14 - 5.80 10*6/mm3    Hemoglobin 11.9 (L) 13.0 - 17.7 g/dL    Hematocrit 37.0 (L) 37.5 - 51.0 %    MCV 85.5 79.0 - 97.0 fL    MCH 27.5 26.6 - 33.0 pg    MCHC 32.2 31.5 - 35.7 g/dL    RDW 14.1 12.3 - 15.4 %    RDW-SD 43.9 37.0 - 54.0 fl    MPV 10.6 6.0 - 12.0 fL    Platelets 273 140 - 450 10*3/mm3    Neutrophil % 62.1 42.7 - 76.0 %    Lymphocyte % 26.4 19.6 - 45.3 %    Monocyte % 6.7 5.0 - 12.0 %    Eosinophil % 3.0 0.3 - 6.2 %    Basophil % 0.8 0.0 - 1.5 %    Immature Grans % 1.0 (H) 0.0 - 0.5 %    Neutrophils, Absolute 4.54 1.70 - 7.00 10*3/mm3    Lymphocytes, Absolute 1.93 0.70 - 3.10 10*3/mm3    Monocytes, Absolute 0.49 0.10 - 0.90 10*3/mm3    Eosinophils, Absolute 0.22 0.00 - 0.40 10*3/mm3    Basophils, Absolute 0.06 0.00 - 0.20 10*3/mm3    Immature Grans,  Absolute 0.07 (H) 0.00 - 0.05 10*3/mm3    nRBC 0.0 0.0 - 0.2 /100 WBC   Comprehensive Metabolic Panel    Specimen: Blood   Result Value Ref Range    Glucose 99 65 - 99 mg/dL    BUN 33 (H) 8 - 23 mg/dL    Creatinine 1.95 (H) 0.76 - 1.27 mg/dL    Sodium 143 136 - 145 mmol/L    Potassium 3.9 3.5 - 5.2 mmol/L    Chloride 107 98 - 107 mmol/L    CO2 25.0 22.0 - 29.0 mmol/L    Calcium 9.7 8.6 - 10.5 mg/dL    Total Protein 7.3 6.0 - 8.5 g/dL    Albumin 3.9 3.5 - 5.2 g/dL    ALT (SGPT) 18 1 - 41 U/L    AST (SGOT) 26 1 - 40 U/L    Alkaline Phosphatase 83 39 - 117 U/L    Total Bilirubin 0.5 0.0 - 1.2 mg/dL    Globulin 3.4 gm/dL    A/G Ratio 1.1 g/dL    BUN/Creatinine Ratio 16.9 7.0 - 25.0    Anion Gap 11.0 5.0 - 15.0 mmol/L    eGFR 35.4 (L) >60.0 mL/min/1.73   Comprehensive Metabolic Panel    Specimen: Blood   Result Value Ref Range    Glucose 106 (H) 65 - 99 mg/dL    BUN 29 (H) 8 - 23 mg/dL    Creatinine 1.54 (H) 0.76 - 1.27 mg/dL    Sodium 141 136 - 145 mmol/L    Potassium 3.5 3.5 - 5.2 mmol/L    Chloride 106 98 - 107 mmol/L    CO2 25.0 22.0 - 29.0 mmol/L    Calcium 8.6 8.6 - 10.5 mg/dL    Total Protein 6.5 6.0 - 8.5 g/dL    Albumin 3.6 3.5 - 5.2 g/dL    ALT (SGPT) 13 1 - 41 U/L    AST (SGOT) 20 1 - 40 U/L    Alkaline Phosphatase 71 39 - 117 U/L    Total Bilirubin 0.4 0.0 - 1.2 mg/dL    Globulin 2.9 gm/dL    A/G Ratio 1.2 g/dL    BUN/Creatinine Ratio 18.8 7.0 - 25.0    Anion Gap 10.0 5.0 - 15.0 mmol/L    eGFR 47.0 (L) >60.0 mL/min/1.73   Type & Screen    Specimen: Blood   Result Value Ref Range    ABO Type O     RH type Positive     Antibody Screen Negative     T&S Expiration Date 8/22/2024 11:59:59 PM    Green Top (Gel)   Result Value Ref Range    Extra Tube Hold for add-ons.    Lavender Top   Result Value Ref Range    Extra Tube hold for add-on    Red Top   Result Value Ref Range    Extra Tube Hold for add-ons.    Light Blue Top   Result Value Ref Range    Extra Tube Hold for add-ons.    CT Abdomen Pelvis With & Without  Contrast (08/13/2024 09:09) CT Abdomen Pelvis With Contrast (08/19/2024 14:17)   Assessment and Plan    Diagnoses and all orders for this visit:    1. Renal lesion (Primary)  -     Cancel: POC Urinalysis Dipstick, Multipro  -     US Renal Bilateral; Future      Repeat CT showing right renal cyst stable no significant growth measuring 1.4 cm.  Area appears to be proteinaceous/hemorrhagic cyst.  Based on this recommend following 1 last time in 1 year at this time given patient has had multiple CTs back-to-back we will just do a renal ultrasound

## 2024-10-04 ENCOUNTER — HOSPITAL ENCOUNTER (OUTPATIENT)
Dept: CT IMAGING | Facility: HOSPITAL | Age: 74
Discharge: HOME | End: 2024-10-04
Payer: MEDICARE

## 2024-10-08 ENCOUNTER — TELEPHONE (OUTPATIENT)
Dept: UROLOGY | Facility: CLINIC | Age: 74
End: 2024-10-08
Payer: MEDICARE

## 2024-10-08 NOTE — TELEPHONE ENCOUNTER
Called Patient to remind them to get CT prior to appointment.  Left vm with Patient.  If patient calls back it is ok for the HUB to tell the pt the message.

## 2024-10-09 ENCOUNTER — PROCEDURE VISIT (OUTPATIENT)
Dept: PULMONOLOGY | Facility: CLINIC | Age: 74
End: 2024-10-09
Payer: OTHER MISCELLANEOUS

## 2024-10-09 ENCOUNTER — OFFICE VISIT (OUTPATIENT)
Dept: PULMONOLOGY | Facility: CLINIC | Age: 74
End: 2024-10-09
Payer: OTHER MISCELLANEOUS

## 2024-10-09 VITALS
HEART RATE: 72 BPM | WEIGHT: 187 LBS | OXYGEN SATURATION: 98 % | DIASTOLIC BLOOD PRESSURE: 70 MMHG | HEIGHT: 67 IN | BODY MASS INDEX: 29.35 KG/M2 | SYSTOLIC BLOOD PRESSURE: 130 MMHG

## 2024-10-09 DIAGNOSIS — J43.2 CENTRILOBULAR EMPHYSEMA: Primary | ICD-10-CM

## 2024-10-09 DIAGNOSIS — R94.2 NONSPECIFIC ABNORMAL RESULTS OF PULMONARY SYSTEM FUNCTION STUDY: ICD-10-CM

## 2024-10-09 DIAGNOSIS — Z14.8 CARRIER OF ALPHA-1-ANTITRYPSIN DEFICIENCY: ICD-10-CM

## 2024-10-09 NOTE — PROGRESS NOTES
"Background:  Pt w dyspnea, abn dlco, emphysema.  AAT MS   Chief Complaint  Centrilobular emphysema    Subjective    History of Present Illness     Frederic Palacios is here for follow up with [facility].  History of Present Illness  Pt follows up for dyspnea, emphysema.  Had normal 6 minute walk test without desaturation.  He has been taking Spiriva.  He fell off the porch and still has unpleasant pain with coughing or sneezing.  No pneumonia no fever.  He feels he has not recovered     Tobacco Use: Low Risk  (10/9/2024)    Patient History     Smoking Tobacco Use: Never     Smokeless Tobacco Use: Never     Passive Exposure: Not on file      Current Outpatient Medications   Medication Instructions    albuterol sulfate  (90 Base) MCG/ACT inhaler 2 puffs, Inhalation, Every 4 Hours PRN    DULoxetine (CYMBALTA) 30 MG capsule 1 capsule, Oral, Daily    gabapentin (NEURONTIN) 800 MG tablet 1 tablet, Oral, 3 Times Daily, for 30 days.    losartan (COZAAR) 50 MG tablet 1 tablet, Oral, Daily    meloxicam (MOBIC) 15 MG tablet 1 tablet, Oral, Daily, prn    nortriptyline (PAMELOR) 10 MG capsule 1 capsule, Oral, Nightly PRN    rosuvastatin (CRESTOR) 5 MG tablet 1 tablet, Oral, Daily    tiotropium bromide monohydrate (Spiriva Respimat) 2.5 MCG/ACT aerosol solution inhaler 2 puffs, Inhalation, Daily - RT    triamterene-hydrochlorothiazide (MAXZIDE-25) 37.5-25 MG per tablet 1 tablet, Oral, Every Morning    valACYclovir (VALTREX) 1,000 mg, Oral, Daily      Objective     Vital Signs:   /70   Pulse 72   Ht 170.2 cm (67\")   Wt 84.8 kg (187 lb)   SpO2 98% Comment: RA  BMI 29.29 kg/m²   Physical Exam  Constitutional:       Appearance: Normal appearance. He is not ill-appearing.   Eyes:      Extraocular Movements: Extraocular movements intact.   Pulmonary:      Effort: Pulmonary effort is normal. No respiratory distress.      Breath sounds: No wheezing, rhonchi or rales. "   Neurological:      Mental Status: He is alert.      Result Review  Data Reviewed:    XR Chest 2 View    Result Date: 9/30/2024  Impression:   1. No acute cardiopulmonary abnormality. 2. Multiple healing right-sided rib fractures.  This report was signed and finalized on 9/30/2024 8:49 AM by Dr. Law Lovell MD.       PFT Values          3/6/2023    11:15   Pre Drug PFT Results   FVC 96   FEV1 98   FEF 25-75% 110   FEV1/FVC 77                Assessment and Plan    Diagnoses and all orders for this visit:    1. Centrilobular emphysema (Primary)    2. Carrier of alpha-1-antitrypsin deficiency    3. Nonspecific abnormal results of pulmonary system function study    Will give trial of trelegy for the next month for his current symptoms and then change back to Spiriva  Follow up with spirometry next.    Follow Up   Return in about 6 months (around 4/9/2025) for spirometry.  Patient was given instructions and counseling regarding his condition or for health maintenance advice. Please see specific information pulled into the AVS if appropriate.    Electronically signed by Ishaan Fraire MD, 10/9/2024, 11:08 CDT

## 2024-10-11 ENCOUNTER — OFFICE VISIT (OUTPATIENT)
Dept: UROLOGY | Facility: CLINIC | Age: 74
End: 2024-10-11
Payer: MEDICARE

## 2024-10-11 VITALS — WEIGHT: 187 LBS | BODY MASS INDEX: 29.35 KG/M2 | HEIGHT: 67 IN | TEMPERATURE: 97.6 F

## 2024-10-11 DIAGNOSIS — N28.9 RENAL LESION: Primary | ICD-10-CM

## 2024-12-20 ENCOUNTER — TELEPHONE (OUTPATIENT)
Dept: PULMONOLOGY | Facility: CLINIC | Age: 74
End: 2024-12-20
Payer: MEDICARE

## 2024-12-20 DIAGNOSIS — J43.2 CENTRILOBULAR EMPHYSEMA: Primary | ICD-10-CM

## 2024-12-20 NOTE — TELEPHONE ENCOUNTER
Received a fax from [employer] stating that Spiriva is no longer covered by the patient's insurance.  The listed alternative is Incruse Ellipta.

## 2024-12-23 NOTE — TELEPHONE ENCOUNTER
Left a voicemail for patient to call back.    Per Dr. Fraire: Let him know his insurance is jerking us around and making us change the med.  Fortunately the one they want us to change him to works well in general.  Will send in rx.  Let us know if he has any trouble with it.

## 2025-03-13 DIAGNOSIS — J43.2 CENTRILOBULAR EMPHYSEMA: ICD-10-CM

## 2025-03-13 RX ORDER — ALBUTEROL SULFATE 90 UG/1
2 INHALANT RESPIRATORY (INHALATION) EVERY 4 HOURS PRN
Qty: 18 G | Refills: 11 | Status: SHIPPED | OUTPATIENT
Start: 2025-03-13

## 2025-03-13 NOTE — TELEPHONE ENCOUNTER
Patient came into the office stating he needed refills on incruse and albuterol hfa sent to [facility] [address]    Per protocol no cosign required, script sent to pharmacy.    Rx Refill Note  Requested Prescriptions     Pending Prescriptions Disp Refills    albuterol sulfate  (90 Base) MCG/ACT inhaler 18 g 11     Sig: Inhale 2 puffs Every 4 (Four) Hours As Needed for Wheezing. Indications: Chronic Obstructive Lung Disease    Umeclidinium Bromide (INCRUSE ELLIPTA) 62.5 MCG/ACT aerosol powder  30 each 11     Sig: Inhale 1 puff Daily.      Last office visit with prescribing clinician: 10/9/2024   Last telemedicine visit with prescribing clinician: Visit date not found   Next office visit with prescribing clinician: 4/9/2025                         Would you like a call back once the refill request has been completed: [] Yes [] No    If the office needs to give you a call back, can they leave a voicemail: [] Yes [] No    Brooke Evans MA  03/13/25, 11:48 CDT

## 2025-04-09 ENCOUNTER — OFFICE VISIT (OUTPATIENT)
Dept: PULMONOLOGY | Facility: CLINIC | Age: 75
End: 2025-04-09
Payer: OTHER MISCELLANEOUS

## 2025-04-09 VITALS
HEIGHT: 67 IN | HEART RATE: 67 BPM | SYSTOLIC BLOOD PRESSURE: 130 MMHG | DIASTOLIC BLOOD PRESSURE: 74 MMHG | BODY MASS INDEX: 31.23 KG/M2 | OXYGEN SATURATION: 97 % | WEIGHT: 199 LBS

## 2025-04-09 DIAGNOSIS — Z14.8 CARRIER OF ALPHA-1-ANTITRYPSIN DEFICIENCY: ICD-10-CM

## 2025-04-09 DIAGNOSIS — R94.2 NONSPECIFIC ABNORMAL RESULTS OF PULMONARY SYSTEM FUNCTION STUDY: ICD-10-CM

## 2025-04-09 DIAGNOSIS — J43.2 CENTRILOBULAR EMPHYSEMA: Primary | ICD-10-CM

## 2025-04-09 NOTE — PROCEDURES
Spirometry    Performed by: Yessy Domínguez, RRT  Authorized by: Ishaan Fraire MD     Pre Drug % Predicted    FVC: 90%   FEV1: 92%   FEF 25-75%: 98%   FEV1/FVC: 77%    Interpretation   Spirometry   Spirometry shows normal results. midflow is normal.  Overall comments: Stable when compared with 2023 study  Electronically signed by Ishaan Fraire MD, 4/9/2025, 12:16 CDT

## 2025-04-09 NOTE — PROGRESS NOTES
"Background:  [place]  w dyspnea, abn dlco, emphysema.  AAT MS   Chief Complaint  Emphysema    Subjective    History of Present Illness     Frederic Palacios is here for follow up with [facility]IC[facility].  History of Present Illness  He has used Spiriva in the past, but it went on a shortage.  Has been on incruse after that and thinks that is the 1 he has now.  Spiriva is not on formulary now, so he stays on incruse.  he has albuterol.  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  He has fallen in the setting of the neuropathy.       Tobacco Use: Low Risk  (4/9/2025)    Patient History     Smoking Tobacco Use: Never     Smokeless Tobacco Use: Never     Passive Exposure: Not on file      Current Outpatient Medications   Medication Instructions    albuterol sulfate  (90 Base) MCG/ACT inhaler 2 puffs, Inhalation, Every 4 Hours PRN    DULoxetine (CYMBALTA) 30 MG capsule 1 capsule, Daily    gabapentin (NEURONTIN) 800 MG tablet 1 tablet, 3 Times Daily    losartan (COZAAR) 50 MG tablet 1 tablet, Daily    meloxicam (MOBIC) 15 MG tablet 1 tablet, Daily    nortriptyline (PAMELOR) 10 MG capsule 1 capsule, Nightly PRN    rosuvastatin (CRESTOR) 5 MG tablet 1 tablet, Daily    triamterene-hydrochlorothiazide (MAXZIDE-25) 37.5-25 MG per tablet 1 tablet, Every Morning    Umeclidinium Bromide (INCRUSE ELLIPTA) 62.5 MCG/ACT aerosol powder  1 puff, Inhalation, Daily    valACYclovir (VALTREX) 1,000 mg, Daily      Objective     Vital Signs:   /74   Pulse 67   Ht 170.2 cm (67\")   Wt 90.3 kg (199 lb)   SpO2 97% Comment: RA  BMI 31.17 kg/m²   Physical Exam  Constitutional:       General: He is not in acute distress.     Appearance: He is not ill-appearing.   HENT:      Nose: Nose normal.   Pulmonary:      Effort: No respiratory distress.      Breath sounds: No wheezing.   Neurological:      Mental Status: He is alert.        Result " Review  Data Reviewed:         PFT Values          4/9/2025    11:15   Pre Drug PFT Results   FVC 90   FEV1 92   FEF 25-75% 98   FEV1/FVC 77                Assessment and Plan    Diagnoses and all orders for this visit:    1. Centrilobular emphysema (Primary)  -     Spirometry    2. Carrier of alpha-1-antitrypsin deficiency    3. Nonspecific abnormal results of pulmonary system function study    Give trial of trelegy to see if that helps better than the Incruse.  If so then he might be a candidate for anoro.  Since he is not exacerbation prone I do not think he would need to stay on triple therapy including the steroid for the long-term.  Continue albuterol up to 4 times a day as needed and prior to exertional activities.  He will report how the trelegy does and confirm what the one his taking which we think is incruse  No need for alpha-1 replacement therapy at this time.  We discussed favorable spirometry with normal indices      Follow Up   Return in about 4 months (around 8/9/2025).  Patient was given instructions and counseling regarding his condition or for health maintenance advice. Please see specific information pulled into the AVS if appropriate.    Electronically signed by Ishaan Fraire MD, 4/9/2025, 17:48 CDT

## 2025-04-28 ENCOUNTER — TELEPHONE (OUTPATIENT)
Dept: PULMONOLOGY | Facility: CLINIC | Age: 75
End: 2025-04-28
Payer: MEDICARE

## 2025-04-28 DIAGNOSIS — J43.2 CENTRILOBULAR EMPHYSEMA: Primary | ICD-10-CM

## 2025-04-28 NOTE — TELEPHONE ENCOUNTER
Rx Refill Note  Requested Prescriptions     Pending Prescriptions Disp Refills    Fluticasone-Umeclidin-Vilant (TRELEGY) 100-62.5-25 MCG/ACT inhaler 60 each 11     Sig: Inhale 1 puff Daily for 30 days.      Last office visit with prescribing clinician: 4/9/2025   Last telemedicine visit with prescribing clinician: Visit date not found   Next office visit with prescribing clinician: 8/19/2025                         Would you like a call back once the refill request has been completed: [] Yes [] No    If the office needs to give you a call back, can they leave a voicemail: [] Yes [] No    Yessy Robles CMA  04/28/25, 16:16 CDT

## 2025-04-28 NOTE — TELEPHONE ENCOUNTER
"Mariela send me a secure chat per the patient \" PATIENT NEEDS A REFILL FOR TRELEGY AND HE ALSO NEEDS A PFT PRE & POST ORDERED FOR THE DEPT OF LABOR AT THE HOSPITAL \"          "

## 2025-04-28 NOTE — TELEPHONE ENCOUNTER
Patient walked in up front and I asked where the script for the trelegy needed to go.  [facility] is where it needs to go.      He is needing the PFT order and would like to have it done ASAP or by may 8th.   Can you put a note on the order with the date of when he would like to have it done by for the DOL.

## 2025-05-02 ENCOUNTER — TRANSCRIBE ORDERS (OUTPATIENT)
Dept: ADMINISTRATIVE | Facility: HOSPITAL | Age: 75
End: 2025-05-02
Payer: MEDICARE

## 2025-05-02 DIAGNOSIS — J44.9 CHRONIC OBSTRUCTIVE PULMONARY DISEASE, UNSPECIFIED COPD TYPE: Primary | ICD-10-CM

## 2025-05-14 ENCOUNTER — HOSPITAL ENCOUNTER (OUTPATIENT)
Dept: PULMONOLOGY | Facility: HOSPITAL | Age: 75
Discharge: HOME | End: 2025-05-14
Admitting: PHYSICAL MEDICINE & REHABILITATION
Payer: OTHER MISCELLANEOUS

## 2025-05-14 DIAGNOSIS — J44.9 CHRONIC OBSTRUCTIVE PULMONARY DISEASE, UNSPECIFIED COPD TYPE: ICD-10-CM

## 2025-05-14 PROCEDURE — 94060 EVALUATION OF WHEEZING: CPT

## 2025-05-14 PROCEDURE — 94729 DIFFUSING CAPACITY: CPT

## 2025-05-14 PROCEDURE — 94726 PLETHYSMOGRAPHY LUNG VOLUMES: CPT

## 2025-05-14 RX ORDER — ALBUTEROL SULFATE 0.83 MG/ML
2.5 SOLUTION RESPIRATORY (INHALATION) ONCE
Status: COMPLETED | OUTPATIENT
Start: 2025-05-14 | End: 2025-05-14

## 2025-05-14 RX ADMIN — ALBUTEROL SULFATE 2.5 MG: 2.5 SOLUTION RESPIRATORY (INHALATION) at 10:36

## 2025-06-18 ENCOUNTER — APPOINTMENT (OUTPATIENT)
Dept: ULTRASOUND IMAGING | Facility: HOSPITAL | Age: 75
DRG: 603 | End: 2025-06-18
Payer: MEDICARE

## 2025-06-18 ENCOUNTER — HOSPITAL ENCOUNTER (INPATIENT)
Facility: HOSPITAL | Age: 75
LOS: 1 days | Discharge: HOME | DRG: 603 | End: 2025-06-20
Attending: FAMILY MEDICINE | Admitting: FAMILY MEDICINE
Payer: MEDICARE

## 2025-06-18 PROBLEM — L03.90 ACUTE CELLULITIS: Status: ACTIVE | Noted: 2025-06-18

## 2025-06-18 LAB
ALBUMIN SERPL-MCNC: 3.8 G/DL (ref 3.5–5.2)
ALBUMIN/GLOB SERPL: 1.2 G/DL
ALP SERPL-CCNC: 82 U/L (ref 39–117)
ALT SERPL W P-5'-P-CCNC: 8 U/L (ref 1–41)
ANION GAP SERPL CALCULATED.3IONS-SCNC: 13 MMOL/L (ref 5–15)
AST SERPL-CCNC: 14 U/L (ref 1–40)
BASOPHILS # BLD AUTO: 0.07 10*3/MM3 (ref 0–0.2)
BASOPHILS NFR BLD AUTO: 0.9 % (ref 0–1.5)
BILIRUB SERPL-MCNC: 0.2 MG/DL (ref 0–1.2)
BUN SERPL-MCNC: 26.6 MG/DL (ref 8–23)
BUN/CREAT SERPL: 16.1 (ref 7–25)
CALCIUM SPEC-SCNC: 9.1 MG/DL (ref 8.6–10.5)
CHLORIDE SERPL-SCNC: 104 MMOL/L (ref 98–107)
CO2 SERPL-SCNC: 24 MMOL/L (ref 22–29)
CREAT SERPL-MCNC: 1.65 MG/DL (ref 0.76–1.27)
DEPRECATED RDW RBC AUTO: 41.7 FL (ref 37–54)
EGFRCR SERPLBLD CKD-EPI 2021: 43 ML/MIN/1.73
EOSINOPHIL # BLD AUTO: 0.23 10*3/MM3 (ref 0–0.4)
EOSINOPHIL NFR BLD AUTO: 2.9 % (ref 0.3–6.2)
ERYTHROCYTE [DISTWIDTH] IN BLOOD BY AUTOMATED COUNT: 13.2 % (ref 12.3–15.4)
GLOBULIN UR ELPH-MCNC: 3.2 GM/DL
GLUCOSE SERPL-MCNC: 92 MG/DL (ref 65–99)
HCT VFR BLD AUTO: 35.8 % (ref 37.5–51)
HGB BLD-MCNC: 11.6 G/DL (ref 13–17.7)
IMM GRANULOCYTES # BLD AUTO: 0.1 10*3/MM3 (ref 0–0.05)
IMM GRANULOCYTES NFR BLD AUTO: 1.2 % (ref 0–0.5)
LYMPHOCYTES # BLD AUTO: 1.44 10*3/MM3 (ref 0.7–3.1)
LYMPHOCYTES NFR BLD AUTO: 17.9 % (ref 19.6–45.3)
MCH RBC QN AUTO: 27.8 PG (ref 26.6–33)
MCHC RBC AUTO-ENTMCNC: 32.4 G/DL (ref 31.5–35.7)
MCV RBC AUTO: 85.9 FL (ref 79–97)
MONOCYTES # BLD AUTO: 0.77 10*3/MM3 (ref 0.1–0.9)
MONOCYTES NFR BLD AUTO: 9.6 % (ref 5–12)
NEUTROPHILS NFR BLD AUTO: 5.43 10*3/MM3 (ref 1.7–7)
NEUTROPHILS NFR BLD AUTO: 67.5 % (ref 42.7–76)
NRBC BLD AUTO-RTO: 0 /100 WBC (ref 0–0.2)
PLATELET # BLD AUTO: 321 10*3/MM3 (ref 140–450)
PMV BLD AUTO: 10.4 FL (ref 6–12)
POTASSIUM SERPL-SCNC: 3.8 MMOL/L (ref 3.5–5.2)
PROCALCITONIN SERPL-MCNC: 0.1 NG/ML (ref 0–0.25)
PROT SERPL-MCNC: 7 G/DL (ref 6–8.5)
RBC # BLD AUTO: 4.17 10*6/MM3 (ref 4.14–5.8)
SODIUM SERPL-SCNC: 141 MMOL/L (ref 136–145)
WBC NRBC COR # BLD AUTO: 8.04 10*3/MM3 (ref 3.4–10.8)

## 2025-06-18 PROCEDURE — 80053 COMPREHEN METABOLIC PANEL: CPT

## 2025-06-18 PROCEDURE — 25010000002 HYALURONIDASE (HUMAN) 150 UNIT/ML SOLUTION 1 ML VIAL: Performed by: FAMILY MEDICINE

## 2025-06-18 PROCEDURE — 85025 COMPLETE CBC W/AUTO DIFF WBC: CPT

## 2025-06-18 PROCEDURE — 25010000002 VANCOMYCIN 1.25-0.9 GM/250ML-% SOLUTION: Performed by: FAMILY MEDICINE

## 2025-06-18 PROCEDURE — 25010000002 CEFEPIME PER 500 MG: Performed by: FAMILY MEDICINE

## 2025-06-18 PROCEDURE — 84145 PROCALCITONIN (PCT): CPT

## 2025-06-18 PROCEDURE — 25010000002 ENOXAPARIN PER 10 MG: Performed by: FAMILY MEDICINE

## 2025-06-18 PROCEDURE — 87040 BLOOD CULTURE FOR BACTERIA: CPT

## 2025-06-18 PROCEDURE — 93971 EXTREMITY STUDY: CPT

## 2025-06-18 PROCEDURE — 93971 EXTREMITY STUDY: CPT | Performed by: STUDENT IN AN ORGANIZED HEALTH CARE EDUCATION/TRAINING PROGRAM

## 2025-06-18 PROCEDURE — G0378 HOSPITAL OBSERVATION PER HR: HCPCS

## 2025-06-18 RX ORDER — DULOXETIN HYDROCHLORIDE 30 MG/1
30 CAPSULE, DELAYED RELEASE ORAL DAILY
Status: DISCONTINUED | OUTPATIENT
Start: 2025-06-19 | End: 2025-06-20 | Stop reason: HOSPADM

## 2025-06-18 RX ORDER — ONDANSETRON 2 MG/ML
4 INJECTION INTRAMUSCULAR; INTRAVENOUS EVERY 6 HOURS PRN
Status: DISCONTINUED | OUTPATIENT
Start: 2025-06-18 | End: 2025-06-20 | Stop reason: HOSPADM

## 2025-06-18 RX ORDER — VANCOMYCIN HYDROCHLORIDE
1250 EVERY 24 HOURS
Status: DISCONTINUED | OUTPATIENT
Start: 2025-06-18 | End: 2025-06-19

## 2025-06-18 RX ORDER — ENOXAPARIN SODIUM 100 MG/ML
30 INJECTION SUBCUTANEOUS
Status: DISCONTINUED | OUTPATIENT
Start: 2025-06-18 | End: 2025-06-20 | Stop reason: HOSPADM

## 2025-06-18 RX ORDER — GABAPENTIN 400 MG/1
800 CAPSULE ORAL EVERY 8 HOURS SCHEDULED
Status: DISCONTINUED | OUTPATIENT
Start: 2025-06-18 | End: 2025-06-20 | Stop reason: HOSPADM

## 2025-06-18 RX ORDER — POLYETHYLENE GLYCOL 3350 17 G/17G
17 POWDER, FOR SOLUTION ORAL DAILY PRN
Status: DISCONTINUED | OUTPATIENT
Start: 2025-06-18 | End: 2025-06-20 | Stop reason: HOSPADM

## 2025-06-18 RX ORDER — SODIUM CHLORIDE 0.9 % (FLUSH) 0.9 %
10 SYRINGE (ML) INJECTION AS NEEDED
Status: DISCONTINUED | OUTPATIENT
Start: 2025-06-18 | End: 2025-06-20 | Stop reason: HOSPADM

## 2025-06-18 RX ORDER — BISACODYL 5 MG/1
5 TABLET, DELAYED RELEASE ORAL DAILY PRN
Status: DISCONTINUED | OUTPATIENT
Start: 2025-06-18 | End: 2025-06-20 | Stop reason: HOSPADM

## 2025-06-18 RX ORDER — BISACODYL 10 MG
10 SUPPOSITORY, RECTAL RECTAL DAILY PRN
Status: DISCONTINUED | OUTPATIENT
Start: 2025-06-18 | End: 2025-06-20 | Stop reason: HOSPADM

## 2025-06-18 RX ORDER — SODIUM CHLORIDE 0.9 % (FLUSH) 0.9 %
10 SYRINGE (ML) INJECTION EVERY 12 HOURS SCHEDULED
Status: DISCONTINUED | OUTPATIENT
Start: 2025-06-18 | End: 2025-06-20 | Stop reason: HOSPADM

## 2025-06-18 RX ORDER — ACETAMINOPHEN 160 MG/5ML
650 SOLUTION ORAL EVERY 4 HOURS PRN
Status: DISCONTINUED | OUTPATIENT
Start: 2025-06-18 | End: 2025-06-20 | Stop reason: HOSPADM

## 2025-06-18 RX ORDER — NORTRIPTYLINE HYDROCHLORIDE 10 MG/1
10 CAPSULE ORAL NIGHTLY PRN
Status: DISCONTINUED | OUTPATIENT
Start: 2025-06-18 | End: 2025-06-20 | Stop reason: HOSPADM

## 2025-06-18 RX ORDER — ACETAMINOPHEN 650 MG/1
650 SUPPOSITORY RECTAL EVERY 4 HOURS PRN
Status: DISCONTINUED | OUTPATIENT
Start: 2025-06-18 | End: 2025-06-20 | Stop reason: HOSPADM

## 2025-06-18 RX ORDER — LOSARTAN POTASSIUM 50 MG/1
50 TABLET ORAL DAILY
Status: DISCONTINUED | OUTPATIENT
Start: 2025-06-19 | End: 2025-06-20 | Stop reason: HOSPADM

## 2025-06-18 RX ORDER — AMOXICILLIN 250 MG
2 CAPSULE ORAL 2 TIMES DAILY PRN
Status: DISCONTINUED | OUTPATIENT
Start: 2025-06-18 | End: 2025-06-20 | Stop reason: HOSPADM

## 2025-06-18 RX ORDER — ROSUVASTATIN CALCIUM 10 MG/1
5 TABLET, COATED ORAL DAILY
Status: DISCONTINUED | OUTPATIENT
Start: 2025-06-19 | End: 2025-06-20 | Stop reason: HOSPADM

## 2025-06-18 RX ORDER — TRIAMTERENE AND HYDROCHLOROTHIAZIDE 37.5; 25 MG/1; MG/1
1 TABLET ORAL
Status: DISCONTINUED | OUTPATIENT
Start: 2025-06-19 | End: 2025-06-20 | Stop reason: HOSPADM

## 2025-06-18 RX ORDER — ACETAMINOPHEN 325 MG/1
650 TABLET ORAL EVERY 4 HOURS PRN
Status: DISCONTINUED | OUTPATIENT
Start: 2025-06-18 | End: 2025-06-20 | Stop reason: HOSPADM

## 2025-06-18 RX ORDER — SODIUM CHLORIDE 9 MG/ML
40 INJECTION, SOLUTION INTRAVENOUS AS NEEDED
Status: DISCONTINUED | OUTPATIENT
Start: 2025-06-18 | End: 2025-06-20 | Stop reason: HOSPADM

## 2025-06-18 RX ADMIN — ENOXAPARIN SODIUM 30 MG: 100 INJECTION SUBCUTANEOUS at 19:40

## 2025-06-18 RX ADMIN — CEFEPIME 2000 MG: 2 INJECTION, POWDER, FOR SOLUTION INTRAVENOUS at 20:18

## 2025-06-18 RX ADMIN — HYALURONIDASE (HUMAN RECOMBINANT) 150 UNITS: 150 INJECTION, SOLUTION SUBCUTANEOUS at 22:16

## 2025-06-18 RX ADMIN — Medication 10 ML: at 20:18

## 2025-06-18 RX ADMIN — Medication 1250 MG: at 20:18

## 2025-06-18 RX ADMIN — GABAPENTIN 800 MG: 400 CAPSULE ORAL at 20:20

## 2025-06-18 NOTE — PROGRESS NOTES
[facility] Clinical Pharmacy Services: Vancomycin Monitoring  Frederic Palacios is a 75 y.o. male who is on Vancomycin Day 1 of 3 for Skin and Soft Tissue.    Previous Vancomycin Dose: 1250 mg IV every 24 hours  PK/PD Target: -600 mg/L.hr     Current Antimicrobial Therapy:  cefepime 2000 mg IVPB in 100 mL NS (MBP)  Pharmacy to dose vancomycin  valACYclovir - 1000 MG  vancomycin - 1.25-0.9 GM/250ML-%    Relevant clinical data and objective history reviewed:  Wt: 84.5 kg (186 lb 4.6 oz); BMI: Body mass index is 73.53 kg/m².    Temp Readings from Last 1 Encounters:   06/18/25 98.5 °F (36.9 °C) (Oral)        Estimated Creatinine Clearance: 27.2 mL/min (A) (by C-G formula based on SCr of 1.65 mg/dL (H)).    Results from last 7 days   Lab Units 06/18/25  1633   CREATININE mg/dL 1.65*   WBC 10*3/mm3 8.04   PROCALCITONIN ng/mL 0.10       Microbiology Culture Results:        Assessment/Plan:  Initiate Vancomycin 1250 mg IV every 24 hours, which provides the following predicted parameters:  Loading dose: N/A  Regimen: 1250 mg IV every 24 hours.  Start time: 19:30 on 06/18/2025  Exposure target: AUC24 (range) 400-600 mg/L.hr   AUC24,ss: 558 mg/L.hr  Probability of AUC24 > 400: 84 %  Ctrough,ss: 17.8 mg/L  Probability of Ctrough,ss > 20: 38 %  Probability of nephrotoxicity (Lodise AMILCAR 2009): 14 %  Pharmacy will continue to monitor renal function, clinical status and culture results and adjust the dose and/or frequency as needed.    Nikole Yang, PharmD  6/18/2025  18:45 CDT

## 2025-06-18 NOTE — PROGRESS NOTES
[facility] Clinical Pharmacy Services: Cefepime Consult  Frederic Palacios is a 75 y.o. male who has been consulted to dose Cefepime for Skin and Soft Tissue.    Current Antimicrobial Therapy:  cefepime 2000 mg IVPB in 100 mL NS (MBP)  Pharmacy to dose vancomycin  valACYclovir - 1000 MG    Relevant clinical data and objective history reviewed:  Wt: 84.5 kg (186 lb 4.6 oz); BMI: Body mass index is 73.53 kg/m².    Temp Readings from Last 1 Encounters:   06/18/25 98.5 °F (36.9 °C) (Oral)        Estimated Creatinine Clearance: 27.2 mL/min (A) (by C-G formula based on SCr of 1.65 mg/dL (H)).    Results from last 7 days   Lab Units 06/18/25  1633   CREATININE mg/dL 1.65*   WBC 10*3/mm3 8.04   PROCALCITONIN ng/mL 0.10       Microbiology Culture Results:        Assessment/Plan:  Initiate Cefepime 2000 mg IV extended infusion every 24 hours  Pharmacy will continue to monitor renal function, clinical status and culture results and adjust the dose and/or frequency as needed.    Nikole Yang, PharmD  6/18/2025  18:41 CDT

## 2025-06-18 NOTE — PROGRESS NOTES
"[facility] Clinical Pharmacy Services: Enoxaparin Consult  Frederic Palacios is a 75 y.o. male who has been consulted to Initiate prophylactic enoxaparin.     Indication: VTE Prophylaxis  Home Anticoagulation: N/A     Relevant clinical data and objective history reviewed:  Ht: 107.2 cm (42.21\"); Wt: 84.5 kg (186 lb 4.6 oz); BMI: Body mass index is 73.53 kg/m².  Estimated Creatinine Clearance: 27.2 mL/min (A) (by C-G formula based on SCr of 1.65 mg/dL (H)).  Results from last 7 days   Lab Units 06/18/25  1633   HEMOGLOBIN g/dL 11.6*   HEMATOCRIT % 35.8*   PLATELETS 10*3/mm3 321   CREATININE mg/dL 1.65*           Asessment/Plan:  Initiate 30mg subcutaneous every 24 hours.  Pharmacy will continue to monitor renal function and clinical status and adjust the dose and/or frequency as needed.    Nikole Yang, PharmD  6/18/2025  18:32 CDT          "

## 2025-06-19 PROBLEM — L03.90 CELLULITIS: Status: ACTIVE | Noted: 2025-06-19

## 2025-06-19 PROBLEM — G62.9 PERIPHERAL NEUROPATHY: Status: ACTIVE | Noted: 2025-06-19

## 2025-06-19 PROBLEM — I10 BENIGN HYPERTENSION: Status: ACTIVE | Noted: 2023-06-22

## 2025-06-19 LAB
ANION GAP SERPL CALCULATED.3IONS-SCNC: 14 MMOL/L (ref 5–15)
BASOPHILS # BLD AUTO: 0.06 10*3/MM3 (ref 0–0.2)
BASOPHILS NFR BLD AUTO: 0.9 % (ref 0–1.5)
BUN SERPL-MCNC: 23.9 MG/DL (ref 8–23)
BUN/CREAT SERPL: 15.6 (ref 7–25)
CALCIUM SPEC-SCNC: 8.4 MG/DL (ref 8.6–10.5)
CHLORIDE SERPL-SCNC: 107 MMOL/L (ref 98–107)
CO2 SERPL-SCNC: 22 MMOL/L (ref 22–29)
CREAT SERPL-MCNC: 1.53 MG/DL (ref 0.76–1.27)
DEPRECATED RDW RBC AUTO: 42.5 FL (ref 37–54)
EGFRCR SERPLBLD CKD-EPI 2021: 47.1 ML/MIN/1.73
EOSINOPHIL # BLD AUTO: 0.2 10*3/MM3 (ref 0–0.4)
EOSINOPHIL NFR BLD AUTO: 3 % (ref 0.3–6.2)
ERYTHROCYTE [DISTWIDTH] IN BLOOD BY AUTOMATED COUNT: 13.3 % (ref 12.3–15.4)
GLUCOSE SERPL-MCNC: 105 MG/DL (ref 65–99)
HCT VFR BLD AUTO: 33.1 % (ref 37.5–51)
HGB BLD-MCNC: 10.5 G/DL (ref 13–17.7)
IMM GRANULOCYTES # BLD AUTO: 0.07 10*3/MM3 (ref 0–0.05)
IMM GRANULOCYTES NFR BLD AUTO: 1 % (ref 0–0.5)
LYMPHOCYTES # BLD AUTO: 1.36 10*3/MM3 (ref 0.7–3.1)
LYMPHOCYTES NFR BLD AUTO: 20.1 % (ref 19.6–45.3)
MCH RBC QN AUTO: 27.3 PG (ref 26.6–33)
MCHC RBC AUTO-ENTMCNC: 31.7 G/DL (ref 31.5–35.7)
MCV RBC AUTO: 86 FL (ref 79–97)
MONOCYTES # BLD AUTO: 0.72 10*3/MM3 (ref 0.1–0.9)
MONOCYTES NFR BLD AUTO: 10.7 % (ref 5–12)
MRSA DNA SPEC QL NAA+PROBE: NORMAL
NEUTROPHILS NFR BLD AUTO: 4.35 10*3/MM3 (ref 1.7–7)
NEUTROPHILS NFR BLD AUTO: 64.3 % (ref 42.7–76)
NRBC BLD AUTO-RTO: 0 /100 WBC (ref 0–0.2)
PLATELET # BLD AUTO: 296 10*3/MM3 (ref 140–450)
PMV BLD AUTO: 10.1 FL (ref 6–12)
POTASSIUM SERPL-SCNC: 4 MMOL/L (ref 3.5–5.2)
RBC # BLD AUTO: 3.85 10*6/MM3 (ref 4.14–5.8)
SODIUM SERPL-SCNC: 143 MMOL/L (ref 136–145)
WBC NRBC COR # BLD AUTO: 6.76 10*3/MM3 (ref 3.4–10.8)

## 2025-06-19 PROCEDURE — 87641 MR-STAPH DNA AMP PROBE: CPT | Performed by: FAMILY MEDICINE

## 2025-06-19 PROCEDURE — 80048 BASIC METABOLIC PNL TOTAL CA: CPT

## 2025-06-19 PROCEDURE — 25010000002 ENOXAPARIN PER 10 MG: Performed by: FAMILY MEDICINE

## 2025-06-19 PROCEDURE — 85025 COMPLETE CBC W/AUTO DIFF WBC: CPT

## 2025-06-19 PROCEDURE — 25010000002 ONDANSETRON PER 1 MG

## 2025-06-19 PROCEDURE — 25010000002 CEFEPIME PER 500 MG: Performed by: FAMILY MEDICINE

## 2025-06-19 RX ORDER — UMECLIDINIUM 62.5 UG/1
1 AEROSOL, POWDER ORAL DAILY
COMMUNITY

## 2025-06-19 RX ORDER — PANTOPRAZOLE SODIUM 40 MG/1
40 TABLET, DELAYED RELEASE ORAL DAILY
COMMUNITY

## 2025-06-19 RX ORDER — DULOXETIN HYDROCHLORIDE 60 MG/1
60 CAPSULE, DELAYED RELEASE ORAL DAILY
COMMUNITY

## 2025-06-19 RX ADMIN — CEFEPIME 2000 MG: 2 INJECTION, POWDER, FOR SOLUTION INTRAVENOUS at 19:35

## 2025-06-19 RX ADMIN — Medication 10 ML: at 08:48

## 2025-06-19 RX ADMIN — Medication 10 ML: at 20:39

## 2025-06-19 RX ADMIN — GABAPENTIN 800 MG: 400 CAPSULE ORAL at 20:38

## 2025-06-19 RX ADMIN — DULOXETINE 30 MG: 30 CAPSULE, DELAYED RELEASE ORAL at 08:48

## 2025-06-19 RX ADMIN — TRIAMTERENE AND HYDROCHLOROTHIAZIDE 1 TABLET: 37.5; 25 TABLET ORAL at 05:57

## 2025-06-19 RX ADMIN — ENOXAPARIN SODIUM 30 MG: 100 INJECTION SUBCUTANEOUS at 17:46

## 2025-06-19 RX ADMIN — ROSUVASTATIN 5 MG: 10 TABLET, FILM COATED ORAL at 08:48

## 2025-06-19 RX ADMIN — GABAPENTIN 800 MG: 400 CAPSULE ORAL at 13:19

## 2025-06-19 RX ADMIN — GABAPENTIN 800 MG: 400 CAPSULE ORAL at 05:58

## 2025-06-19 RX ADMIN — ACETAMINOPHEN 650 MG: 325 TABLET ORAL at 17:46

## 2025-06-19 RX ADMIN — ONDANSETRON 4 MG: 2 INJECTION INTRAMUSCULAR; INTRAVENOUS at 17:46

## 2025-06-19 NOTE — PLAN OF CARE
Goal Outcome Evaluation:  Plan of Care Reviewed With: patient        Progress: improving  Outcome Evaluation: MRSA SWAB DONE = NEGATIVE. PRN PAIN MEDS FOR C/O HEADACHE. PT. UP AD LITA. ROOM AIR. LESS REDNESS NOTED TO RLE. ENCOURATED TO KEEP RLE ELEVATED. IV ANTIBIOTICS PER ORDER. NO DISTRESS NOTED.

## 2025-06-19 NOTE — PLAN OF CARE
Goal Outcome Evaluation:  Plan of Care Reviewed With: patient   Pt ambulated in room and has an unbalanced gait. . Cellulitis worse on right leg. Py had half of a 250 ml bag ov vanc infitrate in his right hand and lower ar. Treatment administered and arm elevated all night.   Progress: improving

## 2025-06-19 NOTE — CASE MANAGEMENT/SOCIAL WORK
Continued Stay Note  [facility]OBERT Rushing     Patient Name: Frederic Palacios  MRN: [id number]  Today's Date: 6/19/2025    Admit Date: 6/18/2025    Plan: Home   Discharge Plan       Row Name 06/19/25 1455       Plan    Plan Home    Patient/Family in Agreement with Plan yes    Plan Comments Rec'd a call from Roseann from [facility] [phone]. She sees pt for nursing once a month. She is requesting d/c summary be faxed to her at [phone] when pt is d/c'ed.                   Discharge Codes    No documentation.                       KAREN Kothari

## 2025-06-19 NOTE — CASE MANAGEMENT/SOCIAL WORK
Discharge Planning Assessment  [facility]     Patient Name: Frederic Palacios  MRN: [id number]  Today's Date: 6/19/2025    Admit Date: 6/18/2025        Discharge Needs Assessment       Row Name 06/19/25 1211       Living Environment    People in Home alone    Current Living Arrangements home    Potentially Unsafe Housing Conditions none    In the past 12 months has the electric, gas, oil, or water company threatened to shut off services in your home? No    Primary Care Provided by self    Provides Primary Care For no one    Family Caregiver if Needed other (see comments)    Quality of Family Relationships helpful;involved;supportive    Able to Return to Prior Arrangements yes       Resource/Environmental Concerns    Resource/Environmental Concerns none    Transportation Concerns none       Transportation Needs    In the past 12 months, has lack of transportation kept you from medical appointments or from getting medications? no    In the past 12 months, has lack of transportation kept you from meetings, work, or from getting things needed for daily living? No       Food Insecurity    Within the past 12 months, you worried that your food would run out before you got the money to buy more. Never true    Within the past 12 months, the food you bought just didn't last and you didn't have money to get more. Never true       Transition Planning    Patient/Family Anticipates Transition to home    Patient/Family Anticipated Services at Transition none    Transportation Anticipated family or friend will provide       Discharge Needs Assessment    Equipment Currently Used at Home cane, straight    Concerns to be Addressed discharge planning;denies needs/concerns at this time    Do you want help finding or keeping work or a job? I do not need or want help    Do you want help with school or training? For example, starting or completing job training or getting a high school diploma, GED or equivalent No    Anticipated Changes  Related to Illness none    Equipment Needed After Discharge none    Discharge Coordination/Progress Lives alone but has friends that can assist if needed. Has a straight cane for ambulation. PCP is Dr. Tong. Denies any needs today. We will follow and assist if needed.                   Discharge Plan    No documentation.                      Demographic Summary    No documentation.                  Functional Status    No documentation.                  Psychosocial    No documentation.                  Abuse/Neglect    No documentation.                  Legal    No documentation.                  Substance Abuse    No documentation.                  Patient Forms    No documentation.                     Mary Carmen Banks RN

## 2025-06-19 NOTE — PLAN OF CARE
Goal Outcome Evaluation:  Plan of Care Reviewed With: patient        Progress: no change  Outcome Evaluation: iid patent. denies pain. right calt with redness and moderate swelling, left calf with mild swelling and only small area of redness. up ad gabino. attempted to get pt in hosp gown mult times and he insists on staying in his clothes and that he always wears them day and night. unable to complete skin assessment to buttocks at this time. iv abx ordered. cont to monitor.

## 2025-06-19 NOTE — H&P
History and Physical    Patient:  Frederic Palacios  MRN: [id number]    CHIEF COMPLAINT: Right leg swelling and redness    History Obtained From: the patient   PCP: Evert Tong MD    HISTORY OF PRESENT ILLNESS:   The patient is a 75 y.o. male who presents with ongoing cellulitis who came into the office for lack of improvement.  Patient been treated with a round of Keflex followed by a round of clindamycin for right lower extremity cellulitis and presented with worsening right lower extremity swelling and erythema despite antibiotic treatments.  Also had a small area of erythema on his left lower extremity.  Noted some chills but did not have a true fever.  Given failure of outpatient antibiotics he was directed mated for further treat with IV antibiotics.  This morning is resting comfortably in the chair and notes that his erythema and warmth of tremendously improved of his right lower extremity.  Continues to have swelling.  Did have issues with IV infiltration in his right hand overnight.  No fevers.    REVIEW OF SYSTEMS:    Review of Systems   Constitutional:  Negative for chills and fever.   HENT:  Negative for congestion.    Respiratory:  Negative for cough and shortness of breath.    Gastrointestinal:  Negative for diarrhea, nausea and vomiting.   Genitourinary:  Negative for dysuria and urgency.   Musculoskeletal:  Negative for arthralgias.   Skin:  Positive for color change and rash.   Neurological:  Positive for numbness (chronic neuropathy). Negative for weakness.   Psychiatric/Behavioral:  Negative for agitation and confusion.           Past Medical History:  Past Medical History:   Diagnosis Date    History of radiation therapy 01/03/2017    7560 cGy to prostate    Hypertension     Prostate cancer        Past Surgical History:  Past Surgical History:   Procedure Laterality Date    COLONOSCOPY  07/08/2010    normal    COLONOSCOPY  01/29/2004    small polyp at 25 cm,otherwise normal  colonoscopy    COLONOSCOPY N/A 2/16/2018    Procedure: COLONOSCOPY WITH ANESTHESIA;  Surgeon: Abhijeet Alonso DO;  Location: [facility] ENDOSCOPY;  Service:     ENDOSCOPY  01/29/2004    gastritis    PROSTATE BIOPSY         Medications Prior to Admission:    Medications Prior to Admission   Medication Sig Dispense Refill Last Dose/Taking    DULoxetine (CYMBALTA) 30 MG capsule Take 1 capsule by mouth Daily. Indications: Diabetes with Nerve Disease   Patient Taking Differently    albuterol sulfate  (90 Base) MCG/ACT inhaler Inhale 2 puffs Every 4 (Four) Hours As Needed for Wheezing. Indications: Chronic Obstructive Lung Disease 18 g 11     gabapentin (NEURONTIN) 800 MG tablet Take 1 tablet by mouth 3 (Three) Times a Day. for 30 days.  Indications: Diabetes with Nerve Disease       losartan (COZAAR) 50 MG tablet Take 1 tablet by mouth Daily. Indications: High Blood Pressure       meloxicam (MOBIC) 15 MG tablet Take 1 tablet by mouth Daily. prn  Indications: Joint Damage causing Pain and Loss of Function       nortriptyline (PAMELOR) 10 MG capsule Take 1 capsule by mouth At Night As Needed for Sleep. Indications: Trouble Sleeping       rosuvastatin (CRESTOR) 5 MG tablet Take 1 tablet by mouth Daily. Indications: High Amount of Fats in the Blood       triamterene-hydrochlorothiazide (MAXZIDE-25) 37.5-25 MG per tablet Take 1 tablet by mouth Every Morning. Indications: High Blood Pressure       valACYclovir (Valtrex) 1000 MG tablet Take 1 tablet by mouth Daily.          Allergies:  Lyrica [pregabalin]    Social History:   Social History     Socioeconomic History    Marital status: Single   Tobacco Use    Smoking status: Never     Passive exposure: Never    Smokeless tobacco: Never   Vaping Use    Vaping status: Never Used   Substance and Sexual Activity    Alcohol use: No    Drug use: No    Sexual activity: Not Currently       Family History:   Family History   Problem Relation Age of Onset    COPD Father     Colon  "cancer Father     Colon cancer Maternal Grandmother            Physical Exam:    Vitals: BP 97/56 (BP Location: Left arm, Patient Position: Lying)   Pulse 67   Temp 98.2 °F (36.8 °C) (Oral)   Resp 16   Ht 107.2 cm (42.21\")   Wt 84.5 kg (186 lb 4.6 oz)   SpO2 95%   BMI 73.53 kg/m²   Physical Exam  Constitutional:       Appearance: He is well-developed.   HENT:      Head: Normocephalic and atraumatic.   Eyes:      Conjunctiva/sclera: Conjunctivae normal.      Pupils: Pupils are equal, round, and reactive to light.   Cardiovascular:      Rate and Rhythm: Normal rate and regular rhythm.      Heart sounds: Normal heart sounds. No murmur heard.     No friction rub.   Pulmonary:      Effort: Pulmonary effort is normal. No respiratory distress.      Breath sounds: Normal breath sounds. No wheezing or rales.   Abdominal:      General: Bowel sounds are normal. There is no distension.      Palpations: Abdomen is soft.      Tenderness: There is no abdominal tenderness.   Musculoskeletal:      Cervical back: Normal range of motion.      Comments: 3+ right lower extremity edema.  Significantly improved erythema   Skin:     Capillary Refill: Capillary refill takes less than 2 seconds.   Neurological:      Mental Status: He is alert and oriented to person, place, and time.      Cranial Nerves: No cranial nerve deficit.   Psychiatric:         Behavior: Behavior normal.           Lab Results (last 24 hours)       Procedure Component Value Units Date/Time    Basic Metabolic Panel [[id number]]  (Abnormal) Collected: 06/19/25 0322    Specimen: Blood Updated: 06/19/25 0416     Glucose 105 mg/dL      BUN 23.9 mg/dL      Creatinine 1.53 mg/dL      Sodium 143 mmol/L      Potassium 4.0 mmol/L      Chloride 107 mmol/L      CO2 22.0 mmol/L      Calcium 8.4 mg/dL      BUN/Creatinine Ratio 15.6     Anion Gap 14.0 mmol/L      eGFR 47.1 mL/min/1.73     Narrative:      GFR Categories in Chronic Kidney Disease (CKD)              GFR Category   "        GFR (mL/min/1.73)    Interpretation  G1                    90 or greater        Normal or high (1)  G2                    60-89                Mild decrease (1)  G3a                   45-59                Mild to moderate decrease  G3b                   30-44                Moderate to severe decrease  G4                    15-29                Severe decrease  G5                    14 or less           Kidney failure    (1)In the absence of evidence of kidney disease, neither GFR category G1 or G2 fulfill the criteria for CKD.    eGFR calculation 2021 CKD-EPI creatinine equation, which does not include race as a factor    CBC & Differential [[id number]]  (Abnormal) Collected: 06/19/25 0322    Specimen: Blood Updated: 06/19/25 0355    Narrative:      The following orders were created for panel order CBC & Differential.  Procedure                               Abnormality         Status                     ---------                               -----------         ------                     CBC Auto Differential[[id number]]        Abnormal            Final result                 Please view results for these tests on the individual orders.    CBC Auto Differential [[id number]]  (Abnormal) Collected: 06/19/25 0322    Specimen: Blood Updated: 06/19/25 0355     WBC 6.76 10*3/mm3      RBC 3.85 10*6/mm3      Hemoglobin 10.5 g/dL      Hematocrit 33.1 %      MCV 86.0 fL      MCH 27.3 pg      MCHC 31.7 g/dL      RDW 13.3 %      RDW-SD 42.5 fl      MPV 10.1 fL      Platelets 296 10*3/mm3      Neutrophil % 64.3 %      Lymphocyte % 20.1 %      Monocyte % 10.7 %      Eosinophil % 3.0 %      Basophil % 0.9 %      Immature Grans % 1.0 %      Neutrophils, Absolute 4.35 10*3/mm3      Lymphocytes, Absolute 1.36 10*3/mm3      Monocytes, Absolute 0.72 10*3/mm3      Eosinophils, Absolute 0.20 10*3/mm3      Basophils, Absolute 0.06 10*3/mm3      Immature Grans, Absolute 0.07 10*3/mm3      nRBC 0.0 /100 WBC     Procalcitonin  "[[id number]]  (Normal) Collected: 06/18/25 1633    Specimen: Blood Updated: 06/18/25 1711     Procalcitonin 0.10 ng/mL     Narrative:      As a Marker for Sepsis (Non-Neonates):    1. <0.5 ng/mL represents a low risk of severe sepsis and/or septic shock.  2. >2 ng/mL represents a high risk of severe sepsis and/or septic shock.    As a Marker for Lower Respiratory Tract Infections that require antibiotic therapy:    PCT on Admission    Antibiotic Therapy       6-12 Hrs later    >0.5                Strongly Recommended  >0.25 - <0.5        Recommended   0.1 - 0.25          Discouraged              Remeasure/reassess PCT  <0.1                Strongly Discouraged     Remeasure/reassess PCT    As 28 day mortality risk marker: \"Change in Procalcitonin Result\" (>80% or <=80%) if Day 0 (or Day 1) and Day 4 values are available. Refer to http://www.[employer]-pct-calculator.com    Change in PCT <=80%  A decrease of PCT levels below or equal to 80% defines a positive change in PCT test result representing a higher risk for 28-day all-cause mortality of patients diagnosed with severe sepsis for septic shock.    Change in PCT >80%  A decrease of PCT levels of more than 80% defines a negative change in PCT result representing a lower risk for 28-day all-cause mortality of patients diagnosed with severe sepsis or septic shock.       Comprehensive Metabolic Panel [[id number]]  (Abnormal) Collected: 06/18/25 1633    Specimen: Blood Updated: 06/18/25 1706     Glucose 92 mg/dL      BUN 26.6 mg/dL      Creatinine 1.65 mg/dL      Sodium 141 mmol/L      Potassium 3.8 mmol/L      Chloride 104 mmol/L      CO2 24.0 mmol/L      Calcium 9.1 mg/dL      Total Protein 7.0 g/dL      Albumin 3.8 g/dL      ALT (SGPT) 8 U/L      AST (SGOT) 14 U/L      Alkaline Phosphatase 82 U/L      Total Bilirubin 0.2 mg/dL      Globulin 3.2 gm/dL      A/G Ratio 1.2 g/dL      BUN/Creatinine Ratio 16.1     Anion Gap 13.0 mmol/L      eGFR 43.0 mL/min/1.73     Narrative:  "     GFR Categories in Chronic Kidney Disease (CKD)              GFR Category          GFR (mL/min/1.73)    Interpretation  G1                    90 or greater        Normal or high (1)  G2                    60-89                Mild decrease (1)  G3a                   45-59                Mild to moderate decrease  G3b                   30-44                Moderate to severe decrease  G4                    15-29                Severe decrease  G5                    14 or less           Kidney failure    (1)In the absence of evidence of kidney disease, neither GFR category G1 or G2 fulfill the criteria for CKD.    eGFR calculation 2021 CKD-EPI creatinine equation, which does not include race as a factor    CBC & Differential [[id number]]  (Abnormal) Collected: 06/18/25 1633    Specimen: Blood Updated: 06/18/25 1646    Narrative:      The following orders were created for panel order CBC & Differential.  Procedure                               Abnormality         Status                     ---------                               -----------         ------                     CBC Auto Differential[[id number]]        Abnormal            Final result                 Please view results for these tests on the individual orders.    CBC Auto Differential [[id number]]  (Abnormal) Collected: 06/18/25 1633    Specimen: Blood Updated: 06/18/25 1646     WBC 8.04 10*3/mm3      RBC 4.17 10*6/mm3      Hemoglobin 11.6 g/dL      Hematocrit 35.8 %      MCV 85.9 fL      MCH 27.8 pg      MCHC 32.4 g/dL      RDW 13.2 %      RDW-SD 41.7 fl      MPV 10.4 fL      Platelets 321 10*3/mm3      Neutrophil % 67.5 %      Lymphocyte % 17.9 %      Monocyte % 9.6 %      Eosinophil % 2.9 %      Basophil % 0.9 %      Immature Grans % 1.2 %      Neutrophils, Absolute 5.43 10*3/mm3      Lymphocytes, Absolute 1.44 10*3/mm3      Monocytes, Absolute 0.77 10*3/mm3      Eosinophils, Absolute 0.23 10*3/mm3      Basophils, Absolute 0.07 10*3/mm3      Immature  Grans, Absolute 0.10 10*3/mm3      nRBC 0.0 /100 WBC     Blood Culture - Blood, Arm, Left [[id number]] Collected: 06/18/25 1633    Specimen: Blood from Arm, Left Updated: 06/18/25 1645    Blood Culture - Blood, Arm, Right [[id number]] Collected: 06/18/25 1633    Specimen: Blood from Arm, Right Updated: 06/18/25 1644             -----------------------------------------------------------------    Radiology:     US Venous Doppler Lower Extremity Right (duplex)  Result Date: 6/18/2025  History: Swelling      Impression: There is no evidence of deep venous thrombosis or superficial thrombophlebitis of the right lower extremity.  Comments: Right lower extremity venous duplex exam was performed using color Doppler flow, Doppler wave form analysis, and grayscale imaging, with and without compression. There is no evidence of deep venous thrombosis of the common femoral, superficial femoral, popliteal, posterior tibial, and peroneal veins. There is no thrombus identified in the saphenofemoral junction or the greater saphenous vein.   This report was signed and finalized on 6/18/2025 5:44 PM by Arcenio Gomez.        Assessment and Plan   Acute cellulitis  Likely strep cellulitis based on appearance.  Currently on broad-spectrum antibiotics of vancomycin and cefepime.  Will get MRSA swab today and hopefully discontinue his Vanco if negative.  Normal white count.  DVT scan negative.  Continue IV antibiotics.    Stage IIIb chronic kidney disease  Creatinine at baseline, continue to monitor especially while on IV antibiotics.    Benign hypertension  Continue home medications    Peripheral neuropathy  Continue home gabapentin, he has fairly severe diffuse peripheral neuropathy.    Disposition: Inpatient    CODE STATUS: Full    DVT prophylaxis: Lovenox      Cellulitis    Benign hypertension    Stage 3b chronic kidney disease (CKD)    Acute cellulitis    Peripheral neuropathy      Evert Tong MD 6/19/2025 07:40 CDT

## 2025-06-20 ENCOUNTER — READMISSION MANAGEMENT (OUTPATIENT)
Dept: CALL CENTER | Facility: HOSPITAL | Age: 75
End: 2025-06-20
Payer: MEDICARE

## 2025-06-20 VITALS
TEMPERATURE: 98.2 F | RESPIRATION RATE: 16 BRPM | WEIGHT: 186.29 LBS | DIASTOLIC BLOOD PRESSURE: 60 MMHG | BODY MASS INDEX: 36.57 KG/M2 | HEART RATE: 65 BPM | OXYGEN SATURATION: 95 % | HEIGHT: 60 IN | SYSTOLIC BLOOD PRESSURE: 118 MMHG

## 2025-06-20 LAB
ANION GAP SERPL CALCULATED.3IONS-SCNC: 11 MMOL/L (ref 5–15)
BASOPHILS # BLD AUTO: 0.07 10*3/MM3 (ref 0–0.2)
BASOPHILS NFR BLD AUTO: 1.1 % (ref 0–1.5)
BUN SERPL-MCNC: 22.5 MG/DL (ref 8–23)
BUN/CREAT SERPL: 16.1 (ref 7–25)
CALCIUM SPEC-SCNC: 8.3 MG/DL (ref 8.6–10.5)
CHLORIDE SERPL-SCNC: 106 MMOL/L (ref 98–107)
CO2 SERPL-SCNC: 23 MMOL/L (ref 22–29)
CREAT SERPL-MCNC: 1.4 MG/DL (ref 0.76–1.27)
DEPRECATED RDW RBC AUTO: 41 FL (ref 37–54)
EGFRCR SERPLBLD CKD-EPI 2021: 52.4 ML/MIN/1.73
EOSINOPHIL # BLD AUTO: 0.26 10*3/MM3 (ref 0–0.4)
EOSINOPHIL NFR BLD AUTO: 4.2 % (ref 0.3–6.2)
ERYTHROCYTE [DISTWIDTH] IN BLOOD BY AUTOMATED COUNT: 13.2 % (ref 12.3–15.4)
GLUCOSE SERPL-MCNC: 103 MG/DL (ref 65–99)
HCT VFR BLD AUTO: 33.5 % (ref 37.5–51)
HGB BLD-MCNC: 10.7 G/DL (ref 13–17.7)
IMM GRANULOCYTES # BLD AUTO: 0.1 10*3/MM3 (ref 0–0.05)
IMM GRANULOCYTES NFR BLD AUTO: 1.6 % (ref 0–0.5)
LYMPHOCYTES # BLD AUTO: 1.49 10*3/MM3 (ref 0.7–3.1)
LYMPHOCYTES NFR BLD AUTO: 23.9 % (ref 19.6–45.3)
MCH RBC QN AUTO: 27.2 PG (ref 26.6–33)
MCHC RBC AUTO-ENTMCNC: 31.9 G/DL (ref 31.5–35.7)
MCV RBC AUTO: 85 FL (ref 79–97)
MONOCYTES # BLD AUTO: 0.75 10*3/MM3 (ref 0.1–0.9)
MONOCYTES NFR BLD AUTO: 12 % (ref 5–12)
NEUTROPHILS NFR BLD AUTO: 3.56 10*3/MM3 (ref 1.7–7)
NEUTROPHILS NFR BLD AUTO: 57.2 % (ref 42.7–76)
NRBC BLD AUTO-RTO: 0 /100 WBC (ref 0–0.2)
PLATELET # BLD AUTO: 282 10*3/MM3 (ref 140–450)
PMV BLD AUTO: 10 FL (ref 6–12)
POTASSIUM SERPL-SCNC: 3.8 MMOL/L (ref 3.5–5.2)
RBC # BLD AUTO: 3.94 10*6/MM3 (ref 4.14–5.8)
SODIUM SERPL-SCNC: 140 MMOL/L (ref 136–145)
WBC NRBC COR # BLD AUTO: 6.23 10*3/MM3 (ref 3.4–10.8)

## 2025-06-20 PROCEDURE — 85025 COMPLETE CBC W/AUTO DIFF WBC: CPT

## 2025-06-20 PROCEDURE — 80048 BASIC METABOLIC PNL TOTAL CA: CPT

## 2025-06-20 RX ORDER — CEFPODOXIME PROXETIL 200 MG/1
400 TABLET, FILM COATED ORAL EVERY 12 HOURS
Qty: 32 TABLET | Refills: 0 | Status: SHIPPED | OUTPATIENT
Start: 2025-06-20 | End: 2025-06-28

## 2025-06-20 RX ADMIN — TRIAMTERENE AND HYDROCHLOROTHIAZIDE 1 TABLET: 37.5; 25 TABLET ORAL at 05:12

## 2025-06-20 RX ADMIN — LOSARTAN POTASSIUM 50 MG: 50 TABLET, FILM COATED ORAL at 08:46

## 2025-06-20 RX ADMIN — GABAPENTIN 800 MG: 400 CAPSULE ORAL at 05:12

## 2025-06-20 RX ADMIN — ROSUVASTATIN 5 MG: 10 TABLET, FILM COATED ORAL at 08:46

## 2025-06-20 RX ADMIN — DULOXETINE 30 MG: 30 CAPSULE, DELAYED RELEASE ORAL at 08:46

## 2025-06-20 RX ADMIN — Medication 10 ML: at 08:44

## 2025-06-20 NOTE — CASE MANAGEMENT/SOCIAL WORK
Continued Stay Note   Kristan     Patient Name: Frederic Palacios  MRN: [id number]  Today's Date: 6/20/2025    Admit Date: 6/18/2025    Plan: Home   Discharge Plan       Row Name 06/20/25 1032       Plan    Plan Home    Patient/Family in Agreement with Plan yes    Final Discharge Disposition Code 01 - home or self-care    Final Note Pt is going home today. Notified Roseann with [employer] [phone]. Faxed the d/c summary to her at [phone].                   Discharge Codes    No documentation.                 Expected Discharge Date and Time       Expected Discharge Date Expected Discharge Time    Jun 20, 2025               KAREN Kothari

## 2025-06-20 NOTE — PLAN OF CARE
Goal Outcome Evaluation:  Plan of Care Reviewed With: patient        Progress: no change  Outcome Evaluation: Pt resting most of the night. No c/o pain. IV abx infused. Redness to RLE unchanged, keeping elevated on pillows. Up ad gabino. Voiding, BM this shift. Safety maintained.

## 2025-06-20 NOTE — DISCHARGE SUMMARY
Hospital Discharge Summary    Frederic Palacios  :  1950  MRN:  [id number]    Admit date:  2025  Discharge date: 2025    Admitting Physician:    Evert Tong MD    Discharge Diagnoses:      Cellulitis    Benign hypertension    Stage 3b chronic kidney disease (CKD)    Acute cellulitis    Peripheral neuropathy      Hospital Course:   75-year-old gentleman who presented with worsening cellulitis of his right lower extremity and spreading onto his left lower extremity despite 2 different rounds of outpatient antibiotics initially with Keflex followed by clindamycin.  He was admitted for IV antibiotics and showed dramatic improvement with vancomycin and cefepime.  MRSA swab was negative so vancomycin was discontinued and he continued to dramatically improve with cefepime.  He will be discharged home to complete 8 more days of cefpodoxime 400 mg twice daily.  Will follow-up closely in the office next week to ensure he continues to improve.    The patient was admitted for the above noted medical/surgical issues. Please see daily progress note for further details concerning their stay. The patient improved throughout their stay and reached maximum medical improvement on the day of discharge. The patient/family agree with the treatment plan as outlined above. All questions concerning their stay were answered prior to discharge. They understand the importance of follow up concerning any abnormal test results.     Physical Exam  Constitutional:       Appearance: He is well-developed.   HENT:      Head: Normocephalic and atraumatic.   Eyes:      Conjunctiva/sclera: Conjunctivae normal.      Pupils: Pupils are equal, round, and reactive to light.   Cardiovascular:      Rate and Rhythm: Normal rate and regular rhythm.      Heart sounds: Normal heart sounds. No murmur heard.     No friction rub.   Pulmonary:      Effort: Pulmonary effort is normal. No respiratory distress.      Breath sounds: Normal  breath sounds. No wheezing or rales.   Abdominal:      General: Bowel sounds are normal. There is no distension.      Palpations: Abdomen is soft.      Tenderness: There is no abdominal tenderness.   Musculoskeletal:         General: Normal range of motion.      Cervical back: Normal range of motion.   Skin:     Capillary Refill: Capillary refill takes less than 2 seconds.      Comments: Tremendously improving right greater than left lower extremity cellulitis   Neurological:      Mental Status: He is alert and oriented to person, place, and time.      Cranial Nerves: No cranial nerve deficit.   Psychiatric:         Behavior: Behavior normal.           Discharge Medications:         Discharge Medications        New Medications        Instructions Start Date   cefpodoxime 200 MG tablet  Commonly known as: VANTIN   400 mg, Oral, Every 12 Hours             Continue These Medications        Instructions Start Date   albuterol sulfate  (90 Base) MCG/ACT inhaler  Commonly known as: PROVENTIL HFA;VENTOLIN HFA;PROAIR HFA   2 puffs, Inhalation, Every 4 Hours PRN      DULoxetine 60 MG capsule  Commonly known as: CYMBALTA   60 mg, Oral, Daily      Incruse Ellipta 62.5 MCG/ACT aerosol powder   Generic drug: Umeclidinium Bromide   1 puff, Inhalation, Daily      losartan 50 MG tablet  Commonly known as: COZAAR   1 tablet, Oral, Daily      pantoprazole 40 MG EC tablet  Commonly known as: PROTONIX   40 mg, Oral, Daily      triamterene-hydrochlorothiazide 37.5-25 MG per tablet  Commonly known as: MAXZIDE-25   1 tablet, Oral, Every Morning               Activity: Ad gabino.    Diet: Ad gabino.    Consults: None    Significant Diagnostic Studies:    US Venous Doppler Lower Extremity Right (duplex)  Result Date: 6/18/2025  Impression: There is no evidence of deep venous thrombosis or superficial thrombophlebitis of the right lower extremity.  Comments: Right lower extremity venous duplex exam was performed using color Doppler flow,  Doppler wave form analysis, and grayscale imaging, with and without compression. There is no evidence of deep venous thrombosis of the common femoral, superficial femoral, popliteal, posterior tibial, and peroneal veins. There is no thrombus identified in the saphenofemoral junction or the greater saphenous vein.   This report was signed and finalized on 6/18/2025 5:44 PM by Arcenio Gomez.             Treatments:   IV antibiotics    Disposition:   Home in stable condition    18 minutes time spent on discharge including discussion with patient/family, SW, and coordination of care.     Follow up with Evert Tong MD in 1 weeks.    Signed:  Evert Tong MD   6/20/2025, 07:34 CDT

## 2025-06-21 NOTE — OUTREACH NOTE
Prep Survey      Flowsheet Row Responses   [facility] patient discharged from? [place]   Is LACE score < 7 ? No   Eligibility Readm Mgmt   Discharge diagnosis Cellulitis   Does the patient have one of the following disease processes/diagnoses(primary or secondary)? Other   Does the patient have Home health ordered? No   Is there a DME ordered? No   Prep survey completed? Yes            AL [facility] - Registered Nurse

## 2025-06-23 LAB
BACTERIA SPEC AEROBE CULT: NORMAL
BACTERIA SPEC AEROBE CULT: NORMAL

## 2025-06-25 ENCOUNTER — READMISSION MANAGEMENT (OUTPATIENT)
Dept: CALL CENTER | Facility: HOSPITAL | Age: 75
End: 2025-06-25
Payer: MEDICARE

## 2025-06-25 NOTE — OUTREACH NOTE
Medical Week 1 Survey      Flowsheet Row Responses   [facility] patient discharged from? [place]   Does the patient have one of the following disease processes/diagnoses(primary or secondary)? Other   Week 1 attempt successful? Yes   Call start time 1548   Call end time 1549   Discharge diagnosis Cellulitis   Person spoke with today (if not patient) and relationship patient   Meds reviewed with patient/caregiver? Yes   Does the patient have all medications ordered at discharge? Yes   Prescription comments no concerns or questions   Is the patient taking all medications as directed (includes completed medication regime)? Yes   Does the patient have a primary care provider?  Yes   Comments regarding PCP Seen pcp yesterday   Has home health visited the patient within 72 hours of discharge? N/A   Psychosocial issues? No   Did the patient receive a copy of their discharge instructions? Yes   Nursing interventions Reviewed instructions with patient   What is the patient's perception of their health status since discharge? Improving   Is the patient/caregiver able to teach back signs and symptoms related to disease process for when to call PCP? Yes   Is the patient/caregiver able to teach back signs and symptoms related to disease process for when to call 911? Yes   Is the patient/caregiver able to teach back the hierarchy of who to call/visit for symptoms/problems? PCP, Specialist, Home health nurse, Urgent Care, ED, 911 Yes   Week 1 call completed? Yes   Graduated Yes   Wrap up additional comments patient reports doing well. No concerns or questions noted.   Call end time 1549            Yee QUESADA - Registered Nurse

## 2025-08-19 ENCOUNTER — OFFICE VISIT (OUTPATIENT)
Dept: PULMONOLOGY | Facility: CLINIC | Age: 75
End: 2025-08-19
Payer: OTHER MISCELLANEOUS

## 2025-08-19 VITALS
BODY MASS INDEX: 29.03 KG/M2 | OXYGEN SATURATION: 97 % | SYSTOLIC BLOOD PRESSURE: 122 MMHG | HEIGHT: 67 IN | DIASTOLIC BLOOD PRESSURE: 70 MMHG | HEART RATE: 66 BPM | WEIGHT: 185 LBS

## 2025-08-19 DIAGNOSIS — Z14.8 CARRIER OF ALPHA-1-ANTITRYPSIN DEFICIENCY: ICD-10-CM

## 2025-08-19 DIAGNOSIS — J43.2 CENTRILOBULAR EMPHYSEMA: Primary | ICD-10-CM

## 2025-08-19 PROCEDURE — 99214 OFFICE O/P EST MOD 30 MIN: CPT | Performed by: INTERNAL MEDICINE

## (undated) DEVICE — THE CHANNEL CLEANING BRUSH IS A NYLON FLEXI BRUSH ATTACHED TO A FLEXIBLE PLASTIC SHEATH DESIGNED TO SAFELY REMOVE DEBRIS FROM FLEXIBLE ENDOSCOPES.

## (undated) DEVICE — MSK O2 MD CONCENTR A/ LF 7FT 1P/U

## (undated) DEVICE — ENDOGATOR AUXILIARY WATER JET CONNECTOR: Brand: ENDOGATOR

## (undated) DEVICE — Device: Brand: DEFENDO AIR/WATER/SUCTION AND BIOPSY VALVE

## (undated) DEVICE — TBG SMPL FLTR LINE NASL 02/C02 A/ BX/100

## (undated) DEVICE — SENSR O2 OXIMAX FNGR A/ 18IN NONSTR